# Patient Record
Sex: MALE | Race: WHITE | NOT HISPANIC OR LATINO | Employment: UNEMPLOYED | URBAN - NONMETROPOLITAN AREA
[De-identification: names, ages, dates, MRNs, and addresses within clinical notes are randomized per-mention and may not be internally consistent; named-entity substitution may affect disease eponyms.]

---

## 2018-06-19 DIAGNOSIS — M25.561 PAIN IN BOTH KNEES, UNSPECIFIED CHRONICITY: Primary | ICD-10-CM

## 2018-06-19 DIAGNOSIS — M25.562 PAIN IN BOTH KNEES, UNSPECIFIED CHRONICITY: Primary | ICD-10-CM

## 2018-06-20 ENCOUNTER — OFFICE VISIT (OUTPATIENT)
Dept: ORTHOPEDIC SURGERY | Facility: CLINIC | Age: 53
End: 2018-06-20

## 2018-06-20 ENCOUNTER — HOSPITAL ENCOUNTER (OUTPATIENT)
Dept: GENERAL RADIOLOGY | Facility: HOSPITAL | Age: 53
Discharge: HOME | End: 2018-06-20
Attending: ORTHOPAEDIC SURGERY | Admitting: ORTHOPAEDIC SURGERY

## 2018-06-20 VITALS
HEIGHT: 69 IN | SYSTOLIC BLOOD PRESSURE: 140 MMHG | WEIGHT: 211 LBS | BODY MASS INDEX: 31.25 KG/M2 | HEART RATE: 88 BPM | DIASTOLIC BLOOD PRESSURE: 78 MMHG

## 2018-06-20 DIAGNOSIS — M25.561 PAIN IN BOTH KNEES, UNSPECIFIED CHRONICITY: ICD-10-CM

## 2018-06-20 DIAGNOSIS — M17.0 PRIMARY OSTEOARTHRITIS OF BOTH KNEES: Primary | ICD-10-CM

## 2018-06-20 DIAGNOSIS — M25.562 PAIN IN BOTH KNEES, UNSPECIFIED CHRONICITY: ICD-10-CM

## 2018-06-20 PROBLEM — I10 HYPERTENSION: Status: ACTIVE | Noted: 2018-06-20

## 2018-06-20 PROBLEM — E78.00 HIGH CHOLESTEROL: Status: ACTIVE | Noted: 2018-06-20

## 2018-06-20 PROCEDURE — 73564 X-RAY EXAM KNEE 4 OR MORE: CPT

## 2018-06-20 PROCEDURE — 99203 OFFICE O/P NEW LOW 30 MIN: CPT | Performed by: ORTHOPAEDIC SURGERY

## 2018-06-20 PROCEDURE — 73564 X-RAY EXAM KNEE 4 OR MORE: CPT | Performed by: RADIOLOGY

## 2018-06-20 RX ORDER — ASPIRIN 81 MG/1
81 TABLET, CHEWABLE ORAL DAILY
COMMUNITY
Start: 2018-05-23

## 2018-06-20 RX ORDER — ATENOLOL 50 MG/1
50 TABLET ORAL DAILY
COMMUNITY
Start: 2018-06-01

## 2018-06-20 RX ORDER — CELECOXIB 200 MG/1
200 CAPSULE ORAL DAILY
Qty: 90 CAPSULE | Refills: 3 | Status: SHIPPED | OUTPATIENT
Start: 2018-06-20 | End: 2020-08-26

## 2018-06-20 RX ORDER — LOSARTAN POTASSIUM 50 MG/1
50 TABLET ORAL DAILY
COMMUNITY
Start: 2018-06-01

## 2018-06-20 RX ORDER — CETIRIZINE HYDROCHLORIDE 10 MG/1
10 TABLET ORAL DAILY
COMMUNITY
Start: 2018-06-01 | End: 2022-01-01 | Stop reason: SDUPTHER

## 2018-06-20 RX ORDER — LOVASTATIN 40 MG/1
40 TABLET ORAL EVERY EVENING
COMMUNITY
Start: 2018-05-23

## 2018-06-20 NOTE — PROGRESS NOTES
New Patient Visit      Patient: Kennedy Reardon  YOB: 1965  Date of Encounter: 06/20/2018      HPI:   Kennedy Reardon, 53 y.o. male, referred by Feliberto Moore MD for bilateral knee pain, right much worse that the left.  He has had no trauma.  He reports symptoms for about 5 years.  He experiences intermittent pain and swelling but reports progression of his symptoms over the past few months.  He has had no recent or remote injury.  He does do manual labor.  Symptoms are much worse with ascending and descending stairs.  He has not experienced giving way or locking.  Thus far he has not been treated with NSAIDs nor has he been provided intra-articular injections.     Active Problem List:  Patient Active Problem List   Diagnosis   • High cholesterol   • Hypertension       Past Medical History:  Past Medical History:   Diagnosis Date   • Medical history reviewed with no changes        Past Surgical History:  History reviewed. No pertinent surgical history.    Family History:  Family History   Problem Relation Age of Onset   • Osteoarthritis Mother    • Rheum arthritis Mother    • Hypertension Mother    • Cancer Father    • Osteoporosis Sister    • Osteoarthritis Maternal Grandfather    • Rheum arthritis Maternal Grandfather    • Heart disease Paternal Grandmother    • Cancer Paternal Grandfather    • Heart disease Paternal Grandfather    • Diabetes Paternal Grandfather        Social History:  Social History     Social History   • Marital status:      Spouse name: N/A   • Number of children: N/A   • Years of education: N/A     Occupational History   • Not on file.     Social History Main Topics   • Smoking status: Current Every Day Smoker   • Smokeless tobacco: Never Used   • Alcohol use No   • Drug use: Unknown   • Sexual activity: Not on file     Other Topics Concern   • Not on file     Social History Narrative   • No narrative on file     Patient's Body mass index is 31.16 kg/m². BMI is above  "normal parameters. Recommendations include: educational material.      Medications:  Current Outpatient Prescriptions   Medication Sig Dispense Refill   • aspirin 81 MG chewable tablet      • atenolol (TENORMIN) 50 MG tablet      • celecoxib (CeleBREX) 200 MG capsule Take 1 capsule by mouth Daily. 90 capsule 3   • cetirizine (zyrTEC) 10 MG tablet      • losartan (COZAAR) 50 MG tablet      • lovastatin (MEVACOR) 20 MG tablet        No current facility-administered medications for this visit.        Allergies:  No Known Allergies    Review of Systems:   Review of Systems   Constitutional: Negative.    HENT: Negative.    Eyes: Positive for discharge, redness and itching.   Respiratory: Negative.    Cardiovascular: Positive for leg swelling.   Gastrointestinal: Negative.    Endocrine: Negative.    Genitourinary: Negative.    Musculoskeletal: Positive for arthralgias, joint swelling and myalgias.   Skin: Negative.    Allergic/Immunologic: Negative.    Neurological: Negative.    Hematological: Negative.    Psychiatric/Behavioral: Negative.        Physical Exam:   Physical Exam  GENERAL: 53 y.o. male, alert and oriented X 3 in no acute distress.   Visit Vitals  /78   Pulse 88   Ht 175.3 cm (69\")   Wt 95.7 kg (211 lb)   BMI 31.16 kg/m²     Musculoskeletal: Bilateral knee examinationReveals moderate tenderness medial joint line right knee with minimal effusion no gross instability no gross deformity full extension flexion to 130° Lockman and drawer are negative neurovascular examination grossly intact.  Left knee examination shows mild medial joint line tenderness no significant effusion no instability with varus valgus stressing Lachman or drawer neurovascular grossly intact    Radiology/Labs:     Bilateral knee x-rays today show mild osteoarthritis bilateral knees with the proximally 30% loss of joint space medial compartment minimal on the left.    Assessment & Plan:   53 y.o. male with early degenerative arthritis " bilateral knees right significantly worse than the left as there is evidence of joint space narrowing on the right medial compartment.  Today we discussed his options.  I do think injections are a reasonable option in the future but initially he is placed on Celebrex 200 mg daily.  He is encouraged to return in about 6 weeks' time we will reevaluate and depending on his symptoms.  He may eventually be a candidate for viscous supplementation but would prefer initially to treat with NSAIDs.       ICD-10-CM ICD-9-CM   1. Primary osteoarthritis of both knees M17.0 715.16   2. Pain in both knees, unspecified chronicity M25.561 719.46    M25.562          Cc:   Feliberto Moore MD          Scribed for Pete Celeste MD by Laura Celeste RN.2:49 PM 06/20/2018

## 2020-08-26 ENCOUNTER — APPOINTMENT (OUTPATIENT)
Dept: CT IMAGING | Facility: HOSPITAL | Age: 55
End: 2020-08-26

## 2020-08-26 ENCOUNTER — PREP FOR SURGERY (OUTPATIENT)
Dept: OTHER | Facility: HOSPITAL | Age: 55
End: 2020-08-26

## 2020-08-26 ENCOUNTER — APPOINTMENT (OUTPATIENT)
Dept: GENERAL RADIOLOGY | Facility: HOSPITAL | Age: 55
End: 2020-08-26

## 2020-08-26 ENCOUNTER — APPOINTMENT (OUTPATIENT)
Dept: MRI IMAGING | Facility: HOSPITAL | Age: 55
End: 2020-08-26

## 2020-08-26 ENCOUNTER — HOSPITAL ENCOUNTER (INPATIENT)
Facility: HOSPITAL | Age: 55
LOS: 5 days | Discharge: HOME | End: 2020-08-31
Attending: EMERGENCY MEDICINE | Admitting: NEUROLOGICAL SURGERY

## 2020-08-26 DIAGNOSIS — R56.9 NEW ONSET SEIZURE (HCC): ICD-10-CM

## 2020-08-26 DIAGNOSIS — D72.829 LEUKOCYTOSIS, UNSPECIFIED TYPE: ICD-10-CM

## 2020-08-26 DIAGNOSIS — G93.89 BRAIN MASS: Primary | ICD-10-CM

## 2020-08-26 DIAGNOSIS — C71.9 GLIOMA (HCC): ICD-10-CM

## 2020-08-26 DIAGNOSIS — R13.10 DYSPHAGIA, UNSPECIFIED TYPE: ICD-10-CM

## 2020-08-26 LAB
ALBUMIN SERPL-MCNC: 4.4 G/DL (ref 3.5–5.2)
ALBUMIN/GLOB SERPL: 1.5 G/DL
ALP SERPL-CCNC: 82 U/L (ref 39–117)
ALT SERPL W P-5'-P-CCNC: 26 U/L (ref 1–41)
ANION GAP SERPL CALCULATED.3IONS-SCNC: 8 MMOL/L (ref 5–15)
AST SERPL-CCNC: 29 U/L (ref 1–40)
BASOPHILS # BLD AUTO: 0.04 10*3/MM3 (ref 0–0.2)
BASOPHILS NFR BLD AUTO: 0.3 % (ref 0–1.5)
BILIRUB SERPL-MCNC: 0.4 MG/DL (ref 0–1.2)
BUN SERPL-MCNC: 7 MG/DL (ref 6–20)
BUN/CREAT SERPL: 8 (ref 7–25)
CALCIUM SPEC-SCNC: 9.1 MG/DL (ref 8.6–10.5)
CHLORIDE SERPL-SCNC: 101 MMOL/L (ref 98–107)
CO2 SERPL-SCNC: 27 MMOL/L (ref 22–29)
CREAT SERPL-MCNC: 0.88 MG/DL (ref 0.76–1.27)
DEPRECATED RDW RBC AUTO: 42.2 FL (ref 37–54)
EOSINOPHIL # BLD AUTO: 0.03 10*3/MM3 (ref 0–0.4)
EOSINOPHIL NFR BLD AUTO: 0.2 % (ref 0.3–6.2)
ERYTHROCYTE [DISTWIDTH] IN BLOOD BY AUTOMATED COUNT: 12.1 % (ref 12.3–15.4)
GFR SERPL CREATININE-BSD FRML MDRD: 90 ML/MIN/1.73
GLOBULIN UR ELPH-MCNC: 2.9 GM/DL
GLUCOSE SERPL-MCNC: 117 MG/DL (ref 65–99)
HCT VFR BLD AUTO: 41.7 % (ref 37.5–51)
HGB BLD-MCNC: 14.3 G/DL (ref 13–17.7)
HOLD SPECIMEN: NORMAL
HOLD SPECIMEN: NORMAL
IMM GRANULOCYTES # BLD AUTO: 0.06 10*3/MM3 (ref 0–0.05)
IMM GRANULOCYTES NFR BLD AUTO: 0.5 % (ref 0–0.5)
LIPASE SERPL-CCNC: 25 U/L (ref 13–60)
LYMPHOCYTES # BLD AUTO: 1.27 10*3/MM3 (ref 0.7–3.1)
LYMPHOCYTES NFR BLD AUTO: 9.6 % (ref 19.6–45.3)
MCH RBC QN AUTO: 32 PG (ref 26.6–33)
MCHC RBC AUTO-ENTMCNC: 34.3 G/DL (ref 31.5–35.7)
MCV RBC AUTO: 93.3 FL (ref 79–97)
MONOCYTES # BLD AUTO: 0.8 10*3/MM3 (ref 0.1–0.9)
MONOCYTES NFR BLD AUTO: 6 % (ref 5–12)
NEUTROPHILS NFR BLD AUTO: 11.09 10*3/MM3 (ref 1.7–7)
NEUTROPHILS NFR BLD AUTO: 83.4 % (ref 42.7–76)
NRBC BLD AUTO-RTO: 0 /100 WBC (ref 0–0.2)
NT-PROBNP SERPL-MCNC: 358.6 PG/ML (ref 0–900)
PLATELET # BLD AUTO: 227 10*3/MM3 (ref 140–450)
PMV BLD AUTO: 10 FL (ref 6–12)
POTASSIUM SERPL-SCNC: 4.2 MMOL/L (ref 3.5–5.2)
PROT SERPL-MCNC: 7.3 G/DL (ref 6–8.5)
RBC # BLD AUTO: 4.47 10*6/MM3 (ref 4.14–5.8)
SODIUM SERPL-SCNC: 136 MMOL/L (ref 136–145)
TROPONIN T SERPL-MCNC: <0.01 NG/ML (ref 0–0.03)
WBC # BLD AUTO: 13.29 10*3/MM3 (ref 3.4–10.8)
WHOLE BLOOD HOLD SPECIMEN: NORMAL
WHOLE BLOOD HOLD SPECIMEN: NORMAL

## 2020-08-26 PROCEDURE — 84484 ASSAY OF TROPONIN QUANT: CPT | Performed by: EMERGENCY MEDICINE

## 2020-08-26 PROCEDURE — A9577 INJ MULTIHANCE: HCPCS | Performed by: EMERGENCY MEDICINE

## 2020-08-26 PROCEDURE — 71045 X-RAY EXAM CHEST 1 VIEW: CPT

## 2020-08-26 PROCEDURE — 85025 COMPLETE CBC W/AUTO DIFF WBC: CPT | Performed by: EMERGENCY MEDICINE

## 2020-08-26 PROCEDURE — 25010000002 DEXAMETHASONE PER 1 MG: Performed by: EMERGENCY MEDICINE

## 2020-08-26 PROCEDURE — 25010000002 DEXAMETHASONE PER 1 MG: Performed by: NEUROLOGICAL SURGERY

## 2020-08-26 PROCEDURE — 71250 CT THORAX DX C-: CPT

## 2020-08-26 PROCEDURE — 93005 ELECTROCARDIOGRAM TRACING: CPT | Performed by: EMERGENCY MEDICINE

## 2020-08-26 PROCEDURE — 25010000003 LEVETIRACETAM IN NACL 0.54% 1500 MG/100ML SOLUTION: Performed by: EMERGENCY MEDICINE

## 2020-08-26 PROCEDURE — 70450 CT HEAD/BRAIN W/O DYE: CPT

## 2020-08-26 PROCEDURE — 80053 COMPREHEN METABOLIC PANEL: CPT | Performed by: EMERGENCY MEDICINE

## 2020-08-26 PROCEDURE — 70553 MRI BRAIN STEM W/O & W/DYE: CPT

## 2020-08-26 PROCEDURE — 83690 ASSAY OF LIPASE: CPT | Performed by: EMERGENCY MEDICINE

## 2020-08-26 PROCEDURE — 99285 EMERGENCY DEPT VISIT HI MDM: CPT

## 2020-08-26 PROCEDURE — 83880 ASSAY OF NATRIURETIC PEPTIDE: CPT | Performed by: EMERGENCY MEDICINE

## 2020-08-26 PROCEDURE — 0 GADOBENATE DIMEGLUMINE 529 MG/ML SOLUTION: Performed by: EMERGENCY MEDICINE

## 2020-08-26 PROCEDURE — 99223 1ST HOSP IP/OBS HIGH 75: CPT | Performed by: HOSPITALIST

## 2020-08-26 RX ORDER — LEVETIRACETAM 15 MG/ML
1500 INJECTION INTRAVASCULAR ONCE
Status: COMPLETED | OUTPATIENT
Start: 2020-08-26 | End: 2020-08-26

## 2020-08-26 RX ORDER — FOLIC ACID 1 MG/1
1 TABLET ORAL DAILY
Status: DISCONTINUED | OUTPATIENT
Start: 2020-08-27 | End: 2020-08-28

## 2020-08-26 RX ORDER — ASPIRIN 81 MG/1
324 TABLET, CHEWABLE ORAL ONCE
Status: COMPLETED | OUTPATIENT
Start: 2020-08-26 | End: 2020-08-26

## 2020-08-26 RX ORDER — SODIUM CHLORIDE 0.9 % (FLUSH) 0.9 %
10 SYRINGE (ML) INJECTION AS NEEDED
Status: DISCONTINUED | OUTPATIENT
Start: 2020-08-26 | End: 2020-08-28

## 2020-08-26 RX ORDER — LORAZEPAM 1 MG/1
1 TABLET ORAL
Status: DISCONTINUED | OUTPATIENT
Start: 2020-08-26 | End: 2020-08-28

## 2020-08-26 RX ORDER — SODIUM CHLORIDE, SODIUM LACTATE, POTASSIUM CHLORIDE, CALCIUM CHLORIDE 600; 310; 30; 20 MG/100ML; MG/100ML; MG/100ML; MG/100ML
100 INJECTION, SOLUTION INTRAVENOUS CONTINUOUS
Status: CANCELLED | OUTPATIENT
Start: 2020-08-26

## 2020-08-26 RX ORDER — DEXAMETHASONE SODIUM PHOSPHATE 4 MG/ML
4 INJECTION, SOLUTION INTRA-ARTICULAR; INTRALESIONAL; INTRAMUSCULAR; INTRAVENOUS; SOFT TISSUE EVERY 8 HOURS
Status: DISCONTINUED | OUTPATIENT
Start: 2020-08-26 | End: 2020-08-26

## 2020-08-26 RX ORDER — LORAZEPAM 2 MG/ML
2 INJECTION INTRAMUSCULAR
Status: DISCONTINUED | OUTPATIENT
Start: 2020-08-26 | End: 2020-08-28

## 2020-08-26 RX ORDER — THIAMINE MONONITRATE (VIT B1) 100 MG
100 TABLET ORAL DAILY
Status: DISCONTINUED | OUTPATIENT
Start: 2020-08-27 | End: 2020-08-28

## 2020-08-26 RX ORDER — LEVETIRACETAM 500 MG/1
500 TABLET ORAL EVERY 12 HOURS SCHEDULED
Status: DISCONTINUED | OUTPATIENT
Start: 2020-08-26 | End: 2020-08-31 | Stop reason: HOSPADM

## 2020-08-26 RX ORDER — CEFAZOLIN SODIUM 2 G/100ML
2 INJECTION, SOLUTION INTRAVENOUS ONCE
Status: CANCELLED | OUTPATIENT
Start: 2020-08-26 | End: 2020-08-26

## 2020-08-26 RX ORDER — SODIUM CHLORIDE 0.9 % (FLUSH) 0.9 %
3 SYRINGE (ML) INJECTION EVERY 12 HOURS SCHEDULED
Status: CANCELLED | OUTPATIENT
Start: 2020-08-26

## 2020-08-26 RX ORDER — DIPHENOXYLATE HYDROCHLORIDE AND ATROPINE SULFATE 2.5; .025 MG/1; MG/1
1 TABLET ORAL DAILY
Status: DISCONTINUED | OUTPATIENT
Start: 2020-08-27 | End: 2020-08-28

## 2020-08-26 RX ORDER — LORAZEPAM 1 MG/1
2 TABLET ORAL
Status: DISCONTINUED | OUTPATIENT
Start: 2020-08-26 | End: 2020-08-28

## 2020-08-26 RX ORDER — DEXAMETHASONE IN 0.9 % SOD CHL 10 MG/50ML
10 INTRAVENOUS SOLUTION, PIGGYBACK (ML) INTRAVENOUS
Status: CANCELLED | OUTPATIENT
Start: 2020-08-26

## 2020-08-26 RX ORDER — LORAZEPAM 2 MG/ML
1 INJECTION INTRAMUSCULAR
Status: DISCONTINUED | OUTPATIENT
Start: 2020-08-26 | End: 2020-08-28

## 2020-08-26 RX ORDER — DEXAMETHASONE SODIUM PHOSPHATE 4 MG/ML
4 INJECTION, SOLUTION INTRA-ARTICULAR; INTRALESIONAL; INTRAMUSCULAR; INTRAVENOUS; SOFT TISSUE 4 TIMES DAILY
Status: DISCONTINUED | OUTPATIENT
Start: 2020-08-26 | End: 2020-08-27

## 2020-08-26 RX ORDER — DEXAMETHASONE SODIUM PHOSPHATE 4 MG/ML
8 INJECTION, SOLUTION INTRA-ARTICULAR; INTRALESIONAL; INTRAMUSCULAR; INTRAVENOUS; SOFT TISSUE ONCE
Status: COMPLETED | OUTPATIENT
Start: 2020-08-26 | End: 2020-08-26

## 2020-08-26 RX ORDER — SODIUM CHLORIDE 0.9 % (FLUSH) 0.9 %
10 SYRINGE (ML) INJECTION AS NEEDED
Status: CANCELLED | OUTPATIENT
Start: 2020-08-26

## 2020-08-26 RX ADMIN — LEVETIRACETAM 500 MG: 500 TABLET ORAL at 21:40

## 2020-08-26 RX ADMIN — ASPIRIN 324 MG: 81 TABLET, CHEWABLE ORAL at 13:16

## 2020-08-26 RX ADMIN — DEXAMETHASONE SODIUM PHOSPHATE 8 MG: 4 INJECTION, SOLUTION INTRA-ARTICULAR; INTRALESIONAL; INTRAMUSCULAR; INTRAVENOUS; SOFT TISSUE at 13:13

## 2020-08-26 RX ADMIN — SODIUM CHLORIDE, PRESERVATIVE FREE 10 ML: 5 INJECTION INTRAVENOUS at 21:40

## 2020-08-26 RX ADMIN — DEXAMETHASONE SODIUM PHOSPHATE 4 MG: 4 INJECTION, SOLUTION INTRA-ARTICULAR; INTRALESIONAL; INTRAMUSCULAR; INTRAVENOUS; SOFT TISSUE at 21:40

## 2020-08-26 RX ADMIN — GADOBENATE DIMEGLUMINE 20 ML: 529 INJECTION, SOLUTION INTRAVENOUS at 15:33

## 2020-08-26 RX ADMIN — LEVETIRACETAM INJECTION 1500 MG: 15 INJECTION INTRAVENOUS at 14:12

## 2020-08-27 ENCOUNTER — APPOINTMENT (OUTPATIENT)
Dept: MRI IMAGING | Facility: HOSPITAL | Age: 55
End: 2020-08-27

## 2020-08-27 ENCOUNTER — ANESTHESIA EVENT (OUTPATIENT)
Dept: PERIOP | Facility: HOSPITAL | Age: 55
End: 2020-08-27

## 2020-08-27 LAB
ANION GAP SERPL CALCULATED.3IONS-SCNC: 8 MMOL/L (ref 5–15)
B PARAPERT DNA SPEC QL NAA+PROBE: NOT DETECTED
B PERT DNA SPEC QL NAA+PROBE: NOT DETECTED
BASOPHILS # BLD AUTO: 0.01 10*3/MM3 (ref 0–0.2)
BASOPHILS NFR BLD AUTO: 0.1 % (ref 0–1.5)
BUN SERPL-MCNC: 8 MG/DL (ref 6–20)
BUN/CREAT SERPL: 10.3 (ref 7–25)
C PNEUM DNA NPH QL NAA+NON-PROBE: NOT DETECTED
CALCIUM SPEC-SCNC: 9 MG/DL (ref 8.6–10.5)
CHLORIDE SERPL-SCNC: 103 MMOL/L (ref 98–107)
CO2 SERPL-SCNC: 27 MMOL/L (ref 22–29)
CREAT SERPL-MCNC: 0.78 MG/DL (ref 0.76–1.27)
DEPRECATED RDW RBC AUTO: 43.6 FL (ref 37–54)
EOSINOPHIL # BLD AUTO: 0 10*3/MM3 (ref 0–0.4)
EOSINOPHIL NFR BLD AUTO: 0 % (ref 0.3–6.2)
ERYTHROCYTE [DISTWIDTH] IN BLOOD BY AUTOMATED COUNT: 12.4 % (ref 12.3–15.4)
FLUAV H1 2009 PAND RNA NPH QL NAA+PROBE: NOT DETECTED
FLUAV H1 HA GENE NPH QL NAA+PROBE: NOT DETECTED
FLUAV H3 RNA NPH QL NAA+PROBE: NOT DETECTED
FLUAV SUBTYP SPEC NAA+PROBE: NOT DETECTED
FLUBV RNA ISLT QL NAA+PROBE: NOT DETECTED
GFR SERPL CREATININE-BSD FRML MDRD: 104 ML/MIN/1.73
GLUCOSE SERPL-MCNC: 132 MG/DL (ref 65–99)
HADV DNA SPEC NAA+PROBE: NOT DETECTED
HCOV 229E RNA SPEC QL NAA+PROBE: NOT DETECTED
HCOV HKU1 RNA SPEC QL NAA+PROBE: NOT DETECTED
HCOV NL63 RNA SPEC QL NAA+PROBE: NOT DETECTED
HCOV OC43 RNA SPEC QL NAA+PROBE: NOT DETECTED
HCT VFR BLD AUTO: 40.9 % (ref 37.5–51)
HGB BLD-MCNC: 13.6 G/DL (ref 13–17.7)
HMPV RNA NPH QL NAA+NON-PROBE: NOT DETECTED
HPIV1 RNA SPEC QL NAA+PROBE: NOT DETECTED
HPIV2 RNA SPEC QL NAA+PROBE: NOT DETECTED
HPIV3 RNA NPH QL NAA+PROBE: NOT DETECTED
HPIV4 P GENE NPH QL NAA+PROBE: NOT DETECTED
IMM GRANULOCYTES # BLD AUTO: 0.07 10*3/MM3 (ref 0–0.05)
IMM GRANULOCYTES NFR BLD AUTO: 0.6 % (ref 0–0.5)
LYMPHOCYTES # BLD AUTO: 0.96 10*3/MM3 (ref 0.7–3.1)
LYMPHOCYTES NFR BLD AUTO: 7.6 % (ref 19.6–45.3)
M PNEUMO IGG SER IA-ACNC: NOT DETECTED
MCH RBC QN AUTO: 31.6 PG (ref 26.6–33)
MCHC RBC AUTO-ENTMCNC: 33.3 G/DL (ref 31.5–35.7)
MCV RBC AUTO: 94.9 FL (ref 79–97)
MONOCYTES # BLD AUTO: 0.48 10*3/MM3 (ref 0.1–0.9)
MONOCYTES NFR BLD AUTO: 3.8 % (ref 5–12)
NEUTROPHILS NFR BLD AUTO: 11.05 10*3/MM3 (ref 1.7–7)
NEUTROPHILS NFR BLD AUTO: 87.9 % (ref 42.7–76)
NRBC BLD AUTO-RTO: 0 /100 WBC (ref 0–0.2)
PLATELET # BLD AUTO: 199 10*3/MM3 (ref 140–450)
PMV BLD AUTO: 10.4 FL (ref 6–12)
POTASSIUM SERPL-SCNC: 4.6 MMOL/L (ref 3.5–5.2)
RBC # BLD AUTO: 4.31 10*6/MM3 (ref 4.14–5.8)
RHINOVIRUS RNA SPEC NAA+PROBE: NOT DETECTED
RSV RNA NPH QL NAA+NON-PROBE: NOT DETECTED
SARS-COV-2 RNA NPH QL NAA+NON-PROBE: NOT DETECTED
SODIUM SERPL-SCNC: 138 MMOL/L (ref 136–145)
WBC # BLD AUTO: 12.57 10*3/MM3 (ref 3.4–10.8)

## 2020-08-27 PROCEDURE — 80048 BASIC METABOLIC PNL TOTAL CA: CPT | Performed by: HOSPITALIST

## 2020-08-27 PROCEDURE — 92610 EVALUATE SWALLOWING FUNCTION: CPT

## 2020-08-27 PROCEDURE — 85025 COMPLETE CBC W/AUTO DIFF WBC: CPT | Performed by: HOSPITALIST

## 2020-08-27 PROCEDURE — 0202U NFCT DS 22 TRGT SARS-COV-2: CPT | Performed by: INTERNAL MEDICINE

## 2020-08-27 PROCEDURE — 99232 SBSQ HOSP IP/OBS MODERATE 35: CPT | Performed by: INTERNAL MEDICINE

## 2020-08-27 PROCEDURE — 25010000002 DEXAMETHASONE PER 1 MG: Performed by: NEUROLOGICAL SURGERY

## 2020-08-27 PROCEDURE — 70552 MRI BRAIN STEM W/DYE: CPT

## 2020-08-27 PROCEDURE — A9577 INJ MULTIHANCE: HCPCS | Performed by: INTERNAL MEDICINE

## 2020-08-27 PROCEDURE — 0 GADOBENATE DIMEGLUMINE 529 MG/ML SOLUTION: Performed by: INTERNAL MEDICINE

## 2020-08-27 RX ORDER — CETIRIZINE HYDROCHLORIDE 10 MG/1
10 TABLET ORAL DAILY
Status: DISCONTINUED | OUTPATIENT
Start: 2020-08-27 | End: 2020-08-31 | Stop reason: HOSPADM

## 2020-08-27 RX ORDER — SODIUM CHLORIDE 0.9 % (FLUSH) 0.9 %
10 SYRINGE (ML) INJECTION AS NEEDED
Status: DISCONTINUED | OUTPATIENT
Start: 2020-08-27 | End: 2020-08-28

## 2020-08-27 RX ORDER — SODIUM CHLORIDE 0.9 % (FLUSH) 0.9 %
10 SYRINGE (ML) INJECTION EVERY 12 HOURS SCHEDULED
Status: DISCONTINUED | OUTPATIENT
Start: 2020-08-27 | End: 2020-08-28

## 2020-08-27 RX ORDER — ATORVASTATIN CALCIUM 10 MG/1
10 TABLET, FILM COATED ORAL NIGHTLY
Status: DISCONTINUED | OUTPATIENT
Start: 2020-08-27 | End: 2020-08-31 | Stop reason: HOSPADM

## 2020-08-27 RX ORDER — SODIUM CHLORIDE, SODIUM LACTATE, POTASSIUM CHLORIDE, CALCIUM CHLORIDE 600; 310; 30; 20 MG/100ML; MG/100ML; MG/100ML; MG/100ML
100 INJECTION, SOLUTION INTRAVENOUS CONTINUOUS
Status: DISCONTINUED | OUTPATIENT
Start: 2020-08-27 | End: 2020-08-28

## 2020-08-27 RX ORDER — ATENOLOL 50 MG/1
50 TABLET ORAL
Status: DISCONTINUED | OUTPATIENT
Start: 2020-08-27 | End: 2020-08-31 | Stop reason: HOSPADM

## 2020-08-27 RX ORDER — ONDANSETRON 2 MG/ML
4 INJECTION INTRAMUSCULAR; INTRAVENOUS EVERY 6 HOURS PRN
Status: DISCONTINUED | OUTPATIENT
Start: 2020-08-27 | End: 2020-08-28

## 2020-08-27 RX ORDER — DEXAMETHASONE SODIUM PHOSPHATE 4 MG/ML
4 INJECTION, SOLUTION INTRA-ARTICULAR; INTRALESIONAL; INTRAMUSCULAR; INTRAVENOUS; SOFT TISSUE EVERY 6 HOURS SCHEDULED
Status: DISCONTINUED | OUTPATIENT
Start: 2020-08-27 | End: 2020-08-28 | Stop reason: SDUPTHER

## 2020-08-27 RX ORDER — ONDANSETRON 4 MG/1
4 TABLET, FILM COATED ORAL EVERY 6 HOURS PRN
Status: DISCONTINUED | OUTPATIENT
Start: 2020-08-27 | End: 2020-08-28

## 2020-08-27 RX ORDER — DEXAMETHASONE SODIUM PHOSPHATE 4 MG/ML
4 INJECTION, SOLUTION INTRA-ARTICULAR; INTRALESIONAL; INTRAMUSCULAR; INTRAVENOUS; SOFT TISSUE EVERY 8 HOURS
Status: DISCONTINUED | OUTPATIENT
Start: 2020-08-27 | End: 2020-08-27 | Stop reason: SDUPTHER

## 2020-08-27 RX ORDER — SODIUM CHLORIDE, SODIUM LACTATE, POTASSIUM CHLORIDE, CALCIUM CHLORIDE 600; 310; 30; 20 MG/100ML; MG/100ML; MG/100ML; MG/100ML
9 INJECTION, SOLUTION INTRAVENOUS CONTINUOUS
Status: CANCELLED | OUTPATIENT
Start: 2020-08-27

## 2020-08-27 RX ORDER — FAMOTIDINE 20 MG/1
20 TABLET, FILM COATED ORAL ONCE
Status: CANCELLED | OUTPATIENT
Start: 2020-08-27 | End: 2020-08-27

## 2020-08-27 RX ADMIN — MULTIVITAMIN TABLET 1 TABLET: TABLET at 09:20

## 2020-08-27 RX ADMIN — THIAMINE HCL TAB 100 MG 100 MG: 100 TAB at 09:20

## 2020-08-27 RX ADMIN — SODIUM CHLORIDE, POTASSIUM CHLORIDE, SODIUM LACTATE AND CALCIUM CHLORIDE 100 ML/HR: 600; 310; 30; 20 INJECTION, SOLUTION INTRAVENOUS at 00:40

## 2020-08-27 RX ADMIN — SODIUM CHLORIDE, POTASSIUM CHLORIDE, SODIUM LACTATE AND CALCIUM CHLORIDE 100 ML/HR: 600; 310; 30; 20 INJECTION, SOLUTION INTRAVENOUS at 09:29

## 2020-08-27 RX ADMIN — SODIUM CHLORIDE, POTASSIUM CHLORIDE, SODIUM LACTATE AND CALCIUM CHLORIDE 100 ML/HR: 600; 310; 30; 20 INJECTION, SOLUTION INTRAVENOUS at 21:25

## 2020-08-27 RX ADMIN — SODIUM CHLORIDE, PRESERVATIVE FREE 10 ML: 5 INJECTION INTRAVENOUS at 09:19

## 2020-08-27 RX ADMIN — DEXAMETHASONE SODIUM PHOSPHATE 4 MG: 4 INJECTION, SOLUTION INTRA-ARTICULAR; INTRALESIONAL; INTRAMUSCULAR; INTRAVENOUS; SOFT TISSUE at 17:46

## 2020-08-27 RX ADMIN — ATENOLOL 50 MG: 50 TABLET ORAL at 09:22

## 2020-08-27 RX ADMIN — SODIUM CHLORIDE, PRESERVATIVE FREE 10 ML: 5 INJECTION INTRAVENOUS at 00:43

## 2020-08-27 RX ADMIN — GADOBENATE DIMEGLUMINE 20 ML: 529 INJECTION, SOLUTION INTRAVENOUS at 15:45

## 2020-08-27 RX ADMIN — ATORVASTATIN CALCIUM 10 MG: 10 TABLET, FILM COATED ORAL at 00:39

## 2020-08-27 RX ADMIN — SODIUM CHLORIDE, POTASSIUM CHLORIDE, SODIUM LACTATE AND CALCIUM CHLORIDE 500 ML: 600; 310; 30; 20 INJECTION, SOLUTION INTRAVENOUS at 00:39

## 2020-08-27 RX ADMIN — LEVETIRACETAM 500 MG: 500 TABLET ORAL at 09:22

## 2020-08-27 RX ADMIN — ATORVASTATIN CALCIUM 10 MG: 10 TABLET, FILM COATED ORAL at 21:26

## 2020-08-27 RX ADMIN — DEXAMETHASONE SODIUM PHOSPHATE 4 MG: 4 INJECTION, SOLUTION INTRA-ARTICULAR; INTRALESIONAL; INTRAMUSCULAR; INTRAVENOUS; SOFT TISSUE at 11:18

## 2020-08-27 RX ADMIN — FOLIC ACID 1 MG: 1 TABLET ORAL at 09:20

## 2020-08-27 RX ADMIN — CETIRIZINE HYDROCHLORIDE 10 MG: 10 TABLET, FILM COATED ORAL at 11:19

## 2020-08-27 RX ADMIN — LEVETIRACETAM 500 MG: 500 TABLET ORAL at 21:26

## 2020-08-27 RX ADMIN — SODIUM CHLORIDE, PRESERVATIVE FREE 10 ML: 5 INJECTION INTRAVENOUS at 21:26

## 2020-08-27 RX ADMIN — DEXAMETHASONE SODIUM PHOSPHATE 4 MG: 4 INJECTION, SOLUTION INTRA-ARTICULAR; INTRALESIONAL; INTRAMUSCULAR; INTRAVENOUS; SOFT TISSUE at 04:22

## 2020-08-27 NOTE — ANESTHESIA PREPROCEDURE EVALUATION
Anesthesia Evaluation     Patient summary reviewed and Nursing notes reviewed   no history of anesthetic complications:  NPO Solid Status: > 8 hours  NPO Liquid Status: > 8 hours           Airway   Mallampati: II  TM distance: >3 FB  Neck ROM: full  No difficulty expected  Dental - normal exam     Pulmonary    (+) a smoker Current, wheezes,   Cardiovascular - normal exam    ECG reviewed    (+) hypertension, hyperlipidemia,   (-) dysrhythmias, angina, LOW      Neuro/Psych  (+) seizures,       ROS Comment: MRI:   large cystic mass identified within the left temporoparietal lobe  creating surrounding mass effect with minimal surrounding edema. There  is some mass effect on the posterior horn of the left lateral ventricle.  Minimal midline shift.  GI/Hepatic/Renal/Endo - negative ROS     Musculoskeletal (-) negative ROS    Abdominal    Substance History      OB/GYN          Other - negative ROS                     Anesthesia Plan    ASA 3     general   ([place]  preop Duoneb)  intravenous induction     Anesthetic plan, all risks, benefits, and alternatives have been provided, discussed and informed consent has been obtained with: patient.  Use of blood products discussed with patient  Consented to blood products.   Plan discussed with CRNA.

## 2020-08-28 ENCOUNTER — ANESTHESIA (OUTPATIENT)
Dept: PERIOP | Facility: HOSPITAL | Age: 55
End: 2020-08-28

## 2020-08-28 PROBLEM — E66.9 CLASS 1 OBESITY IN ADULT: Chronic | Status: ACTIVE | Noted: 2020-08-28

## 2020-08-28 PROBLEM — E78.5 DYSLIPIDEMIA: Chronic | Status: ACTIVE | Noted: 2018-06-20

## 2020-08-28 PROBLEM — Z78.9 ALCOHOL USE: Chronic | Status: ACTIVE | Noted: 2020-08-28

## 2020-08-28 PROBLEM — F17.200 CURRENT SMOKER: Chronic | Status: ACTIVE | Noted: 2020-08-28

## 2020-08-28 PROBLEM — E66.9 CLASS 1 OBESITY IN ADULT: Status: ACTIVE | Noted: 2020-08-28

## 2020-08-28 PROBLEM — E78.5 DYSLIPIDEMIA: Status: ACTIVE | Noted: 2018-06-20

## 2020-08-28 PROBLEM — I10 HYPERTENSION: Chronic | Status: ACTIVE | Noted: 2018-06-20

## 2020-08-28 PROBLEM — F17.200 CURRENT SMOKER: Status: ACTIVE | Noted: 2020-08-28

## 2020-08-28 PROBLEM — Z78.9 ALCOHOL USE: Status: ACTIVE | Noted: 2020-08-28

## 2020-08-28 LAB
ABO GROUP BLD: NORMAL
ABO GROUP BLD: NORMAL
ANION GAP SERPL CALCULATED.3IONS-SCNC: 9 MMOL/L (ref 5–15)
BASOPHILS # BLD MANUAL: 0 10*3/MM3 (ref 0–0.2)
BASOPHILS NFR BLD AUTO: 0 % (ref 0–1.5)
BLD GP AB SCN SERPL QL: NEGATIVE
BUN SERPL-MCNC: 12 MG/DL (ref 6–20)
BUN/CREAT SERPL: 15.2 (ref 7–25)
CALCIUM SPEC-SCNC: 9.1 MG/DL (ref 8.6–10.5)
CHLORIDE SERPL-SCNC: 104 MMOL/L (ref 98–107)
CO2 SERPL-SCNC: 27 MMOL/L (ref 22–29)
CREAT SERPL-MCNC: 0.79 MG/DL (ref 0.76–1.27)
DEPRECATED RDW RBC AUTO: 43.8 FL (ref 37–54)
EOSINOPHIL # BLD MANUAL: 0 10*3/MM3 (ref 0–0.4)
EOSINOPHIL NFR BLD MANUAL: 0 % (ref 0.3–6.2)
ERYTHROCYTE [DISTWIDTH] IN BLOOD BY AUTOMATED COUNT: 12.5 % (ref 12.3–15.4)
GFR SERPL CREATININE-BSD FRML MDRD: 102 ML/MIN/1.73
GLUCOSE SERPL-MCNC: 129 MG/DL (ref 65–99)
HCT VFR BLD AUTO: 40.7 % (ref 37.5–51)
HGB BLD-MCNC: 13.6 G/DL (ref 13–17.7)
INR PPP: 1.02 (ref 0.85–1.16)
LYMPHOCYTES # BLD MANUAL: 0.78 10*3/MM3 (ref 0.7–3.1)
LYMPHOCYTES NFR BLD MANUAL: 2 % (ref 5–12)
LYMPHOCYTES NFR BLD MANUAL: 5.1 % (ref 19.6–45.3)
MCH RBC QN AUTO: 32.1 PG (ref 26.6–33)
MCHC RBC AUTO-ENTMCNC: 33.4 G/DL (ref 31.5–35.7)
MCV RBC AUTO: 96 FL (ref 79–97)
MONOCYTES # BLD AUTO: 0.31 10*3/MM3 (ref 0.1–0.9)
NEUTROPHILS # BLD AUTO: 14.3 10*3/MM3 (ref 1.7–7)
NEUTROPHILS NFR BLD MANUAL: 91.9 % (ref 42.7–76)
NEUTS BAND NFR BLD MANUAL: 1 % (ref 0–5)
PLAT MORPH BLD: NORMAL
PLATELET # BLD AUTO: 195 10*3/MM3 (ref 140–450)
PMV BLD AUTO: 10.3 FL (ref 6–12)
POTASSIUM SERPL-SCNC: 4.6 MMOL/L (ref 3.5–5.2)
PROTHROMBIN TIME: 13.1 SECONDS (ref 11.5–14)
RBC # BLD AUTO: 4.24 10*6/MM3 (ref 4.14–5.8)
RBC MORPH BLD: NORMAL
RH BLD: POSITIVE
RH BLD: POSITIVE
SCAN SLIDE: NORMAL
SODIUM SERPL-SCNC: 140 MMOL/L (ref 136–145)
T&S EXPIRATION DATE: NORMAL
WBC # BLD AUTO: 15.39 10*3/MM3 (ref 3.4–10.8)
WBC MORPH BLD: NORMAL

## 2020-08-28 PROCEDURE — 86850 RBC ANTIBODY SCREEN: CPT | Performed by: NEUROLOGICAL SURGERY

## 2020-08-28 PROCEDURE — 88377 M/PHMTRC ALYS ISHQUANT/SEMIQ: CPT

## 2020-08-28 PROCEDURE — 25010000002 DEXAMETHASONE PER 1 MG: Performed by: NEUROLOGICAL SURGERY

## 2020-08-28 PROCEDURE — 00B70ZZ EXCISION OF CEREBRAL HEMISPHERE, OPEN APPROACH: ICD-10-PCS | Performed by: NEUROLOGICAL SURGERY

## 2020-08-28 PROCEDURE — 61781 SCAN PROC CRANIAL INTRA: CPT | Performed by: NEUROLOGICAL SURGERY

## 2020-08-28 PROCEDURE — 61510 CRNEC TREPH EXC BRN TUM STTL: CPT | Performed by: NEUROLOGICAL SURGERY

## 2020-08-28 PROCEDURE — 25010000002 FUROSEMIDE PER 20 MG: Performed by: NURSE ANESTHETIST, CERTIFIED REGISTERED

## 2020-08-28 PROCEDURE — 81287 MGMT GENE PRMTR MTHYLTN ALYS: CPT

## 2020-08-28 PROCEDURE — 94640 AIRWAY INHALATION TREATMENT: CPT

## 2020-08-28 PROCEDURE — 88331 PATH CONSLTJ SURG 1 BLK 1SPC: CPT | Performed by: PATHOLOGY

## 2020-08-28 PROCEDURE — C1713 ANCHOR/SCREW BN/BN,TIS/BN: HCPCS | Performed by: NEUROLOGICAL SURGERY

## 2020-08-28 PROCEDURE — 86901 BLOOD TYPING SEROLOGIC RH(D): CPT

## 2020-08-28 PROCEDURE — 85025 COMPLETE CBC W/AUTO DIFF WBC: CPT | Performed by: INTERNAL MEDICINE

## 2020-08-28 PROCEDURE — 25010000003 POTASSIUM CHLORIDE PER 2 MEQ: Performed by: NEUROLOGICAL SURGERY

## 2020-08-28 PROCEDURE — 99233 SBSQ HOSP IP/OBS HIGH 50: CPT | Performed by: INTERNAL MEDICINE

## 2020-08-28 PROCEDURE — 25010000002 FENTANYL CITRATE (PF) 100 MCG/2ML SOLUTION: Performed by: NURSE ANESTHETIST, CERTIFIED REGISTERED

## 2020-08-28 PROCEDURE — 86900 BLOOD TYPING SEROLOGIC ABO: CPT

## 2020-08-28 PROCEDURE — 80048 BASIC METABOLIC PNL TOTAL CA: CPT | Performed by: INTERNAL MEDICINE

## 2020-08-28 PROCEDURE — 86901 BLOOD TYPING SEROLOGIC RH(D): CPT | Performed by: NEUROLOGICAL SURGERY

## 2020-08-28 PROCEDURE — 25010000002 DEXAMETHASONE SOD PHOS-NACL 10-0.9 MG/50ML-% SOLUTION: Performed by: NEUROLOGICAL SURGERY

## 2020-08-28 PROCEDURE — 86900 BLOOD TYPING SEROLOGIC ABO: CPT | Performed by: NEUROLOGICAL SURGERY

## 2020-08-28 PROCEDURE — 81479 UNLISTED MOLECULAR PATHOLOGY: CPT

## 2020-08-28 PROCEDURE — 85610 PROTHROMBIN TIME: CPT | Performed by: INTERNAL MEDICINE

## 2020-08-28 PROCEDURE — 25010000003 CEFAZOLIN IN DEXTROSE 2-4 GM/100ML-% SOLUTION: Performed by: NEUROLOGICAL SURGERY

## 2020-08-28 PROCEDURE — 25010000002 HYDROMORPHONE PER 4 MG: Performed by: NURSE ANESTHETIST, CERTIFIED REGISTERED

## 2020-08-28 PROCEDURE — 88307 TISSUE EXAM BY PATHOLOGIST: CPT | Performed by: NEUROLOGICAL SURGERY

## 2020-08-28 PROCEDURE — 25010000002 PROPOFOL 10 MG/ML EMULSION: Performed by: NURSE ANESTHETIST, CERTIFIED REGISTERED

## 2020-08-28 PROCEDURE — 85007 BL SMEAR W/DIFF WBC COUNT: CPT | Performed by: INTERNAL MEDICINE

## 2020-08-28 PROCEDURE — 81345 TERT GENE TARGETED SEQ ALYS: CPT

## 2020-08-28 DEVICE — SCRW MATRIXNEURO SD TI 4MM: Type: IMPLANTABLE DEVICE | Site: CRANIAL | Status: FUNCTIONAL

## 2020-08-28 DEVICE — FLOSEAL HEMOSTATIC MATRIX, 10 ML
Type: IMPLANTABLE DEVICE | Site: BRAIN | Status: FUNCTIONAL
Brand: FLOSEAL

## 2020-08-28 DEVICE — DURAGEN® PLUS DURAL REGENERATION MATRIX, 3 IN X 3 IN (7.5 CM X 7.5 CM)
Type: IMPLANTABLE DEVICE | Site: BRAIN | Status: FUNCTIONAL
Brand: DURAGEN® PLUS

## 2020-08-28 DEVICE — WAX BONE HEMO AESCULAP 2.5GM: Type: IMPLANTABLE DEVICE | Site: CRANIAL | Status: FUNCTIONAL

## 2020-08-28 DEVICE — PLT CVR BURHL MATRIXNEURO TI 17MM: Type: IMPLANTABLE DEVICE | Site: CRANIAL | Status: FUNCTIONAL

## 2020-08-28 RX ORDER — MEPERIDINE HYDROCHLORIDE 25 MG/ML
12.5 INJECTION INTRAMUSCULAR; INTRAVENOUS; SUBCUTANEOUS
Status: DISCONTINUED | OUTPATIENT
Start: 2020-08-28 | End: 2020-08-28 | Stop reason: HOSPADM

## 2020-08-28 RX ORDER — ROCURONIUM BROMIDE 10 MG/ML
INJECTION, SOLUTION INTRAVENOUS AS NEEDED
Status: DISCONTINUED | OUTPATIENT
Start: 2020-08-28 | End: 2020-08-28 | Stop reason: SURG

## 2020-08-28 RX ORDER — SODIUM CHLORIDE 0.9 % (FLUSH) 0.9 %
3-10 SYRINGE (ML) INJECTION AS NEEDED
Status: DISCONTINUED | OUTPATIENT
Start: 2020-08-28 | End: 2020-08-28 | Stop reason: HOSPADM

## 2020-08-28 RX ORDER — REMIFENTANIL HYDROCHLORIDE 1 MG/ML
INJECTION, POWDER, LYOPHILIZED, FOR SOLUTION INTRAVENOUS CONTINUOUS PRN
Status: DISCONTINUED | OUTPATIENT
Start: 2020-08-28 | End: 2020-08-28 | Stop reason: SURG

## 2020-08-28 RX ORDER — SODIUM CHLORIDE AND POTASSIUM CHLORIDE 150; 450 MG/100ML; MG/100ML
100 INJECTION, SOLUTION INTRAVENOUS CONTINUOUS
Status: DISCONTINUED | OUTPATIENT
Start: 2020-08-28 | End: 2020-08-28

## 2020-08-28 RX ORDER — SODIUM CHLORIDE 0.9 % (FLUSH) 0.9 %
3 SYRINGE (ML) INJECTION EVERY 12 HOURS SCHEDULED
Status: DISCONTINUED | OUTPATIENT
Start: 2020-08-28 | End: 2020-08-28 | Stop reason: HOSPADM

## 2020-08-28 RX ORDER — ONDANSETRON 2 MG/ML
4 INJECTION INTRAMUSCULAR; INTRAVENOUS EVERY 6 HOURS PRN
Status: DISCONTINUED | OUTPATIENT
Start: 2020-08-28 | End: 2020-08-31 | Stop reason: HOSPADM

## 2020-08-28 RX ORDER — FAMOTIDINE 20 MG/1
20 TABLET, FILM COATED ORAL
Status: DISCONTINUED | OUTPATIENT
Start: 2020-08-28 | End: 2020-08-28

## 2020-08-28 RX ORDER — MAGNESIUM HYDROXIDE 1200 MG/15ML
LIQUID ORAL AS NEEDED
Status: DISCONTINUED | OUTPATIENT
Start: 2020-08-28 | End: 2020-08-28 | Stop reason: HOSPADM

## 2020-08-28 RX ORDER — LABETALOL HYDROCHLORIDE 5 MG/ML
5 INJECTION, SOLUTION INTRAVENOUS
Status: DISCONTINUED | OUTPATIENT
Start: 2020-08-28 | End: 2020-08-28 | Stop reason: HOSPADM

## 2020-08-28 RX ORDER — SODIUM CHLORIDE 9 MG/ML
INJECTION, SOLUTION INTRAVENOUS AS NEEDED
Status: DISCONTINUED | OUTPATIENT
Start: 2020-08-28 | End: 2020-08-28 | Stop reason: HOSPADM

## 2020-08-28 RX ORDER — HYDROMORPHONE HYDROCHLORIDE 1 MG/ML
0.5 INJECTION, SOLUTION INTRAMUSCULAR; INTRAVENOUS; SUBCUTANEOUS
Status: DISCONTINUED | OUTPATIENT
Start: 2020-08-28 | End: 2020-08-28 | Stop reason: HOSPADM

## 2020-08-28 RX ORDER — NALOXONE HCL 0.4 MG/ML
0.4 VIAL (ML) INJECTION AS NEEDED
Status: DISCONTINUED | OUTPATIENT
Start: 2020-08-28 | End: 2020-08-28 | Stop reason: HOSPADM

## 2020-08-28 RX ORDER — ONDANSETRON 2 MG/ML
4 INJECTION INTRAMUSCULAR; INTRAVENOUS ONCE AS NEEDED
Status: DISCONTINUED | OUTPATIENT
Start: 2020-08-28 | End: 2020-08-28 | Stop reason: HOSPADM

## 2020-08-28 RX ORDER — DEXAMETHASONE IN 0.9 % SOD CHL 10 MG/50ML
10 INTRAVENOUS SOLUTION, PIGGYBACK (ML) INTRAVENOUS
Status: COMPLETED | OUTPATIENT
Start: 2020-08-28 | End: 2020-08-28

## 2020-08-28 RX ORDER — MANNITOL 20 G/100ML
INJECTION, SOLUTION INTRAVENOUS CONTINUOUS PRN
Status: DISCONTINUED | OUTPATIENT
Start: 2020-08-28 | End: 2020-08-28 | Stop reason: SURG

## 2020-08-28 RX ORDER — SODIUM CHLORIDE 9 MG/ML
75 INJECTION, SOLUTION INTRAVENOUS CONTINUOUS
Status: DISCONTINUED | OUTPATIENT
Start: 2020-08-28 | End: 2020-08-29

## 2020-08-28 RX ORDER — CEFAZOLIN SODIUM 2 G/100ML
2 INJECTION, SOLUTION INTRAVENOUS EVERY 8 HOURS
Status: COMPLETED | OUTPATIENT
Start: 2020-08-28 | End: 2020-08-29

## 2020-08-28 RX ORDER — PROPOFOL 10 MG/ML
VIAL (ML) INTRAVENOUS AS NEEDED
Status: DISCONTINUED | OUTPATIENT
Start: 2020-08-28 | End: 2020-08-28 | Stop reason: SURG

## 2020-08-28 RX ORDER — HYDRALAZINE HYDROCHLORIDE 20 MG/ML
5 INJECTION INTRAMUSCULAR; INTRAVENOUS
Status: DISCONTINUED | OUTPATIENT
Start: 2020-08-28 | End: 2020-08-28 | Stop reason: HOSPADM

## 2020-08-28 RX ORDER — HYDROCODONE BITARTRATE AND ACETAMINOPHEN 5; 325 MG/1; MG/1
1 TABLET ORAL ONCE AS NEEDED
Status: DISCONTINUED | OUTPATIENT
Start: 2020-08-28 | End: 2020-08-28 | Stop reason: HOSPADM

## 2020-08-28 RX ORDER — CEFAZOLIN SODIUM 2 G/100ML
2 INJECTION, SOLUTION INTRAVENOUS ONCE
Status: COMPLETED | OUTPATIENT
Start: 2020-08-28 | End: 2020-08-28

## 2020-08-28 RX ORDER — SODIUM CHLORIDE, SODIUM LACTATE, POTASSIUM CHLORIDE, CALCIUM CHLORIDE 600; 310; 30; 20 MG/100ML; MG/100ML; MG/100ML; MG/100ML
100 INJECTION, SOLUTION INTRAVENOUS CONTINUOUS
Status: DISCONTINUED | OUTPATIENT
Start: 2020-08-28 | End: 2020-08-28

## 2020-08-28 RX ORDER — FAMOTIDINE 20 MG/1
20 TABLET, FILM COATED ORAL
Status: DISCONTINUED | OUTPATIENT
Start: 2020-08-28 | End: 2020-08-31 | Stop reason: HOSPADM

## 2020-08-28 RX ORDER — SODIUM CHLORIDE 0.9 % (FLUSH) 0.9 %
10 SYRINGE (ML) INJECTION AS NEEDED
Status: DISCONTINUED | OUTPATIENT
Start: 2020-08-28 | End: 2020-08-28 | Stop reason: HOSPADM

## 2020-08-28 RX ORDER — HYDROMORPHONE HYDROCHLORIDE 1 MG/ML
0.5 INJECTION, SOLUTION INTRAMUSCULAR; INTRAVENOUS; SUBCUTANEOUS
Status: DISCONTINUED | OUTPATIENT
Start: 2020-08-28 | End: 2020-08-31 | Stop reason: HOSPADM

## 2020-08-28 RX ORDER — ACETAMINOPHEN 325 MG/1
650 TABLET ORAL EVERY 6 HOURS PRN
Status: DISCONTINUED | OUTPATIENT
Start: 2020-08-28 | End: 2020-08-31 | Stop reason: HOSPADM

## 2020-08-28 RX ORDER — LIDOCAINE HYDROCHLORIDE 10 MG/ML
0.5 INJECTION, SOLUTION EPIDURAL; INFILTRATION; INTRACAUDAL; PERINEURAL ONCE AS NEEDED
Status: DISCONTINUED | OUTPATIENT
Start: 2020-08-28 | End: 2020-08-28 | Stop reason: HOSPADM

## 2020-08-28 RX ORDER — SODIUM CHLORIDE 0.9 % (FLUSH) 0.9 %
10 SYRINGE (ML) INJECTION EVERY 12 HOURS SCHEDULED
Status: DISCONTINUED | OUTPATIENT
Start: 2020-08-28 | End: 2020-08-28 | Stop reason: HOSPADM

## 2020-08-28 RX ORDER — LORAZEPAM 2 MG/ML
1 INJECTION INTRAMUSCULAR EVERY 4 HOURS PRN
Status: DISCONTINUED | OUTPATIENT
Start: 2020-08-28 | End: 2020-08-31 | Stop reason: HOSPADM

## 2020-08-28 RX ORDER — NALOXONE HCL 0.4 MG/ML
0.4 VIAL (ML) INJECTION
Status: DISCONTINUED | OUTPATIENT
Start: 2020-08-28 | End: 2020-08-31 | Stop reason: HOSPADM

## 2020-08-28 RX ORDER — LIDOCAINE HYDROCHLORIDE 10 MG/ML
INJECTION, SOLUTION EPIDURAL; INFILTRATION; INTRACAUDAL; PERINEURAL AS NEEDED
Status: DISCONTINUED | OUTPATIENT
Start: 2020-08-28 | End: 2020-08-28 | Stop reason: SURG

## 2020-08-28 RX ORDER — FAMOTIDINE 10 MG/ML
20 INJECTION, SOLUTION INTRAVENOUS ONCE
Status: COMPLETED | OUTPATIENT
Start: 2020-08-28 | End: 2020-08-28

## 2020-08-28 RX ORDER — FENTANYL CITRATE 50 UG/ML
50 INJECTION, SOLUTION INTRAMUSCULAR; INTRAVENOUS
Status: DISCONTINUED | OUTPATIENT
Start: 2020-08-28 | End: 2020-08-28 | Stop reason: HOSPADM

## 2020-08-28 RX ORDER — IPRATROPIUM BROMIDE AND ALBUTEROL SULFATE 2.5; .5 MG/3ML; MG/3ML
3 SOLUTION RESPIRATORY (INHALATION) ONCE
Status: DISCONTINUED | OUTPATIENT
Start: 2020-08-28 | End: 2020-08-28 | Stop reason: HOSPADM

## 2020-08-28 RX ORDER — IPRATROPIUM BROMIDE AND ALBUTEROL SULFATE 2.5; .5 MG/3ML; MG/3ML
3 SOLUTION RESPIRATORY (INHALATION) ONCE AS NEEDED
Status: DISCONTINUED | OUTPATIENT
Start: 2020-08-28 | End: 2020-08-28 | Stop reason: HOSPADM

## 2020-08-28 RX ORDER — OXYCODONE AND ACETAMINOPHEN 7.5; 325 MG/1; MG/1
2 TABLET ORAL EVERY 4 HOURS PRN
Status: DISCONTINUED | OUTPATIENT
Start: 2020-08-28 | End: 2020-08-31 | Stop reason: HOSPADM

## 2020-08-28 RX ORDER — FUROSEMIDE 10 MG/ML
INJECTION INTRAMUSCULAR; INTRAVENOUS AS NEEDED
Status: DISCONTINUED | OUTPATIENT
Start: 2020-08-28 | End: 2020-08-28 | Stop reason: SURG

## 2020-08-28 RX ORDER — DEXAMETHASONE SODIUM PHOSPHATE 4 MG/ML
4 INJECTION, SOLUTION INTRA-ARTICULAR; INTRALESIONAL; INTRAMUSCULAR; INTRAVENOUS; SOFT TISSUE EVERY 6 HOURS
Status: DISCONTINUED | OUTPATIENT
Start: 2020-08-28 | End: 2020-08-30

## 2020-08-28 RX ORDER — ONDANSETRON 4 MG/1
4 TABLET, FILM COATED ORAL EVERY 6 HOURS PRN
Status: DISCONTINUED | OUTPATIENT
Start: 2020-08-28 | End: 2020-08-31 | Stop reason: HOSPADM

## 2020-08-28 RX ADMIN — SODIUM CHLORIDE, POTASSIUM CHLORIDE, SODIUM LACTATE AND CALCIUM CHLORIDE 100 ML/HR: 600; 310; 30; 20 INJECTION, SOLUTION INTRAVENOUS at 06:12

## 2020-08-28 RX ADMIN — NICARDIPINE HYDROCHLORIDE 2.5 MG/HR: 0.1 INJECTION, SOLUTION INTRAVENOUS at 10:25

## 2020-08-28 RX ADMIN — DEXAMETHASONE SODIUM PHOSPHATE 4 MG: 4 INJECTION, SOLUTION INTRA-ARTICULAR; INTRALESIONAL; INTRAMUSCULAR; INTRAVENOUS; SOFT TISSUE at 00:05

## 2020-08-28 RX ADMIN — CEFAZOLIN SODIUM 2 G: 2 INJECTION, SOLUTION INTRAVENOUS at 06:58

## 2020-08-28 RX ADMIN — MANNITOL: 20 INJECTION, SOLUTION INTRAVENOUS at 07:09

## 2020-08-28 RX ADMIN — ACETAMINOPHEN 650 MG: 325 TABLET, FILM COATED ORAL at 22:11

## 2020-08-28 RX ADMIN — CEFAZOLIN SODIUM 2 G: 2 INJECTION, SOLUTION INTRAVENOUS at 15:09

## 2020-08-28 RX ADMIN — ATORVASTATIN CALCIUM 10 MG: 10 TABLET, FILM COATED ORAL at 20:40

## 2020-08-28 RX ADMIN — IPRATROPIUM BROMIDE AND ALBUTEROL SULFATE 3 ML: .5; 3 SOLUTION RESPIRATORY (INHALATION) at 06:38

## 2020-08-28 RX ADMIN — HYDROMORPHONE HYDROCHLORIDE 0.5 MG: 1 INJECTION, SOLUTION INTRAMUSCULAR; INTRAVENOUS; SUBCUTANEOUS at 10:07

## 2020-08-28 RX ADMIN — FAMOTIDINE 20 MG: 10 INJECTION INTRAVENOUS at 06:12

## 2020-08-28 RX ADMIN — Medication 10 MG: at 07:05

## 2020-08-28 RX ADMIN — LEVETIRACETAM 500 MG: 500 TABLET ORAL at 20:40

## 2020-08-28 RX ADMIN — ACETAMINOPHEN 650 MG: 325 TABLET, FILM COATED ORAL at 15:08

## 2020-08-28 RX ADMIN — MUPIROCIN: 20 OINTMENT TOPICAL at 06:43

## 2020-08-28 RX ADMIN — SODIUM CHLORIDE 75 ML/HR: 9 INJECTION, SOLUTION INTRAVENOUS at 14:52

## 2020-08-28 RX ADMIN — DEXAMETHASONE SODIUM PHOSPHATE 4 MG: 4 INJECTION, SOLUTION INTRA-ARTICULAR; INTRALESIONAL; INTRAMUSCULAR; INTRAVENOUS; SOFT TISSUE at 05:55

## 2020-08-28 RX ADMIN — DEXAMETHASONE SODIUM PHOSPHATE 4 MG: 4 INJECTION, SOLUTION INTRA-ARTICULAR; INTRALESIONAL; INTRAMUSCULAR; INTRAVENOUS; SOFT TISSUE at 11:32

## 2020-08-28 RX ADMIN — DEXAMETHASONE SODIUM PHOSPHATE 4 MG: 4 INJECTION, SOLUTION INTRA-ARTICULAR; INTRALESIONAL; INTRAMUSCULAR; INTRAVENOUS; SOFT TISSUE at 23:00

## 2020-08-28 RX ADMIN — PROPOFOL 200 MG: 10 INJECTION, EMULSION INTRAVENOUS at 07:03

## 2020-08-28 RX ADMIN — FUROSEMIDE 10 MG: 10 INJECTION, SOLUTION INTRAMUSCULAR; INTRAVENOUS at 07:39

## 2020-08-28 RX ADMIN — FAMOTIDINE 20 MG: 20 TABLET, FILM COATED ORAL at 17:35

## 2020-08-28 RX ADMIN — REMIFENTANIL HYDROCHLORIDE 0.25 MCG/KG/MIN: 1 INJECTION, POWDER, LYOPHILIZED, FOR SOLUTION INTRAVENOUS at 07:03

## 2020-08-28 RX ADMIN — LIDOCAINE HYDROCHLORIDE 50 MG: 10 INJECTION, SOLUTION EPIDURAL; INFILTRATION; INTRACAUDAL; PERINEURAL at 07:03

## 2020-08-28 RX ADMIN — POTASSIUM CHLORIDE AND SODIUM CHLORIDE 100 ML/HR: 450; 150 INJECTION, SOLUTION INTRAVENOUS at 11:11

## 2020-08-28 RX ADMIN — ATENOLOL 12.5 MG: 50 TABLET ORAL at 06:32

## 2020-08-28 RX ADMIN — SODIUM CHLORIDE, POTASSIUM CHLORIDE, SODIUM LACTATE AND CALCIUM CHLORIDE 100 ML/HR: 600; 310; 30; 20 INJECTION, SOLUTION INTRAVENOUS at 06:27

## 2020-08-28 RX ADMIN — LEVETIRACETAM 500 MG: 500 TABLET ORAL at 11:32

## 2020-08-28 RX ADMIN — DEXAMETHASONE SODIUM PHOSPHATE 4 MG: 4 INJECTION, SOLUTION INTRA-ARTICULAR; INTRALESIONAL; INTRAMUSCULAR; INTRAVENOUS; SOFT TISSUE at 17:35

## 2020-08-28 RX ADMIN — ROCURONIUM BROMIDE 50 MG: 10 INJECTION INTRAVENOUS at 07:03

## 2020-08-28 RX ADMIN — CEFAZOLIN SODIUM 2 G: 2 INJECTION, SOLUTION INTRAVENOUS at 23:00

## 2020-08-28 RX ADMIN — FENTANYL CITRATE 50 MCG: 0.05 INJECTION, SOLUTION INTRAMUSCULAR; INTRAVENOUS at 09:33

## 2020-08-28 NOTE — ANESTHESIA PROCEDURE NOTES
Airway  Urgency: elective    Date/Time: 8/28/2020 7:05 AM  Airway not difficult    General Information and Staff    Patient location during procedure: OR  CRNA: Shaquille Springer CRNA    Indications and Patient Condition  Indications for airway management: airway protection    Preoxygenated: yes  MILS not maintained throughout  Mask difficulty assessment: 1 - vent by mask    Final Airway Details  Final airway type: endotracheal airway      Successful airway: ETT  Cuffed: yes   Facilitating devices/methods: intubating stylet  Endotracheal tube insertion site: oral  Blade: Lang  Blade size: 3  ETT size (mm): 7.5  Cormack-Lehane Classification: grade I - full view of glottis  Number of attempts at approach: 1  Assessment: lips, teeth, and gum same as pre-op and atraumatic intubation    Additional Comments  Poor, discoloored dentition with significant gum disease and recession, equal breath sounds and symmetric chest rise and fall

## 2020-08-28 NOTE — ANESTHESIA PROCEDURE NOTES
Arterial Line      Patient reassessed immediately prior to procedure    Patient location during procedure: pre-op  Start time: 8/28/2020 6:30 AM   Line placed for hemodynamic monitoring.  Performed By   Anesthesiologist: Debbie Lantigua MD  Preanesthetic Checklist  Completed: patient identified, site marked, surgical consent, pre-op evaluation, timeout performed, IV checked, risks and benefits discussed and monitors and equipment checked  Arterial Line Prep   Sterile Tech: cap, gloves and sterile barriers  Prep: ChloraPrep  Patient monitoring: blood pressure monitoring, continuous pulse oximetry and EKG  Arterial Line Procedure   Laterality:right  Location:  radial artery  Catheter size: 20 G   Guidance: palpation technique  Number of attempts: 1  Successful placement: yes  Post Assessment   Dressing Type: line sutured, occlusive dressing applied, secured with tape and wrist guard applied.   Complications no  Circ/Move/Sens Assessment: normal and unchanged.   Patient Tolerance: patient tolerated the procedure well with no apparent complications

## 2020-08-28 NOTE — ANESTHESIA POSTPROCEDURE EVALUATION
Patient: Kennedy Reardon    Procedure Summary     Date:  08/28/20 Room / Location:   [facility]  [facility]    Anesthesia Start:  0658 Anesthesia Stop:  0915    Procedure:  CRANIOTOMY FOR TUMOR LEFT (Left Head) Diagnosis:       Brain mass      (Brain mass [G93.89])    Surgeon:  Gilbert Harrell MD Provider:  Debbie Lantigua MD    Anesthesia Type:  general ASA Status:  3          Anesthesia Type: general    Vitals  Vitals Value Taken Time   /78 8/28/2020 10:30 AM   Temp 97.9 °F (36.6 °C) 8/28/2020 10:00 AM   Pulse 69 8/28/2020 10:32 AM   Resp 14 8/28/2020 10:15 AM   SpO2 95 % 8/28/2020 10:32 AM   Vitals shown include unvalidated device data.        Post Anesthesia Care and Evaluation    Patient location during evaluation: PACU  Patient participation: complete - patient participated  Level of consciousness: awake and alert  Pain score: 0  Pain management: adequate  Airway patency: patent  Anesthetic complications: No anesthetic complications  PONV Status: none  Cardiovascular status: hemodynamically stable and acceptable  Respiratory status: nonlabored ventilation, acceptable and nasal cannula  Hydration status: acceptable

## 2020-08-29 ENCOUNTER — APPOINTMENT (OUTPATIENT)
Dept: CT IMAGING | Facility: HOSPITAL | Age: 55
End: 2020-08-29

## 2020-08-29 LAB
ANION GAP SERPL CALCULATED.3IONS-SCNC: 8 MMOL/L (ref 5–15)
BUN SERPL-MCNC: 12 MG/DL (ref 6–20)
BUN/CREAT SERPL: 14.3 (ref 7–25)
CALCIUM SPEC-SCNC: 8.8 MG/DL (ref 8.6–10.5)
CHLORIDE SERPL-SCNC: 99 MMOL/L (ref 98–107)
CO2 SERPL-SCNC: 28 MMOL/L (ref 22–29)
CREAT SERPL-MCNC: 0.84 MG/DL (ref 0.76–1.27)
DEPRECATED RDW RBC AUTO: 43.8 FL (ref 37–54)
ERYTHROCYTE [DISTWIDTH] IN BLOOD BY AUTOMATED COUNT: 12.3 % (ref 12.3–15.4)
GFR SERPL CREATININE-BSD FRML MDRD: 95 ML/MIN/1.73
GLUCOSE SERPL-MCNC: 122 MG/DL (ref 65–99)
HCT VFR BLD AUTO: 38.6 % (ref 37.5–51)
HGB BLD-MCNC: 12.8 G/DL (ref 13–17.7)
MCH RBC QN AUTO: 32 PG (ref 26.6–33)
MCHC RBC AUTO-ENTMCNC: 33.2 G/DL (ref 31.5–35.7)
MCV RBC AUTO: 96.5 FL (ref 79–97)
PLATELET # BLD AUTO: 195 10*3/MM3 (ref 140–450)
PMV BLD AUTO: 10.5 FL (ref 6–12)
POTASSIUM SERPL-SCNC: 4.1 MMOL/L (ref 3.5–5.2)
RBC # BLD AUTO: 4 10*6/MM3 (ref 4.14–5.8)
SODIUM SERPL-SCNC: 135 MMOL/L (ref 136–145)
WBC # BLD AUTO: 16.26 10*3/MM3 (ref 3.4–10.8)

## 2020-08-29 PROCEDURE — 94799 UNLISTED PULMONARY SVC/PX: CPT

## 2020-08-29 PROCEDURE — 85027 COMPLETE CBC AUTOMATED: CPT | Performed by: NEUROLOGICAL SURGERY

## 2020-08-29 PROCEDURE — 25010000002 DEXAMETHASONE PER 1 MG: Performed by: NEUROLOGICAL SURGERY

## 2020-08-29 PROCEDURE — 25010000003 CEFAZOLIN IN DEXTROSE 2-4 GM/100ML-% SOLUTION: Performed by: NEUROLOGICAL SURGERY

## 2020-08-29 PROCEDURE — 80048 BASIC METABOLIC PNL TOTAL CA: CPT | Performed by: NEUROLOGICAL SURGERY

## 2020-08-29 PROCEDURE — 70450 CT HEAD/BRAIN W/O DYE: CPT

## 2020-08-29 PROCEDURE — 99024 POSTOP FOLLOW-UP VISIT: CPT | Performed by: NEUROLOGICAL SURGERY

## 2020-08-29 PROCEDURE — 99232 SBSQ HOSP IP/OBS MODERATE 35: CPT | Performed by: INTERNAL MEDICINE

## 2020-08-29 RX ORDER — IPRATROPIUM BROMIDE AND ALBUTEROL SULFATE 2.5; .5 MG/3ML; MG/3ML
3 SOLUTION RESPIRATORY (INHALATION) EVERY 4 HOURS PRN
Status: DISCONTINUED | OUTPATIENT
Start: 2020-08-29 | End: 2020-08-31 | Stop reason: HOSPADM

## 2020-08-29 RX ORDER — IPRATROPIUM BROMIDE AND ALBUTEROL SULFATE 2.5; .5 MG/3ML; MG/3ML
3 SOLUTION RESPIRATORY (INHALATION)
Status: DISCONTINUED | OUTPATIENT
Start: 2020-08-29 | End: 2020-08-31 | Stop reason: HOSPADM

## 2020-08-29 RX ADMIN — SODIUM CHLORIDE 75 ML/HR: 9 INJECTION, SOLUTION INTRAVENOUS at 05:00

## 2020-08-29 RX ADMIN — FAMOTIDINE 20 MG: 20 TABLET, FILM COATED ORAL at 16:30

## 2020-08-29 RX ADMIN — IPRATROPIUM BROMIDE AND ALBUTEROL SULFATE 3 ML: 2.5; .5 SOLUTION RESPIRATORY (INHALATION) at 16:39

## 2020-08-29 RX ADMIN — LEVETIRACETAM 500 MG: 500 TABLET ORAL at 20:15

## 2020-08-29 RX ADMIN — CEFAZOLIN SODIUM 2 G: 2 INJECTION, SOLUTION INTRAVENOUS at 16:30

## 2020-08-29 RX ADMIN — CEFAZOLIN SODIUM 2 G: 2 INJECTION, SOLUTION INTRAVENOUS at 06:02

## 2020-08-29 RX ADMIN — LEVETIRACETAM 500 MG: 500 TABLET ORAL at 08:28

## 2020-08-29 RX ADMIN — ATORVASTATIN CALCIUM 10 MG: 10 TABLET, FILM COATED ORAL at 20:15

## 2020-08-29 RX ADMIN — ATENOLOL 50 MG: 50 TABLET ORAL at 08:28

## 2020-08-29 RX ADMIN — DEXAMETHASONE SODIUM PHOSPHATE 4 MG: 4 INJECTION, SOLUTION INTRA-ARTICULAR; INTRALESIONAL; INTRAMUSCULAR; INTRAVENOUS; SOFT TISSUE at 17:49

## 2020-08-29 RX ADMIN — ACETAMINOPHEN 650 MG: 325 TABLET, FILM COATED ORAL at 10:37

## 2020-08-29 RX ADMIN — FAMOTIDINE 20 MG: 20 TABLET, FILM COATED ORAL at 08:28

## 2020-08-29 RX ADMIN — DEXAMETHASONE SODIUM PHOSPHATE 4 MG: 4 INJECTION, SOLUTION INTRA-ARTICULAR; INTRALESIONAL; INTRAMUSCULAR; INTRAVENOUS; SOFT TISSUE at 05:45

## 2020-08-29 RX ADMIN — DEXAMETHASONE SODIUM PHOSPHATE 4 MG: 4 INJECTION, SOLUTION INTRA-ARTICULAR; INTRALESIONAL; INTRAMUSCULAR; INTRAVENOUS; SOFT TISSUE at 12:25

## 2020-08-29 RX ADMIN — CETIRIZINE HYDROCHLORIDE 10 MG: 10 TABLET, FILM COATED ORAL at 08:28

## 2020-08-30 PROBLEM — J98.01 BRONCHOSPASM: Status: ACTIVE | Noted: 2020-08-30

## 2020-08-30 LAB
ALBUMIN SERPL-MCNC: 3.4 G/DL (ref 3.5–5.2)
ANION GAP SERPL CALCULATED.3IONS-SCNC: 8 MMOL/L (ref 5–15)
BASOPHILS # BLD AUTO: 0.01 10*3/MM3 (ref 0–0.2)
BASOPHILS NFR BLD AUTO: 0.1 % (ref 0–1.5)
BUN SERPL-MCNC: 14 MG/DL (ref 6–20)
BUN/CREAT SERPL: 20.3 (ref 7–25)
CALCIUM SPEC-SCNC: 8.8 MG/DL (ref 8.6–10.5)
CHLORIDE SERPL-SCNC: 102 MMOL/L (ref 98–107)
CO2 SERPL-SCNC: 28 MMOL/L (ref 22–29)
CREAT SERPL-MCNC: 0.69 MG/DL (ref 0.76–1.27)
DEPRECATED RDW RBC AUTO: 42.7 FL (ref 37–54)
EOSINOPHIL # BLD AUTO: 0 10*3/MM3 (ref 0–0.4)
EOSINOPHIL NFR BLD AUTO: 0 % (ref 0.3–6.2)
ERYTHROCYTE [DISTWIDTH] IN BLOOD BY AUTOMATED COUNT: 12.1 % (ref 12.3–15.4)
GFR SERPL CREATININE-BSD FRML MDRD: 119 ML/MIN/1.73
GLUCOSE SERPL-MCNC: 115 MG/DL (ref 65–99)
HCT VFR BLD AUTO: 40.2 % (ref 37.5–51)
HGB BLD-MCNC: 13.2 G/DL (ref 13–17.7)
IMM GRANULOCYTES # BLD AUTO: 0.05 10*3/MM3 (ref 0–0.05)
IMM GRANULOCYTES NFR BLD AUTO: 0.5 % (ref 0–0.5)
LYMPHOCYTES # BLD AUTO: 0.99 10*3/MM3 (ref 0.7–3.1)
LYMPHOCYTES NFR BLD AUTO: 9.6 % (ref 19.6–45.3)
MCH RBC QN AUTO: 31.3 PG (ref 26.6–33)
MCHC RBC AUTO-ENTMCNC: 32.8 G/DL (ref 31.5–35.7)
MCV RBC AUTO: 95.3 FL (ref 79–97)
MONOCYTES # BLD AUTO: 0.94 10*3/MM3 (ref 0.1–0.9)
MONOCYTES NFR BLD AUTO: 9.1 % (ref 5–12)
NEUTROPHILS NFR BLD AUTO: 8.33 10*3/MM3 (ref 1.7–7)
NEUTROPHILS NFR BLD AUTO: 80.7 % (ref 42.7–76)
NRBC BLD AUTO-RTO: 0 /100 WBC (ref 0–0.2)
PHOSPHATE SERPL-MCNC: 4.1 MG/DL (ref 2.5–4.5)
PLAT MORPH BLD: NORMAL
PLATELET # BLD AUTO: 192 10*3/MM3 (ref 140–450)
PMV BLD AUTO: 11.1 FL (ref 6–12)
POTASSIUM SERPL-SCNC: 4.4 MMOL/L (ref 3.5–5.2)
RBC # BLD AUTO: 4.22 10*6/MM3 (ref 4.14–5.8)
RBC MORPH BLD: NORMAL
SODIUM SERPL-SCNC: 138 MMOL/L (ref 136–145)
WBC # BLD AUTO: 10.32 10*3/MM3 (ref 3.4–10.8)
WBC MORPH BLD: NORMAL

## 2020-08-30 PROCEDURE — 25010000002 DEXAMETHASONE PER 1 MG: Performed by: INTERNAL MEDICINE

## 2020-08-30 PROCEDURE — 85025 COMPLETE CBC W/AUTO DIFF WBC: CPT | Performed by: INTERNAL MEDICINE

## 2020-08-30 PROCEDURE — 80069 RENAL FUNCTION PANEL: CPT | Performed by: INTERNAL MEDICINE

## 2020-08-30 PROCEDURE — 85007 BL SMEAR W/DIFF WBC COUNT: CPT | Performed by: INTERNAL MEDICINE

## 2020-08-30 PROCEDURE — 25010000002 DEXAMETHASONE PER 1 MG: Performed by: NEUROLOGICAL SURGERY

## 2020-08-30 PROCEDURE — 99232 SBSQ HOSP IP/OBS MODERATE 35: CPT | Performed by: INTERNAL MEDICINE

## 2020-08-30 PROCEDURE — 94799 UNLISTED PULMONARY SVC/PX: CPT

## 2020-08-30 RX ORDER — DOCUSATE SODIUM 100 MG/1
100 CAPSULE, LIQUID FILLED ORAL 2 TIMES DAILY
Status: DISCONTINUED | OUTPATIENT
Start: 2020-08-30 | End: 2020-08-31 | Stop reason: HOSPADM

## 2020-08-30 RX ORDER — DEXAMETHASONE 4 MG/1
4 TABLET ORAL EVERY 6 HOURS SCHEDULED
Status: DISCONTINUED | OUTPATIENT
Start: 2020-08-31 | End: 2020-08-31 | Stop reason: HOSPADM

## 2020-08-30 RX ADMIN — DEXAMETHASONE SODIUM PHOSPHATE 4 MG: 4 INJECTION, SOLUTION INTRA-ARTICULAR; INTRALESIONAL; INTRAMUSCULAR; INTRAVENOUS; SOFT TISSUE at 17:02

## 2020-08-30 RX ADMIN — DEXAMETHASONE SODIUM PHOSPHATE 4 MG: 4 INJECTION, SOLUTION INTRA-ARTICULAR; INTRALESIONAL; INTRAMUSCULAR; INTRAVENOUS; SOFT TISSUE at 00:03

## 2020-08-30 RX ADMIN — ACETAMINOPHEN 650 MG: 325 TABLET, FILM COATED ORAL at 17:02

## 2020-08-30 RX ADMIN — ACETAMINOPHEN 650 MG: 325 TABLET, FILM COATED ORAL at 12:17

## 2020-08-30 RX ADMIN — IPRATROPIUM BROMIDE AND ALBUTEROL SULFATE 3 ML: 2.5; .5 SOLUTION RESPIRATORY (INHALATION) at 08:14

## 2020-08-30 RX ADMIN — LEVETIRACETAM 500 MG: 500 TABLET ORAL at 08:42

## 2020-08-30 RX ADMIN — CETIRIZINE HYDROCHLORIDE 10 MG: 10 TABLET, FILM COATED ORAL at 08:42

## 2020-08-30 RX ADMIN — DEXAMETHASONE SODIUM PHOSPHATE 4 MG: 4 INJECTION, SOLUTION INTRA-ARTICULAR; INTRALESIONAL; INTRAMUSCULAR; INTRAVENOUS; SOFT TISSUE at 12:09

## 2020-08-30 RX ADMIN — DEXAMETHASONE 4 MG: 4 TABLET ORAL at 23:34

## 2020-08-30 RX ADMIN — DOCUSATE SODIUM 100 MG: 100 CAPSULE, LIQUID FILLED ORAL at 21:04

## 2020-08-30 RX ADMIN — ATENOLOL 50 MG: 50 TABLET ORAL at 08:42

## 2020-08-30 RX ADMIN — FAMOTIDINE 20 MG: 20 TABLET, FILM COATED ORAL at 08:42

## 2020-08-30 RX ADMIN — LEVETIRACETAM 500 MG: 500 TABLET ORAL at 21:04

## 2020-08-30 RX ADMIN — DEXAMETHASONE SODIUM PHOSPHATE 4 MG: 4 INJECTION, SOLUTION INTRA-ARTICULAR; INTRALESIONAL; INTRAMUSCULAR; INTRAVENOUS; SOFT TISSUE at 05:58

## 2020-08-30 RX ADMIN — ATORVASTATIN CALCIUM 10 MG: 10 TABLET, FILM COATED ORAL at 21:04

## 2020-08-30 RX ADMIN — IPRATROPIUM BROMIDE AND ALBUTEROL SULFATE 3 ML: 2.5; .5 SOLUTION RESPIRATORY (INHALATION) at 19:49

## 2020-08-30 RX ADMIN — FAMOTIDINE 20 MG: 20 TABLET, FILM COATED ORAL at 17:02

## 2020-08-31 ENCOUNTER — APPOINTMENT (OUTPATIENT)
Dept: GENERAL RADIOLOGY | Facility: HOSPITAL | Age: 55
End: 2020-08-31

## 2020-08-31 ENCOUNTER — APPOINTMENT (OUTPATIENT)
Dept: MRI IMAGING | Facility: HOSPITAL | Age: 55
End: 2020-08-31

## 2020-08-31 VITALS
RESPIRATION RATE: 16 BRPM | HEART RATE: 72 BPM | HEIGHT: 68 IN | SYSTOLIC BLOOD PRESSURE: 163 MMHG | TEMPERATURE: 97.8 F | OXYGEN SATURATION: 96 % | WEIGHT: 208.78 LBS | BODY MASS INDEX: 31.64 KG/M2 | DIASTOLIC BLOOD PRESSURE: 98 MMHG

## 2020-08-31 LAB
ANION GAP SERPL CALCULATED.3IONS-SCNC: 11 MMOL/L (ref 5–15)
BASOPHILS # BLD AUTO: 0.01 10*3/MM3 (ref 0–0.2)
BASOPHILS NFR BLD AUTO: 0.1 % (ref 0–1.5)
BUN SERPL-MCNC: 16 MG/DL (ref 6–20)
BUN/CREAT SERPL: 18.8 (ref 7–25)
CALCIUM SPEC-SCNC: 9 MG/DL (ref 8.6–10.5)
CHLORIDE SERPL-SCNC: 102 MMOL/L (ref 98–107)
CO2 SERPL-SCNC: 22 MMOL/L (ref 22–29)
CREAT SERPL-MCNC: 0.85 MG/DL (ref 0.76–1.27)
DEPRECATED RDW RBC AUTO: 44.4 FL (ref 37–54)
EOSINOPHIL # BLD AUTO: 0.01 10*3/MM3 (ref 0–0.4)
EOSINOPHIL NFR BLD AUTO: 0.1 % (ref 0.3–6.2)
ERYTHROCYTE [DISTWIDTH] IN BLOOD BY AUTOMATED COUNT: 12.2 % (ref 12.3–15.4)
GFR SERPL CREATININE-BSD FRML MDRD: 94 ML/MIN/1.73
GLUCOSE SERPL-MCNC: 109 MG/DL (ref 65–99)
HCT VFR BLD AUTO: 44.5 % (ref 37.5–51)
HGB BLD-MCNC: 14.5 G/DL (ref 13–17.7)
IMM GRANULOCYTES # BLD AUTO: 0.08 10*3/MM3 (ref 0–0.05)
IMM GRANULOCYTES NFR BLD AUTO: 0.8 % (ref 0–0.5)
LYMPHOCYTES # BLD AUTO: 1.39 10*3/MM3 (ref 0.7–3.1)
LYMPHOCYTES NFR BLD AUTO: 13.5 % (ref 19.6–45.3)
MCH RBC QN AUTO: 32.2 PG (ref 26.6–33)
MCHC RBC AUTO-ENTMCNC: 32.6 G/DL (ref 31.5–35.7)
MCV RBC AUTO: 98.7 FL (ref 79–97)
MONOCYTES # BLD AUTO: 0.95 10*3/MM3 (ref 0.1–0.9)
MONOCYTES NFR BLD AUTO: 9.3 % (ref 5–12)
NEUTROPHILS NFR BLD AUTO: 7.83 10*3/MM3 (ref 1.7–7)
NEUTROPHILS NFR BLD AUTO: 76.2 % (ref 42.7–76)
NRBC BLD AUTO-RTO: 0 /100 WBC (ref 0–0.2)
PLATELET # BLD AUTO: 170 10*3/MM3 (ref 140–450)
PMV BLD AUTO: 11.4 FL (ref 6–12)
POTASSIUM SERPL-SCNC: 4.8 MMOL/L (ref 3.5–5.2)
RBC # BLD AUTO: 4.51 10*6/MM3 (ref 4.14–5.8)
SODIUM SERPL-SCNC: 135 MMOL/L (ref 136–145)
WBC # BLD AUTO: 10.27 10*3/MM3 (ref 3.4–10.8)

## 2020-08-31 PROCEDURE — 94799 UNLISTED PULMONARY SVC/PX: CPT

## 2020-08-31 PROCEDURE — 71045 X-RAY EXAM CHEST 1 VIEW: CPT

## 2020-08-31 PROCEDURE — 70553 MRI BRAIN STEM W/O & W/DYE: CPT

## 2020-08-31 PROCEDURE — 63710000001 DEXAMETHASONE PER 0.25 MG: Performed by: NEUROLOGICAL SURGERY

## 2020-08-31 PROCEDURE — 85025 COMPLETE CBC W/AUTO DIFF WBC: CPT | Performed by: INTERNAL MEDICINE

## 2020-08-31 PROCEDURE — 0 GADOBENATE DIMEGLUMINE 529 MG/ML SOLUTION: Performed by: INTERNAL MEDICINE

## 2020-08-31 PROCEDURE — 80048 BASIC METABOLIC PNL TOTAL CA: CPT | Performed by: INTERNAL MEDICINE

## 2020-08-31 PROCEDURE — A9577 INJ MULTIHANCE: HCPCS | Performed by: INTERNAL MEDICINE

## 2020-08-31 PROCEDURE — 99232 SBSQ HOSP IP/OBS MODERATE 35: CPT | Performed by: INTERNAL MEDICINE

## 2020-08-31 RX ORDER — LEVETIRACETAM 500 MG/1
500 TABLET ORAL EVERY 12 HOURS SCHEDULED
Qty: 180 TABLET | Refills: 4 | Status: SHIPPED | OUTPATIENT
Start: 2020-08-31 | End: 2020-09-01

## 2020-08-31 RX ORDER — OXYCODONE AND ACETAMINOPHEN 7.5; 325 MG/1; MG/1
1 TABLET ORAL EVERY 4 HOURS PRN
Qty: 35 TABLET | Refills: 0 | Status: SHIPPED | OUTPATIENT
Start: 2020-08-31 | End: 2020-09-17

## 2020-08-31 RX ORDER — DEXAMETHASONE 4 MG/1
4 TABLET ORAL 3 TIMES DAILY
Qty: 40 TABLET | Refills: 1 | Status: SHIPPED | OUTPATIENT
Start: 2020-08-31 | End: 2020-09-01

## 2020-08-31 RX ORDER — PSEUDOEPHEDRINE HCL 30 MG
100 TABLET ORAL 2 TIMES DAILY PRN
Qty: 30 EACH | Refills: 2 | Status: SHIPPED | OUTPATIENT
Start: 2020-08-31 | End: 2020-09-01

## 2020-08-31 RX ADMIN — IPRATROPIUM BROMIDE AND ALBUTEROL SULFATE 3 ML: 2.5; .5 SOLUTION RESPIRATORY (INHALATION) at 07:03

## 2020-08-31 RX ADMIN — DEXAMETHASONE 4 MG: 4 TABLET ORAL at 05:08

## 2020-08-31 RX ADMIN — CETIRIZINE HYDROCHLORIDE 10 MG: 10 TABLET, FILM COATED ORAL at 08:24

## 2020-08-31 RX ADMIN — LEVETIRACETAM 500 MG: 500 TABLET ORAL at 08:24

## 2020-08-31 RX ADMIN — FAMOTIDINE 20 MG: 20 TABLET, FILM COATED ORAL at 08:23

## 2020-08-31 RX ADMIN — ATENOLOL 50 MG: 50 TABLET ORAL at 08:24

## 2020-08-31 RX ADMIN — GADOBENATE DIMEGLUMINE 20 ML: 529 INJECTION, SOLUTION INTRAVENOUS at 08:45

## 2020-09-01 ENCOUNTER — READMISSION MANAGEMENT (OUTPATIENT)
Dept: CALL CENTER | Facility: HOSPITAL | Age: 55
End: 2020-09-01

## 2020-09-01 RX ORDER — LEVETIRACETAM 500 MG/1
500 TABLET ORAL EVERY 12 HOURS SCHEDULED
Qty: 180 TABLET | Refills: 4 | Status: SHIPPED | OUTPATIENT
Start: 2020-09-01 | End: 2021-11-22 | Stop reason: SDUPTHER

## 2020-09-01 RX ORDER — DEXAMETHASONE 4 MG/1
4 TABLET ORAL 3 TIMES DAILY
Qty: 40 TABLET | Refills: 0 | Status: SHIPPED | OUTPATIENT
Start: 2020-09-01 | End: 2020-09-17

## 2020-09-01 RX ORDER — PSEUDOEPHEDRINE HCL 30 MG
100 TABLET ORAL 2 TIMES DAILY PRN
Qty: 30 EACH | Refills: 2 | Status: SHIPPED | OUTPATIENT
Start: 2020-09-01 | End: 2021-09-16

## 2020-09-01 NOTE — OUTREACH NOTE
Prep Survey      Responses   [facility] patient discharged from?  [facility]   Is LACE score < 7 ?  No   Eligibility  Readm Mgmt   Discharge diagnosis  Temporal glioma, s/p Craniotomy for tumor removal, HTN, Dyslipidemia, alcohol use   COVID-19 Test Status  Negative   Does the patient have one of the following disease processes/diagnoses(primary or secondary)?  General Surgery   Does the patient have Home health ordered?  No   Is there a DME ordered?  No   Comments regarding appointments  Wife to schedule F/U appointments   Medication alerts for this patient  see AVS   Prep survey completed?  Yes          Lin Dietrich RN

## 2020-09-01 NOTE — PAYOR COMM NOTE
"Makenzie Garg RN  Utilization Review  P: [phone]  F: [phone]    Ref # [id number]  DC date = 2020      Irais Coreas (55 y.o. Male)     Date of Birth Social Security Number Address Home Phone MRN    1965  [id number]  [facility] [address] [phone] [id number]    Denominational Marital Status          None        Admission Date Admission Type Admitting Provider Attending Provider Department, Room/Bed    20 Emergency Sobeida Sun DO  [facility] 3H, S370/1    Discharge Date Discharge Disposition Discharge Destination        2020 Home or Self Care              Attending Provider:  (none)   Allergies:  No Known Allergies    Isolation:  None   Infection:  None   Code Status:  Prior    Ht:  172.7 cm (68\")   Wt:  94.7 kg (208 lb 12.4 oz)    Admission Cmt:  None   Principal Problem:  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                 Active Insurance as of 2020     Primary Coverage     Payor Plan Insurance Group Employer/Plan Group    [employer] [place] [employer]ICA[place]      Payor Plan Address Payor Plan Phone Number Payor Plan Fax Number Effective Dates    [address] [address] [phone]  2018 - None Entered    [facility] [address]       Subscriber Name Subscriber Birth Date Member ID       IRAIS COREAS 1965 [id number]                 Emergency Contacts      (Rel.) Home Phone Work Phone Mobile Phone    Jesús Coreas (Spouse) [phone] -- --               Physician Progress Notes (last 72 hours) (Notes from 20 1105 through 20 1105)      Sobeida Sun DO at 20 1034              [facility] Medicine Services  PROGRESS NOTE    Patient Name: Irais Coreas  : 1965  MRN: [id number]    Date of Admission: 2020  Primary Care Physician: Feliberto Moore MD    Subjective   Subjective     CC:  Seizure    HPI:  No acute events.  States that he overall " feels okay today.  Has not had a bowel movement but states when he gets home he will have no issues.    Review of Systems  Gen- No fevers, chills  CV- No chest pain, palpitations  Resp- No cough, dyspnea  GI- No N/V/D, abd pain     Objective   Objective     Vital Signs:   Temp:  [97.6 °F (36.4 °C)-98.1 °F (36.7 °C)] 97.8 °F (36.6 °C)  Heart Rate:  [60-72] 72  Resp:  [14-18] 16  BP: (118-156)/(76-94) 155/93        Physical Exam:  Constitutional: No acute distress, awake, alert  HENT: NCAT, mucous membranes moist; left incision clean/dry  Respiratory: Clear to auscultation bilaterally, respiratory effort normal   Cardiovascular: RRR, no murmurs, rubs, or gallops, palpable pedal pulses bilaterally  Gastrointestinal: Positive bowel sounds, soft, nontender, nondistended  Musculoskeletal: No bilateral ankle edema  Psychiatric: flat affect, cooperative  Neurologic: Oriented x 3, strength symmetric in all extremities, Cranial Nerves grossly intact to confrontation, speech clear  Skin: No rashes    Results Reviewed:  Results from last 7 days   Lab Units 08/31/20 0420 08/30/20 0459 08/29/20 0552 08/28/20  0457   WBC 10*3/mm3 10.27 10.32 16.26* 15.39*   HEMOGLOBIN g/dL 14.5 13.2 12.8* 13.6   HEMATOCRIT % 44.5 40.2 38.6 40.7   PLATELETS 10*3/mm3 170 192 195 195   INR   --   --   --  1.02     Results from last 7 days   Lab Units 08/31/20  0420 08/30/20 0459 08/29/20 0552  08/26/20  1557   SODIUM mmol/L 135* 138 135*   < > 136   POTASSIUM mmol/L 4.8 4.4 4.1   < > 4.2   CHLORIDE mmol/L 102 102 99   < > 101   CO2 mmol/L 22.0 28.0 28.0   < > 27.0   BUN mg/dL 16 14 12   < > 7   CREATININE mg/dL 0.85 0.69* 0.84   < > 0.88   GLUCOSE mg/dL 109* 115* 122*   < > 117*   CALCIUM mg/dL 9.0 8.8 8.8   < > 9.1   ALT (SGPT) U/L  --   --   --   --  26   AST (SGOT) U/L  --   --   --   --  29   TROPONIN T ng/mL  --   --   --   --  <0.010   PROBNP pg/mL  --   --   --   --  358.6    < > = values in this interval not displayed.     Estimated  Creatinine Clearance: 110.9 mL/min (by C-G formula based on SCr of 0.85 mg/dL).    Microbiology Results Abnormal     Procedure Component Value - Date/Time    COVID PRE-OP / PRE-PROCEDURE SCREENING ORDER (NO ISOLATION) - Swab, Nasopharynx [[id number]] Collected:  08/27/20 0909    Lab Status:  Final result Specimen:  Swab from Nasopharynx Updated:  08/27/20 1046    Narrative:       The following orders were created for panel order COVID PRE-OP / PRE-PROCEDURE SCREENING ORDER (NO ISOLATION) - Swab, Nasopharynx.  Procedure                               Abnormality         Status                     ---------                               -----------         ------                     Respiratory Panel PCR w/...[[id number]]  Normal              Final result                 Please view results for these tests on the individual orders.    Respiratory Panel PCR w/COVID-19(SARS-CoV-2) [facility] In-House, NP Swab in UTM/VTM, 3-4 HR TAT - Swab, Nasopharynx [[id number]]  (Normal) Collected:  08/27/20 0909    Lab Status:  Final result Specimen:  Swab from Nasopharynx Updated:  08/27/20 1046     ADENOVIRUS, PCR Not Detected     Coronavirus 229E Not Detected     Coronavirus HKU1 Not Detected     Coronavirus NL63 Not Detected     Coronavirus OC43 Not Detected     COVID19 Not Detected     Human Metapneumovirus Not Detected     Human Rhinovirus/Enterovirus Not Detected     Influenza A PCR Not Detected     Influenza A H1 Not Detected     Influenza A H1 2009 PCR Not Detected     Influenza A H3 Not Detected     Influenza B PCR Not Detected     Parainfluenza Virus 1 Not Detected     Parainfluenza Virus 2 Not Detected     Parainfluenza Virus 3 Not Detected     Parainfluenza Virus 4 Not Detected     RSV, PCR Not Detected     Bordetella pertussis pcr Not Detected     Bordetella parapertussis PCR Not Detected     Chlamydophila pneumoniae PCR Not Detected     Mycoplasma pneumo by PCR Not Detected    Narrative:       Fact sheet for  providers: https://docs.[employer]/wp-content/uploads/JZC0816-3115-SF8.1-EUA-Provider-Fact-Sheet-3.pdf    Fact sheet for patients: https://docs.[employer]/wp-content/uploads/QDG3250-0645-UL4.1-EUA-Patient-Fact-Sheet-1.pdf          Imaging Results (Last 24 Hours)     Procedure Component Value Units Date/Time    XR Chest 1 View [[id number]] Collected:  08/31/20 0859     Updated:  08/31/20 0916    Narrative:       EXAMINATION: XR CHEST 1 VW-      INDICATION: Postop craniotomy, diffuse wheezing; G93.89-Other specified  disorders of brain; R56.9-Unspecified convulsions; D72.829-Elevated  white blood cell count, unspecified; R13.10-Dysphagia, unspecified.      COMPARISON: 08/26/2020.     FINDINGS: Portable chest reveals cardiac and mediastinal silhouettes  within normal limits. The lung fields are grossly clear. No focal  parenchymal opacification is present. No pleural effusion or  pneumothorax. The bony structures are unremarkable. Pulmonary  vascularity is within normal limits.           Impression:       No acute cardiopulmonary disease.     D:  08/31/2020  E:  08/31/2020          MRI Brain With & Without Contrast [[id number]] Collected:  08/31/20 0853     Updated:  08/31/20 0913    Narrative:       EXAMINATION: MRI BRAIN W WO CONTRAST-08/31/2020:     INDICATION: S/P brain tumor; G93.89-Other specified disorders of brain;  R56.9-Unspecified convulsions; D72.829-Elevated white blood cell count,  unspecified; R13.10-Dysphagia, unspecified; C71.9-Malignant neoplasm of  brain, unspecified.      TECHNIQUE: Multiplanar MRI of the brain with and without intravenous  contrast.     COMPARISON: MRI brain 08/27/2020, CTA head 08/29/2020.     FINDINGS: No restricted diffusion to suggest acute ischemia.  Postsurgical changes left temporal lobe from postoperative craniotomy  findings including layering minimal blood products and associated  pneumocephalus as well as edema in this region. Peripheral dural  enhancement with mild  irregularity of the left inferior lateral margin  with attention on follow-up, however, no soft tissue nodular or mass-  occupying focus otherwise noted at this site or elsewhere with expected  and appropriate vascular enhancement. No midline shift or hydrocephalus.  Globes and orbits are unremarkable. The paranasal sinuses and mastoid  air cells demonstrate grossly clear and well-pneumatized appearance.  Pituitary and sella within normal limits. Cervicomedullary junction  widely patent.       Impression:       Postsurgical changes left temporal craniotomy and resection  with expected early postoperative changes including dural enhancement  mildly prominent along the left inferolateral margin of the resection  site with attention on follow-up otherwise, no residual soft tissue  nodular enhancement or mass-occupying focus of enhancement. No midline  shift or hydrocephalus.     D:  08/31/2020  E:  08/31/2020                         I have reviewed the medications:  Scheduled Meds:  atenolol 50 mg Oral Q24H   atorvastatin 10 mg Oral Nightly   cetirizine 10 mg Oral Daily   dexamethasone 4 mg Oral Q6H   docusate sodium 100 mg Oral BID   famotidine 20 mg Oral BID AC   ipratropium-albuterol 3 mL Nebulization 4x Daily - RT   levETIRAcetam 500 mg Oral Q12H     Continuous Infusions:   PRN Meds:.•  acetaminophen  •  HYDROmorphone **AND** naloxone  •  ipratropium-albuterol  •  LORazepam  •  ondansetron **OR** ondansetron  •  oxyCODONE-acetaminophen    Assessment/Plan   Assessment & Plan     Active Hospital Problems    Diagnosis  POA   • **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        Brief Hospital Course  to date:  Mr. Reardon with a history of HTN and ETOH use who presented with seizure. Found to have a left temporal brain mass with edema and shift.  Patient was started on Decadron and Keppra.  He was evaluated by neurosurgery and subsequently had a left temporal craniotomy resection on 8/28.  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  Patient was admitted to the ICU for monitoring after surgery and overall did well and was transferred to medicine service on 8/31.      L temporal brain mass, with mild edema, small shift  Seizure  --s/p IV Keppra, s/p decadron  -Continue on discharge per neurosurgery.  -Speech therapy referral given to patient.  -Patient is to call Dr. Harrell at [facility] on Thursday to arrange follow-up.     Hx of HTN, tachycardia per wife  --continue atenolol and resume losartan on discharge     ETOH use  --ativan per [facility] protocol as ordered  --thiamine/folate      HL  --Continued statin     Tobacco abuse  --nicotine patch prn     DVT prophylaxis:  SCDS     Disposition: I expect the patient to be discharged   today with close follow-up with neurosurgery    CODE STATUS:   Code Status and Medical Interventions:   Ordered at: 08/26/20 1511     Level Of Support Discussed With:    Patient     Code Status:    CPR     Medical Interventions (Level of Support Prior to Arrest):    Full         Electronically signed by Sobeida Sun DO, 08/31/20, 10:34.        Electronically signed by Sobeida Sun DO at 08/31/20 1039     Joselo Wooten MD at 08/30/20 1719          Intensivist Note     8/30/2020  Hospital Day: 4  2 Days Post-Op  ICU Stays Timeline            Hospital Admission: 08/26/20 1249 - Current  ICU stays: 1      In Date/Time Event Department ICU Stay Duration     08/26/20 1249 Admission  [facility]      08/26/20 2040 Transfer In  [facility] 3G      08/28/20 0605 Transfer In  [facility] OR      08/28/20 1102 Transfer In  [facility] 2B ICU  "2 days 4 hours 52 minutes     08/30/20 1554 Transfer In  [facility] 3H           Mr. Kennedy Reardon, 55 y.o. 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  Patient was admitted 8/26/2020 after 2 witnessed seizures with postictal state.  After becoming more alert he had difficulty with word finding and would answer incorrectly at times but could follow commands. There was no evidence of asymmetric weakness or sensory loss and gait was normal.  CT of the head revealed a 5 cm left temporal lobe cystic mass and patient was begun on Keppra and Decadron.  Subsequently on 8/28/2020 patient underwent left temporal craniotomy and resection.  Frozen section consistent with astrocytoma but final pathology pending.  There was no excessive bleeding or other complications and patient was moved to the ICU for close observation.     Interval history: No problems overnight.  Mild discomfort at the operative site but not requiring excess narcotics.  No witnessed seizure activity.  No diplopia or unilateral weakness. No  chest pain or dyspnea. No nausea, vomiting, or difficulty urinating.  Continues to have mild wheezing on exam but O2 sats are 98% on room air.  No bowel movement since admission.  No urinary complaints.       ROS: Per subjective, all other systems reviewed and were negative.    The patient's relevant PMH, PSH, FH, and SH were reviewed and updated in Epic as appropriate. Allergies and Medications reviewed.    OBJECTIVE     /94 (BP Location: Left arm, Patient Position: Lying)   Pulse 60   Temp 98.1 °F (36.7 °C) (Oral)   Resp 16   Ht 172.7 cm (68\")   Wt 94.7 kg (208 lb 12.4 oz)   SpO2 95%   BMI 31.74 kg/m²     " "  Presently on room air    Flowsheet Rows      First Filed Value   Admission Height  175.3 cm (69\") Documented at 08/26/2020 1304   Admission Weight  95.3 kg (210 lb) Documented at 08/26/2020 1304        Intake & Output (last day)       08/29 0701 - 08/30 0700 08/30 0701 - 08/31 0700    P.O.      I.V. (mL/kg) 160 (1.7)     IV Piggyback 100     Total Intake(mL/kg) 260 (2.7)     Urine (mL/kg/hr) 1625 (0.7)     Total Output 1625     Net -1365           Urine Unmeasured Occurrence 3 x           Exam:  General Exam:  Well-developed middle-aged bearded white male sitting up in chair in NAD  HEENT: Left temporal dressing clean and dry pupils equal and reactive. Nose and throat clear.  Neck:                          Supple, no JVD, thyromegaly, or adenopathy  Lungs: Scattered rhonchi with an occasional wheeze.  No rales noted.  Percussion normal  Cardiovascular: RRR without murmurs or gallops.  HR 60 bpm  Abdomen: Soft nontender without organomegaly or masses.   and rectal: Deferred.  Extremities: No cyanosis clubbing edema.  Neurologic:                 Symmetric strength. No focal deficits.  Clear speech.  Oriented x3.    Chest X-Ray: No recent film      Results from last 7 days   Lab Units 08/30/20  0459 08/29/20  0552 08/28/20  0457   WBC 10*3/mm3 10.32 16.26* 15.39*   HEMOGLOBIN g/dL 13.2 12.8* 13.6   HEMATOCRIT % 40.2 38.6 40.7   PLATELETS 10*3/mm3 192 195 195     Results from last 7 days   Lab Units 08/30/20  0459 08/29/20  0552   SODIUM mmol/L 138 135*   POTASSIUM mmol/L 4.4 4.1   CHLORIDE mmol/L 102 99   CO2 mmol/L 28.0 28.0   BUN mg/dL 14 12   CREATININE mg/dL 0.69* 0.84   GLUCOSE mg/dL 115* 122*   CALCIUM mg/dL 8.8 8.8     Results from last 7 days   Lab Units 08/30/20  0459   PHOSPHORUS mg/dL 4.1     Results from last 7 days   Lab Units 08/26/20  1557   ALK PHOS U/L 82   BILIRUBIN mg/dL 0.4   ALT (SGPT) U/L 26   AST (SGOT) U/L 29       No results found for: SEDRATE  No results found for: BNP  Lab Results "   Component Value Date    TROPONINT <0.010 08/26/2020     No results found for: TSH  No results found for: LACTATE  No results found for: CORTISOL      I reviewed the patient's results, images and medication.    Assessment/Plan   ASSESSMENT        5 X 3.8cm L temporal lobe glioma s/p crani and excision 8/28/2020    Witnessed sz     Dyslipidemia    Hypertension    Current smoker    Bronchospasm    Alcohol use    Class 1 obesity in adult      DISCUSSION: Excellent postop course.  No focal neurologic findings postop.  Must have some degree of COPD as has some mild wheezing which is slightly better with bronchodilators.  No problems with gas exchange and tolerating room air.  Was ordered to the floor 8/29/2020 but no beds.  Is however moving to telemetry today.    PLAN     1.  Transfer to telemetry and we will asked the hospitalists to assume attending role  2.  Continue on Keppra  3.  Follow-up final pathology Monday  4.  Plan for surgery regarding any additional therapy other than resection  5.  Home when surgery indicates    Plan of care and goals reviewed with mulitdisciplinary team at daily rounds.    I discussed the patient's findings and my recommendations with patient and nursing staff      Time spent Critical care 20 min (It does not include procedure time).    Electronically signed by Joselo Wooten MD, 08/30/20, 5:19 PM.   Pulmonary / Critical care medicine       Electronically signed by Joselo Wooten MD at 08/30/20 1731     Joselo Wooten MD at 08/29/20 1734          Intensivist Note     8/30/2020  Hospital Day: 4  1 Day Post-Op      Mr. Kennedy Reardon, 55 y.o. 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  Patient was admitted 8/26/2020 after 2 witnessed seizures with postictal state.  After becoming more alert he had difficulty with word finding and would answer incorrectly at times but could follow commands.  There was no evidence of asymmetric weakness or sensory loss and gait was normal.  CT of the head revealed a 5 cm left temporal lobe cystic mass and patient was begun on Keppra and Decadron.  Subsequently on 8/28/2020 patient underwent left temporal craniotomy and resection of what appeared to be a glioma.  There was no excessive bleeding or other complications and patient was moved to the ICU for close observation.    Interval history: No problems overnight.  Has the usual amount of headache at the operative site but he does not appear in any distress.  No seizures witnessed.  No nausea, vomiting, diplopia, unilateral weakness, chest pain, dyspnea, or difficulty urinating.  No complaints of wheezing or cough but he is obviously wheezing on exam (although denies COPD).  O2 sats are 93% on room air.      ROS: Per subjective, all other systems reviewed and were negative.    The patient's relevant PMH, PSH, FH, and SH were reviewed and updated in Epic as appropriate. Allergies and Medications reviewed.    OBJECTIVE     /78   Pulse 58   Temp 97.7 °F (36.5 °C)   Resp 18   Ht 172.7 cm (68\")   Wt 98.2 kg (216 lb 9.6 oz)   SpO2 91%   BMI 32.93 kg/m²       Flow (L/min): 2    Flowsheet Rows      First Filed Value   Admission Height  175.3 cm (69\") Documented at 08/26/2020 1304   Admission Weight  95.3 kg (210 lb) Documented at 08/26/2020 1304        Intake & Output (last day)       08/28 0701 - 08/29 0700 08/29 0701 - 08/30 0700    P.O. 480     I.V. (mL/kg) 1594.8 (16.2) 160 (1.6)    IV Piggyback 400 100    Total Intake(mL/kg) 2474.8 (25.2) 260 (2.6)    Urine (mL/kg/hr) 4150 (1.8) 1175 (1.1)    Total Output 4150 1175    Net -1675.2 -915                Exam:  General Exam:  Middle-aged white male " who appears older than stated age.  NAD  HEENT: Pupils equal and reactive. Nose and throat clear.  Neck:                          Supple, no JVD, thyromegaly, or adenopathy  Lungs: Bilateral wheezing  Cardiovascular: RRR without murmurs or gallops.  HR 60 bpm  Abdomen: Soft nontender without organomegaly or masses.   and rectal: Deferred.  Extremities: No cyanosis or clubbing and only trace lower extremity edema.  Neurologic:                 Symmetric strength. No focal deficits.  Speech intact    Chest X-Ray: No film today.  Last film from the 26 was normal      Results from last 7 days   Lab Units 08/29/20  0552 08/28/20  0457 08/27/20  0652   WBC 10*3/mm3 16.26* 15.39* 12.57*   HEMOGLOBIN g/dL 12.8* 13.6 13.6   HEMATOCRIT % 38.6 40.7 40.9   PLATELETS 10*3/mm3 195 195 199     Results from last 7 days   Lab Units 08/29/20  0552 08/28/20  0457   SODIUM mmol/L 135* 140   POTASSIUM mmol/L 4.1 4.6   CHLORIDE mmol/L 99 104   CO2 mmol/L 28.0 27.0   BUN mg/dL 12 12   CREATININE mg/dL 0.84 0.79   GLUCOSE mg/dL 122* 129*   CALCIUM mg/dL 8.8 9.1         Results from last 7 days   Lab Units 08/26/20  1557   ALK PHOS U/L 82   BILIRUBIN mg/dL 0.4   ALT (SGPT) U/L 26   AST (SGOT) U/L 29       No results found for: SEDRATE  No results found for: BNP  Lab Results   Component Value Date    TROPONINT <0.010 08/26/2020     No results found for: TSH  No results found for: LACTATE  No results found for: CORTISOL      I reviewed the patient's results, images and medication.    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  No medical problems other than some mild wheezing for which she will receive some neb treatments.  Neurosurgery indicates he can be transferred to the floor.      PLAN     1.  Duo nebs 4 times  daily and up to every 4 hours as needed  2.  Smoking cessation  3.  Transfer to telemetry as per neurosurgery.  Will asked the hospitalists to assume attending role  4.  Follow-up labs and chest x-ray in a.m.    Plan of care and goals reviewed with mulitdisciplinary team at daily rounds.    I discussed the patient's findings and my recommendations with patient and nursing staff    Time spent Critical care 20 min (It does not include procedure time).    Electronically signed by Joselo Wooten MD, 08/29/20, 5:34 PM.   Pulmonary / Critical care medicine       Electronically signed by Joselo Wooten MD at 08/30/20 0005       Consult Notes (last 72 hours) (Notes from 08/29/20 1106 through 09/01/20 1106)    No notes of this type exist for this encounter.            Discharge Summary      Gilbert Harrell MD at 08/31/20 0702            Date of Discharge:  8/31/2020    Feliberto Moore MD    Discharge Diagnosis: Left temporal lobe glioma  Procedures Performed  Procedure(s):  CRANIOTOMY FOR TUMOR LEFT       Summary of Hospitalization: This is a 55-year-old male who presented to the ED having 2 seizures.  He had no symptoms prior to that time.  MRI showed the presence of a large cystic tumor located in the left temporal lobe manifesting as slight dysphasia.  He had no hemiparesis.    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    He is discharged on a progressively weaning dose of dexamethasone.  He will be follow-up in the [place] neurosurgery clinic.  It is anticipated that he will require radiation and chemotherapy which can be arranged at [facility].        Condition on Discharge: Satisfactory  Discharge Disposition  Home or Self Care    Discharge Medications     Discharge Medications      New Medications      Instructions Start Date   dexamethasone 4 MG tablet  Commonly known as:  DECADRON    4 mg, Oral, 3 Times Daily      docusate sodium 100 MG capsule   100 mg, Oral, 2 Times Daily PRN      levETIRAcetam 500 MG tablet  Commonly known as:  KEPPRA   500 mg, Oral, Every 12 Hours Scheduled      oxyCODONE-acetaminophen 7.5-325 MG per tablet  Commonly known as:  PERCOCET   1 tablet, Oral, Every 4 Hours PRN         Continue These Medications      Instructions Start Date   aspirin 81 MG chewable tablet   81 mg, Oral, Daily      atenolol 50 MG tablet  Commonly known as:  TENORMIN   50 mg, Oral, Daily      cetirizine 10 MG tablet  Commonly known as:  zyrTEC   10 mg, Oral, Daily      losartan 50 MG tablet  Commonly known as:  COZAAR   50 mg, Oral, Daily      lovastatin 20 MG tablet  Commonly known as:  MEVACOR   20 mg, Oral, 2 times daily               Follow-up Appointments  No future appointments.  Additional Instructions for the Follow-ups that You Need to Schedule     Discharge Follow-up with Specialty: neurosurgery   As directed      Specialty:  neurosurgery    Follow Up Details:  call Dr. Harrell Thursday at [facility] [phone], clinic         Referral to Speech Therapy   As directed      [facility] speech therapy at [facility]. thanks    Order Comments:  [facility] speech therapy at [facility]. thanks                   Gilbert Harrell MD  08/31/20  07:02              Electronically signed by Gilbert Harrell MD at 08/31/20 0704

## 2020-09-02 ENCOUNTER — READMISSION MANAGEMENT (OUTPATIENT)
Dept: CALL CENTER | Facility: HOSPITAL | Age: 55
End: 2020-09-02

## 2020-09-02 NOTE — OUTREACH NOTE
General Surgery Week 1 Survey      Responses   [facility] patient discharged from?  [place]   Does the patient have one of the following disease processes/diagnoses(primary or secondary)?  General Surgery   Is there a successful TCM telephone encounter documented?  No   Week 1 attempt successful?  Yes   Call start time  1718   Call end time  1721   Discharge diagnosis  Temporal glioma, s/p Craniotomy for tumor removal, HTN, Dyslipidemia, alcohol use   Person spoke with today (if not patient) and relationship  spouse   Meds reviewed with patient/caregiver?  Yes   Is the patient having any side effects they believe may be caused by any medication additions or changes?  No   Does the patient have all medications related to this admission filled (includes all antibiotics, pain medications, etc.)  Yes   Is the patient taking all medications as directed (includes completed medication regime)?  Yes   Does the patient have a follow up appointment scheduled with their surgeon?  Yes   Has the patient kept scheduled appointments due by today?  N/A   Comments  Spouse is wating to hear from them.  Patient will call Dr. Harrell tomorrow.   Psychosocial issues?  No   Did the patient receive a copy of their discharge instructions?  Yes   Nursing interventions  Reviewed instructions with patient   What is the patient's perception of their health status since discharge?  Improving   Is the patient/caregiver able to teach back signs and symptoms of incisional infection?  Increased redness, swelling or pain at the incisonal site, Increased drainage or bleeding, Incisional warmth, Pus or odor from incision, Fever   Is the patient/caregiver able to teach back the hierarchy of who to call/visit for symptoms/problems? PCP, Specialist, Home health nurse, Urgent Care, ED, 911  Yes   Week 1 call completed?  Yes   Wrap up additional comments  Spouse states patient is doing well.  Incision looks good.  She denies any needs during this  Prescription of traMADol (ULTRAM) 50 MG tablet was faxed to pharmacy.  Prescription of scopolamine (TRANSDERM) 72 hr patch was faxed to pharmacy.   nevaeh.           Kami García RN

## 2020-09-08 ENCOUNTER — TELEPHONE (OUTPATIENT)
Dept: NEUROSURGERY | Facility: CLINIC | Age: 55
End: 2020-09-08

## 2020-09-08 NOTE — TELEPHONE ENCOUNTER
Patient's wife, Jesús Reardon, called in regards to the pathology report for the brain tumor. She hoping for a return call from Dr. Harrell when able.     Thanks!     [phone] or [phone]

## 2020-09-09 ENCOUNTER — OFFICE VISIT (OUTPATIENT)
Dept: PHYSICAL THERAPY | Facility: CLINIC | Age: 55
End: 2020-09-09

## 2020-09-09 DIAGNOSIS — C71.9 GLIOMA OF BRAIN (HCC): Primary | ICD-10-CM

## 2020-09-09 DIAGNOSIS — R41.841 COGNITIVE COMMUNICATION DEFICIT: ICD-10-CM

## 2020-09-09 DIAGNOSIS — R47.01 EXPRESSIVE APHASIA: ICD-10-CM

## 2020-09-09 PROCEDURE — 92523 SPEECH SOUND LANG COMPREHEN: CPT | Performed by: SPEECH-LANGUAGE PATHOLOGIST

## 2020-09-09 NOTE — PROGRESS NOTES
Continued Stay Note  [facility]     Patient Name: Kennedy Reardon  MRN: [id number]  Today's Date: 9/9/2020    Admit Date: 8/26/2020    Discharge Plan     Row Name 09/09/20 1324       Plan    Plan Comments  9/9- Late entry: Received call from Dr Harrell who stated patient still had no arrangements for outpatient speech therapy. I spoke with outpatient speech at [facility], apparently they had attempted to reach wife to schedule appt  but were unable to reach her. I contacted her at [phone] ( [facility] had incorrect phone number). Arrangements made for outpt speech therapy to start today at 11am at the outpt speech dept ( [address] [facility], phone [phone]). Wife in agreement with plan. She will contact his local PCP to try to arrange suture removal  Addendum: discussed with Dr Harrell, he will plan to remove sutures in his clinic at [facility] on thurs 9/17, discussed with wife by phone,she verbalizes understanding        Discharge Codes    No documentation.       Expected Discharge Date and Time     Expected Discharge Date Expected Discharge Time    Aug 31, 2020             Sonja C Kellerman, RN

## 2020-09-17 ENCOUNTER — OFFICE VISIT (OUTPATIENT)
Dept: NEUROSURGERY | Facility: CLINIC | Age: 55
End: 2020-09-17

## 2020-09-17 VITALS
BODY MASS INDEX: 33.04 KG/M2 | DIASTOLIC BLOOD PRESSURE: 70 MMHG | WEIGHT: 218 LBS | SYSTOLIC BLOOD PRESSURE: 134 MMHG | TEMPERATURE: 97.3 F | HEIGHT: 68 IN

## 2020-09-17 DIAGNOSIS — C71.9 GLIOMA (HCC): Primary | ICD-10-CM

## 2020-09-17 PROCEDURE — 99024 POSTOP FOLLOW-UP VISIT: CPT | Performed by: NEUROLOGICAL SURGERY

## 2020-09-17 NOTE — PROGRESS NOTES
Kennedy Reardon  1965  [id number]                        CHIEF COMPLAINT: Status post resection left temporal glioma         MEDICAL HISTORY SINCE LAST ENCOUNTER: He is approaching 1 month subsequent resection of a tumor in the left temporal lobe manifesting as headache and speech dysfunction.  The histopathology and molecular pathology remain pending at this time.  He is done quite well, however.  He is on Keppra for seizures but no dexamethasone.  He reports today for suture removal.           Past Medical History:   Diagnosis Date   • Medical history reviewed with no changes    • Seizures (CMS/HCC)               Past Surgical History:   Procedure Laterality Date   • CRANIOTOMY FOR TUMOR Left 8/28/2020    Procedure: CRANIOTOMY FOR TUMOR LEFT;  Surgeon: Gilbert Harrell MD;  Location: [facility];  Service: Neurosurgery;  Laterality: Left;              Family History   Problem Relation Age of Onset   • Osteoarthritis Mother    • Rheum arthritis Mother    • Hypertension Mother    • Cancer Father    • Osteoporosis Sister    • Osteoarthritis Maternal Grandfather    • Rheum arthritis Maternal Grandfather    • Heart disease Paternal Grandmother    • Cancer Paternal Grandfather    • Heart disease Paternal Grandfather    • Diabetes Paternal Grandfather               Social History     Socioeconomic History   • Marital status:      Spouse name: Not on file   • Number of children: Not on file   • Years of education: Not on file   • Highest education level: Not on file   Tobacco Use   • Smoking status: Current Every Day Smoker   • Smokeless tobacco: Never Used   Substance and Sexual Activity   • Alcohol use: No            No Known Allergies           Current Outpatient Medications:   •  aspirin 81 MG chewable tablet, Chew 81 mg Daily., Disp: , Rfl:   •  atenolol (TENORMIN) 50 MG tablet, Take 50 mg by mouth Daily., Disp: , Rfl:   •  cetirizine (zyrTEC) 10 MG tablet, Take 10 mg by mouth Daily., Disp: , Rfl:   •   docusate sodium 100 MG capsule, Take 1capsule by mouth 2 (Two) Times a Day As Needed for Constipation., Disp: 30 each, Rfl: 2  •  levETIRAcetam (KEPPRA) 500 MG tablet, Take 1 tablet by mouth Every 12 (Twelve) Hours., Disp: 180 tablet, Rfl: 4  •  losartan (COZAAR) 50 MG tablet, Take 50 mg by mouth Daily., Disp: , Rfl:   •  lovastatin (MEVACOR) 20 MG tablet, Take 20 mg by mouth 2 (two) times a day., Disp: , Rfl:          Review of Systems   Constitutional: Negative for activity change, appetite change, chills, diaphoresis, fatigue, fever and unexpected weight change.   HENT: Negative for congestion, dental problem, drooling, ear discharge, ear pain, facial swelling, hearing loss, mouth sores, nosebleeds, postnasal drip, rhinorrhea, sinus pressure, sneezing, sore throat, tinnitus, trouble swallowing and voice change.    Eyes: Negative for photophobia, pain, discharge, redness, itching and visual disturbance.   Respiratory: Negative for apnea, cough, choking, chest tightness, shortness of breath, wheezing and stridor.    Cardiovascular: Negative for chest pain, palpitations and leg swelling.   Gastrointestinal: Negative for abdominal distention, abdominal pain, anal bleeding, blood in stool, constipation, diarrhea, nausea, rectal pain and vomiting.   Endocrine: Negative for cold intolerance, heat intolerance, polydipsia, polyphagia and polyuria.   Genitourinary: Negative for decreased urine volume, difficulty urinating, dysuria, enuresis, flank pain, frequency, genital sores, hematuria and urgency.   Musculoskeletal: Negative for arthralgias, back pain, gait problem, joint swelling, myalgias, neck pain and neck stiffness.   Skin: Negative for color change, pallor, rash and wound.   Allergic/Immunologic: Negative for environmental allergies, food allergies and immunocompromised state.   Neurological: Negative for dizziness, tremors, seizures, syncope, facial asymmetry, speech difficulty, weakness, light-headedness,  "numbness and headaches.   Hematological: Negative for adenopathy. Does not bruise/bleed easily.   Psychiatric/Behavioral: Negative for agitation, behavioral problems, confusion, decreased concentration, dysphoric mood, hallucinations, self-injury, sleep disturbance and suicidal ideas. The patient is not nervous/anxious and is not hyperactive.                Vitals:    09/17/20 0830   BP: 134/70   BP Location: Left arm   Patient Position: Sitting   Cuff Size: Adult   Temp: 97.3 °F (36.3 °C)   TempSrc: Infrared   Weight: 98.9 kg (218 lb)   Height: 172.7 cm (68\")               EXAMINATION: Wound is well-healed.  Mild dysphasia.  No weakness, sensory loss or reflex asymmetry.            MEDICAL DECISION MAKING: S/p resection of glioma           ASSESSMENT/DISPOSITION: We await the molecular studies before moving forward with chemotherapy is justifiable.  He will require radiation therapy.  I will inquire as to the ability to achieve this locally.  MRIs been ordered and I will return to see him in 2 weeks.              I APPRECIATE THE OPPORTUNITY OF THIS REFERRAL. PLEASE CALL IF ANY       QUESTIONS [phone]          "

## 2020-09-28 DIAGNOSIS — C71.9 GLIOMA (HCC): Primary | ICD-10-CM

## 2020-09-28 LAB
CYTO UR: NORMAL
LAB AP CASE REPORT: NORMAL
LAB AP CLINICAL INFORMATION: NORMAL
LAB AP DIAGNOSIS COMMENT: NORMAL
LAB AP SPECIAL STAINS: NORMAL
Lab: NORMAL
PATH REPORT.FINAL DX SPEC: NORMAL
PATH REPORT.GROSS SPEC: NORMAL

## 2020-09-29 ENCOUNTER — TELEPHONE (OUTPATIENT)
Dept: NEUROSURGERY | Facility: CLINIC | Age: 55
End: 2020-09-29

## 2020-09-29 NOTE — TELEPHONE ENCOUNTER
NELDA @ [facility] (DR GROSSMAN) IS CALLING BECAUSE THEY DO NOT HAVE RADIATION AT THIS TIME AND DO NOT EXPECT TO HAVE IT UNTIL AFTER THE FIRST OF THE YEAR.  DR GROSSMAN SAID THIS PATIENT NEEDS RADIATION ASAP.  PLEASE REFER TO SOMEONE THAT HAS IT.  [facility] [phone]

## 2020-09-29 NOTE — TELEPHONE ENCOUNTER
Per Dr. Harrell, he is already aware of this and states he has spoken with radiation oncology already.  This matter is already being handled.

## 2020-09-30 ENCOUNTER — SPECIALTY PHARMACY (OUTPATIENT)
Dept: PHARMACY | Facility: HOSPITAL | Age: 55
End: 2020-09-30

## 2020-09-30 ENCOUNTER — CONSULT (OUTPATIENT)
Dept: ONCOLOGY | Facility: CLINIC | Age: 55
End: 2020-09-30

## 2020-09-30 ENCOUNTER — DOCUMENTATION (OUTPATIENT)
Dept: ONCOLOGY | Facility: HOSPITAL | Age: 55
End: 2020-09-30

## 2020-09-30 ENCOUNTER — TREATMENT (OUTPATIENT)
Dept: PHYSICAL THERAPY | Facility: CLINIC | Age: 55
End: 2020-09-30

## 2020-09-30 VITALS
HEIGHT: 68 IN | HEART RATE: 76 BPM | RESPIRATION RATE: 18 BRPM | DIASTOLIC BLOOD PRESSURE: 92 MMHG | BODY MASS INDEX: 33.25 KG/M2 | OXYGEN SATURATION: 97 % | WEIGHT: 219.4 LBS | TEMPERATURE: 97.8 F | SYSTOLIC BLOOD PRESSURE: 143 MMHG

## 2020-09-30 DIAGNOSIS — G93.89 BRAIN MASS: Primary | ICD-10-CM

## 2020-09-30 DIAGNOSIS — C71.9 GLIOMA OF BRAIN (HCC): Primary | ICD-10-CM

## 2020-09-30 DIAGNOSIS — R47.01 EXPRESSIVE APHASIA: ICD-10-CM

## 2020-09-30 DIAGNOSIS — R41.841 COGNITIVE COMMUNICATION DEFICIT: ICD-10-CM

## 2020-09-30 LAB
ALBUMIN SERPL-MCNC: 4.41 G/DL (ref 3.5–5.2)
ALBUMIN/GLOB SERPL: 1.4 G/DL
ALP SERPL-CCNC: 127 U/L (ref 39–117)
ALT SERPL W P-5'-P-CCNC: 45 U/L (ref 1–41)
ANION GAP SERPL CALCULATED.3IONS-SCNC: 11.7 MMOL/L (ref 5–15)
AST SERPL-CCNC: 22 U/L (ref 1–40)
BASOPHILS # BLD AUTO: 0.06 10*3/MM3 (ref 0–0.2)
BASOPHILS NFR BLD AUTO: 0.6 % (ref 0–1.5)
BILIRUB SERPL-MCNC: 0.3 MG/DL (ref 0–1.2)
BUN SERPL-MCNC: 7 MG/DL (ref 6–20)
BUN/CREAT SERPL: 8.2 (ref 7–25)
CALCIUM SPEC-SCNC: 10.2 MG/DL (ref 8.6–10.5)
CHLORIDE SERPL-SCNC: 100 MMOL/L (ref 98–107)
CO2 SERPL-SCNC: 26.3 MMOL/L (ref 22–29)
CREAT SERPL-MCNC: 0.85 MG/DL (ref 0.76–1.27)
DEPRECATED RDW RBC AUTO: 42.1 FL (ref 37–54)
EOSINOPHIL # BLD AUTO: 0.37 10*3/MM3 (ref 0–0.4)
EOSINOPHIL NFR BLD AUTO: 3.5 % (ref 0.3–6.2)
ERYTHROCYTE [DISTWIDTH] IN BLOOD BY AUTOMATED COUNT: 12.3 % (ref 12.3–15.4)
GFR SERPL CREATININE-BSD FRML MDRD: 94 ML/MIN/1.73
GLOBULIN UR ELPH-MCNC: 3.2 GM/DL
GLUCOSE SERPL-MCNC: 116 MG/DL (ref 65–99)
HCT VFR BLD AUTO: 46.2 % (ref 37.5–51)
HGB BLD-MCNC: 15.2 G/DL (ref 13–17.7)
IMM GRANULOCYTES # BLD AUTO: 0.03 10*3/MM3 (ref 0–0.05)
IMM GRANULOCYTES NFR BLD AUTO: 0.3 % (ref 0–0.5)
LYMPHOCYTES # BLD AUTO: 3.12 10*3/MM3 (ref 0.7–3.1)
LYMPHOCYTES NFR BLD AUTO: 29.7 % (ref 19.6–45.3)
MCH RBC QN AUTO: 30.8 PG (ref 26.6–33)
MCHC RBC AUTO-ENTMCNC: 32.9 G/DL (ref 31.5–35.7)
MCV RBC AUTO: 93.5 FL (ref 79–97)
MONOCYTES # BLD AUTO: 1.19 10*3/MM3 (ref 0.1–0.9)
MONOCYTES NFR BLD AUTO: 11.3 % (ref 5–12)
NEUTROPHILS NFR BLD AUTO: 5.75 10*3/MM3 (ref 1.7–7)
NEUTROPHILS NFR BLD AUTO: 54.6 % (ref 42.7–76)
NRBC BLD AUTO-RTO: 0 /100 WBC (ref 0–0.2)
PLATELET # BLD AUTO: 253 10*3/MM3 (ref 140–450)
PMV BLD AUTO: 9.6 FL (ref 6–12)
POTASSIUM SERPL-SCNC: 4.9 MMOL/L (ref 3.5–5.2)
PROT SERPL-MCNC: 7.6 G/DL (ref 6–8.5)
RBC # BLD AUTO: 4.94 10*6/MM3 (ref 4.14–5.8)
SODIUM SERPL-SCNC: 138 MMOL/L (ref 136–145)
WBC # BLD AUTO: 10.52 10*3/MM3 (ref 3.4–10.8)

## 2020-09-30 PROCEDURE — 36415 COLL VENOUS BLD VENIPUNCTURE: CPT | Performed by: INTERNAL MEDICINE

## 2020-09-30 PROCEDURE — 92507 TX SP LANG VOICE COMM INDIV: CPT | Performed by: SPEECH-LANGUAGE PATHOLOGIST

## 2020-09-30 PROCEDURE — 80053 COMPREHEN METABOLIC PANEL: CPT | Performed by: INTERNAL MEDICINE

## 2020-09-30 PROCEDURE — 85025 COMPLETE CBC W/AUTO DIFF WBC: CPT | Performed by: INTERNAL MEDICINE

## 2020-09-30 PROCEDURE — 99215 OFFICE O/P EST HI 40 MIN: CPT | Performed by: INTERNAL MEDICINE

## 2020-09-30 RX ORDER — TEMOZOLOMIDE 140 MG/1
140 CAPSULE ORAL DAILY
Qty: 42 CAPSULE | Refills: 0 | Status: SHIPPED | OUTPATIENT
Start: 2020-09-30 | End: 2020-12-29

## 2020-09-30 RX ORDER — TEMOZOLOMIDE 20 MG/1
20 CAPSULE ORAL DAILY
Qty: 42 CAPSULE | Refills: 0 | Status: SHIPPED | OUTPATIENT
Start: 2020-09-30 | End: 2020-12-29

## 2020-09-30 RX ORDER — PROCHLORPERAZINE MALEATE 10 MG
10 TABLET ORAL EVERY 8 HOURS PRN
Qty: 90 TABLET | Refills: 5 | Status: SHIPPED | OUTPATIENT
Start: 2020-09-30 | End: 2022-01-01

## 2020-09-30 RX ORDER — ONDANSETRON 4 MG/1
4 TABLET, FILM COATED ORAL EVERY 8 HOURS PRN
Qty: 90 TABLET | Refills: 5 | Status: SHIPPED | OUTPATIENT
Start: 2020-09-30 | End: 2021-06-07

## 2020-09-30 RX ORDER — SULFAMETHOXAZOLE AND TRIMETHOPRIM 800; 160 MG/1; MG/1
1 TABLET ORAL 3 TIMES WEEKLY
Qty: 12 TABLET | Refills: 7 | Status: SHIPPED | OUTPATIENT
Start: 2020-09-30 | End: 2021-09-16

## 2020-09-30 NOTE — PROGRESS NOTES
The patient and I discussed the results of his PHQ depression screening.    PHQ-9 Total Score:  3    Patient seen in office visit this date.    SS will follow as needed.

## 2020-09-30 NOTE — PROGRESS NOTES
Name:  Kennedy Reardon  :  1965  Date:  2020     REFERRING PHYSICIAN  Gilbert Harrell MD    PRIMARY CARE PHYSICIAN  Feliberto Moore MD    REASON FOR CONSULTATION  1. 5 X 3.8cm L temporal glioma s/p crani and excision 2020      CHIEF COMPLAINT  Photosensitivity.    Dear Dr. Harrell,    HISTORY OF PRESENT ILLNESS:   Thank you for referring Mr. Reardon to our medical oncology clinic. As you are aware, he is a pleasant, 55 y.o., white male with a history of minimal medical problems who was in his usual state of health until late 2020 when he suffered a couple of seizures a few days apart. He presented to our ED following the second episode and was med-flighted to [facility] due to concerns that he was suffering a stroke. Ultimately, an MRI of the brain identified a cystic mass in the left temporoparietal region; and you took him to the OR on 2020. The final, surgical pathology was consistent with glioblastoma multiforme. He has recovered overall well from his craniotomy, and he is now referred to our clinic for further evaluation and management closer to his home in [place], [place].     INTERIM HISTORY:  Mr. Harrell presents to clinic today for initial consultation accompanied by his wife. They confirm the above history. He has been recovering from late August's craniotomy well. Other than significant sensitivity to light (he has to wear sunglasses all the time), which seems to have gotten worse since his surgery, he has no specific complaints today (including headaches or nausea/vomiting). He is scheduled to see radiation oncology in [place] this Friday (10/02/2020).    Past Medical History:   Diagnosis Date   • Medical history reviewed with no changes    • Seizures (CMS/HCC)        Past Surgical History:   Procedure Laterality Date   • CRANIOTOMY FOR TUMOR Left 2020    Procedure: CRANIOTOMY FOR TUMOR LEFT;  Surgeon: Gilbert Harrell MD;  [facility];   Service: Neurosurgery;  Laterality: Left;       Social History     Socioeconomic History   • Marital status:      Spouse name: Not on file   • Number of children: Not on file   • Years of education: Not on file   • Highest education level: Not on file   Tobacco Use   • Smoking status: Current Every Day Smoker   • Smokeless tobacco: Never Used   Substance and Sexual Activity   • Alcohol use: No       Family History   Problem Relation Age of Onset   • Osteoarthritis Mother    • Rheum arthritis Mother    • Hypertension Mother    • Cancer Father    • Osteoporosis Sister    • Osteoarthritis Maternal Grandfather    • Rheum arthritis Maternal Grandfather    • Heart disease Paternal Grandmother    • Cancer Paternal Grandfather    • Heart disease Paternal Grandfather    • Diabetes Paternal Grandfather        No Known Allergies    Current Outpatient Medications   Medication Sig Dispense Refill   • aspirin 81 MG chewable tablet Chew 81 mg Daily.     • atenolol (TENORMIN) 50 MG tablet Take 50 mg by mouth Daily.     • cetirizine (zyrTEC) 10 MG tablet Take 10 mg by mouth Daily.     • docusate sodium 100 MG capsule Take 1capsule by mouth 2 (Two) Times a Day As Needed for Constipation. 30 each 2   • levETIRAcetam (KEPPRA) 500 MG tablet Take 1 tablet by mouth Every 12 (Twelve) Hours. 180 tablet 4   • losartan (COZAAR) 50 MG tablet Take 50 mg by mouth Daily.     • lovastatin (MEVACOR) 20 MG tablet Take 20 mg by mouth 2 (two) times a day.     • ondansetron (ZOFRAN) 4 MG tablet Take 1 tablet by mouth Every 8 (Eight) Hours As Needed for Nausea or Vomiting. 90 tablet 5   • prochlorperazine (COMPAZINE) 10 MG tablet Take 1 tablet by mouth Every 8 (Eight) Hours As Needed for Nausea or Vomiting. 90 tablet 5     No current facility-administered medications for this visit.      REVIEW OF SYSTEMS  CONSTITUTIONAL:  No fever, chills or night sweats.  EYES:  No blurry vision, diplopia or other vision changes. Photosensitivity, especially  "since last month's craniotomy and resection.  ENT:  No hearing loss, nosebleeds or sore throat.  CARDIOVASCULAR:  No palpitations, arrhythmia, syncopal episodes or edema.  PULMONARY:  No hemoptysis, wheezing, chronic cough or shortness of breath.  GASTROINTESTINAL:  No nausea or vomiting.  No constipation or diarrhea. No abdominal pain.  GENITOURINARY:  No hematuria, kidney stones or frequent urination.  MUSCULOSKELETAL:  No joint or back pains.  INTEGUMENTARY: No rashes or pruritus.  ENDOCRINE:  No excessive thirst or hot flashes.  HEMATOLOGIC:  No history of free bleeding, spontaneous bleeding or clotting.  IMMUNOLOGIC:  No allergies or frequent infections.  NEUROLOGIC: As per the HPI above.  PSYCHIATRIC:  No anxiety or depression.    PHYSICAL EXAMINATION  /92   Pulse 76   Temp 97.8 °F (36.6 °C) (Temporal)   Resp 18   Ht 172.7 cm (68\")   Wt 99.5 kg (219 lb 6.4 oz)   SpO2 97%   BMI 33.36 kg/m²     Pain Score:  Pain Score    09/30/20 0937   PainSc: 0-No pain       PHQ-Score Total:  PHQ-9 Total Score: 3    GENERAL:  A well-developed, well-nourished, white male in no acute distress.  HEENT:  Deferred due to patient's photosensitivity. Wearing sunglasses.  CARDIOVASCULAR:  Regular rate and rhythm.  No murmurs, gallops or rubs.  LUNGS:  Clear to auscultation bilaterally.  ABDOMEN:  Soft, nontender, nondistended with positive bowel sounds.  EXTREMITIES:  No clubbing, cyanosis or edema bilaterally.  SKIN:  No rashes or petechiae.  NEURO:  No focal deficits.  PSYCH:  Alert and oriented x3.    LABORATORY  Lab Results   Component Value Date    WBC 10.52 09/30/2020    HGB 15.2 09/30/2020    HCT 46.2 09/30/2020    MCV 93.5 09/30/2020     09/30/2020    NEUTROABS 5.75 09/30/2020       Lab Results   Component Value Date     09/30/2020    K 4.9 09/30/2020     09/30/2020    CO2 26.3 09/30/2020    BUN 7 09/30/2020    CREATININE 0.85 09/30/2020    GLUCOSE 116 (H) 09/30/2020    CALCIUM 10.2 09/30/2020 "    AST 22 09/30/2020    ALT 45 (H) 09/30/2020    ALKPHOS 127 (H) 09/30/2020    BILITOT 0.3 09/30/2020    PROTEINTOT 7.6 09/30/2020    ALBUMIN 4.41 09/30/2020     IMAGING  MRI brain with contrast (08/27/2020):  Impression: Large cystic mass identified in the left temporoparietal region with surgical markers in place for surgical planning. The largest axial dimension of the nodular component is 2.5 x 1.6 cm.    MRI brain with and without contrast (08/31/2020):  Impression: Postsurgical changes left temporal craniotomy and resection with expected early postoperative changes including dural enhancement mildly prominent along the left inferolateral margin of the resection site with attention on followup otherwise, no residual soft tissue nodular enhancement or mass-occupying focus of enhancement. No midline shift or hydrocephalus.    PATHOLOGY  Temporal lobe mass (08/28/2020):  Glioblastoma multiforme. IDH1/IDH2 mutation not detected. 1p/19q co-deletion not detected. MGMT gene promotor methylation: Detected.    IMPRESSION AND PLAN  Mr. Reardon is a 55 y.o., white male with:  Glioblastoma multiforme: Diagnosed in late August 2020 following a couple of seizure events a few days apart and status post craniotomy with resection of a left, temporoparietal region, cystic mass on 08/27/2020. The final surgical pathology results are summarized above. In the ~month since his surgery, he has been recovering well. I had a long discussion with the patient and his wife in clinic today regarding this diagnosis and its prognosis, reiterating much of what they were previously told by [facility] neurosurgery. They are aware that his diagnosis is, unfortunately, ultimately very poor; however, this malignancy is potentially treatable and sustained remissions are possible. Adjuvant, concomitant chemotherapy and radiation is now recommended. He is scheduled to see [facility] radiation oncology this Friday (10/02/2020) in initial  consultation. A Rx for temozolomide (75 mg/m2 daily with XRT; patient dose: 160 mg) was provided today. We will arrange to check CBCs and CMPs on a qweekly basis (at [facility]) throughout his adjuvant treatment and see him back in our clinic in ~two (2) months (early December). At that time, depending on his clinical course, continued, adjuvant temozolomide alone (150 mg/m2 days 1-5 of 28-day cycles) will likely be further discussed and offered. The patient and his wife were in agreement with these plans.    It is a pleasure to participate in Mr. Reardon's care. Please do not hesitate to call with any questions or concerns that you may have.    A total of 60 minutes were spent coordinating this patient’s care in clinic today; more than 50% of this time was face-to-face with the patient and his wife, reviewing his medical history, discussing the diagnosis and its prognosis and counseling on the current treatment and followup plan. All questions were answered to their satisfaction.    FOLLOW UP  With [facility] radiation oncology this Friday (10/02/2020) for initial consultation, as planned. With neurosurgery as previously planned. Begin adjuvant, concomitant, daily temozolomide and radiation as soon as possible. Return to our clinic in ~2 months, once XRT is completed. Qweekly CBCs and CMPs between now and then.            This document was electronically signed by KEON Casey MD September 30, 2020 11:26 EDT      CC: MD Zena Talley MD James W. Killian, MD

## 2020-09-30 NOTE — PROGRESS NOTES
Outpatient Speech Language Pathology   Adult Speech Language Cognitive Treatment Note       Patient Name: Kennedy Reardon  : 1965  MRN: [id number]  Today's Date: 2020         Visit Date: 2020   Patient Active Problem List   Diagnosis   • Dyslipidemia   • Hypertension   • 5 X 3.8cm L temporal glioma s/p crani and excision 2020   • Witnessed sz    • Glioma (CMS/HCC)   • Current smoker   • Alcohol use   • Class 1 obesity in adult   • Bronchospasm          Visit Dx:    ICD-10-CM ICD-9-CM   1. Glioma of brain (CMS/HCC)  C71.9 191.9   2. Expressive aphasia  R47.01 784.3   3. Cognitive communication deficit  R41.841 799.52           Long Term Goals:  1. Pt will improve speech, language, cognitive skills for communicational abilities in all settings and contexts.   2. Pt will develop functional, cognitive-linguistic-based skills and utilize compensatory strategies to communicate wants and needs effectively to different conversational partners, maintain safety and participate socially in functional living environment      Short Term Goals:  1. Pt will improve word finding in conversation in 4/5 opp w/ min cues over 3 sessions.   *pt has no word finding deficits during today's entire session. He produces sentences, structure tasks, and conversations WFL independently.     2. Pt will respond to moderate oe questions in /5 opp w/ min-mod cues over 3 sessions.  *pt responds to moderate oe questions in 9/10 opp independently; he asks for repetition of questions x2    3. Pt will verbally respond to general questions w/ MLU of 5+ word length in 8/10 opp w/ min cues across three sessions  *pt responds w/ avg of 5+ responses during conversational topics and structured tasks w/ min cues and prompts for expansion    4. Pt will recall activities of daily living to improve short term memory in 4/5 opp w/ min cues across three sessions.   *pt recalls ADLS from STM events w/ 100% acc in 3/3 opp this session after  questioned from SLP    5. Pt will answer open-ended questions regarding situational/biographical/environmental information in 4/5 opp w/ min cues across three sessions.  *pt provides medical history and upcoming appts independently this session w/o delay. Improvement from initial evaluation    6. Pt will name/describe objects/pictures in 8/10 opp w/ min cues across three sessions  *pt names describes and compare/contrasts objects/ pictures in 9/10 opp w/ trace-min cues    7. Pt will demonstrate recall of functional information following a long-term delay or related to LT events in 4/5 opp w/ min cues across three sessions  *not directly addressed this session s/t focus on other goals        *goals to be modified/changed as warranted        Pt reports concerns for stuttering, however no dysfluencies noted during today's evaluation. ST to continue to monitor and will address if warranted.      SLP d/w pt recommendation and results w/ verbal understanding and agreement. D/w pt generalization tasks such as conversations, reading, functional skills/sequencing. He reports verbal understanding and agreement.     He reports he has appt w/ eye doctor scheduled for Tuesday Oct 6th at 11am.  He reports he has MRI scheduled for tomorrow and appt Friday in [place] w/ Dr Harrell w/ plans/tentative for chemo/radiation s/t brain tumor. He reports he may have to stay in [place] and reports he or his wife will call SLP w/ results to determine services.      Pt wears sunglasses during session; reports continued visual and light disturbances. He wears mask during session. Reports he had stitches from brain sx removed.     Thank you for allowing me to participate in the care of your patient-  Robbi Walker MA CCC-SLP                Sherry Walker MA,CCC-SLP  9/30/2020

## 2020-10-01 ENCOUNTER — HOSPITAL ENCOUNTER (OUTPATIENT)
Dept: MRI IMAGING | Facility: HOSPITAL | Age: 55
Discharge: HOME | End: 2020-10-01
Admitting: NEUROLOGICAL SURGERY

## 2020-10-01 ENCOUNTER — OFFICE VISIT (OUTPATIENT)
Dept: NEUROSURGERY | Facility: CLINIC | Age: 55
End: 2020-10-01

## 2020-10-01 VITALS
TEMPERATURE: 97.1 F | HEIGHT: 68 IN | DIASTOLIC BLOOD PRESSURE: 76 MMHG | WEIGHT: 217.2 LBS | BODY MASS INDEX: 32.92 KG/M2 | SYSTOLIC BLOOD PRESSURE: 144 MMHG

## 2020-10-01 DIAGNOSIS — C71.9 GLIOMA (HCC): Primary | ICD-10-CM

## 2020-10-01 PROCEDURE — A9575 INJ GADOTERATE MEGLUMI 0.1ML: HCPCS | Performed by: NEUROLOGICAL SURGERY

## 2020-10-01 PROCEDURE — 99024 POSTOP FOLLOW-UP VISIT: CPT | Performed by: NEUROLOGICAL SURGERY

## 2020-10-01 PROCEDURE — 70553 MRI BRAIN STEM W/O & W/DYE: CPT | Performed by: RADIOLOGY

## 2020-10-01 PROCEDURE — 25010000002 GADOTERATE MEGLUMINE 10 MMOL/20ML SOLUTION

## 2020-10-01 PROCEDURE — A9575 INJ GADOTERATE MEGLUMI 0.1ML: HCPCS

## 2020-10-01 PROCEDURE — 25010000002 GADOTERATE MEGLUMINE 10 MMOL/20ML SOLUTION: Performed by: NEUROLOGICAL SURGERY

## 2020-10-01 PROCEDURE — 70553 MRI BRAIN STEM W/O & W/DYE: CPT

## 2020-10-01 RX ORDER — GADOTERATE MEGLUMINE 376.9 MG/ML
20 INJECTION INTRAVENOUS
Status: COMPLETED | OUTPATIENT
Start: 2020-10-01 | End: 2020-10-01

## 2020-10-01 RX ADMIN — GADOTERATE MEGLUMINE 20 ML: 376.9 INJECTION, SOLUTION INTRAVENOUS at 15:00

## 2020-10-01 NOTE — PROGRESS NOTES
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  Reports today for follow-up visit and anticipation of moving forward with TMZ and radiation.  His genetic markers (MGMT/TERT) indicate that he should have a favorable response to TMZ.  He no longer takes steroids.  His speech she has shown steady improvement.]           Past Medical History:   Diagnosis Date   • Medical history reviewed with no changes    • Seizures (CMS/HCC)               Past Surgical History:   Procedure Laterality Date   • CRANIOTOMY FOR TUMOR Left 8/28/2020    Procedure: CRANIOTOMY FOR TUMOR LEFT;  Surgeon: Gilbert Harrell MD;  [facility]  Service: Neurosurgery;  Laterality: Left;              Family History   Problem Relation Age of Onset   • Osteoarthritis Mother    • Rheum arthritis Mother    • Hypertension Mother    • Cancer Father    • Osteoporosis Sister    • Osteoarthritis Maternal Grandfather    • Rheum arthritis Maternal Grandfather    • Heart disease Paternal Grandmother    • Cancer Paternal Grandfather    • Heart disease Paternal Grandfather    • Diabetes Paternal Grandfather               Social History     Socioeconomic History   • Marital status:      Spouse name: Not on file   • Number of children: Not on file   • Years of education: Not on file   • Highest education level: Not on file   Tobacco Use   • Smoking status: Current Every Day Smoker   • Smokeless tobacco: Never Used   Substance and Sexual Activity   • Alcohol use: No            No Known Allergies           Current Outpatient Medications:   •  aspirin 81 MG chewable tablet, Chew 81 mg Daily., Disp: , Rfl:   •  atenolol (TENORMIN) 50 MG tablet, Take 50 mg by mouth Daily., Disp: , Rfl:   •  cetirizine (zyrTEC) 10 MG tablet, Take 10 mg by mouth Daily., Disp: , Rfl:   •   docusate sodium 100 MG capsule, Take 1capsule by mouth 2 (Two) Times a Day As Needed for Constipation., Disp: 30 each, Rfl: 2  •  levETIRAcetam (KEPPRA) 500 MG tablet, Take 1 tablet by mouth Every 12 (Twelve) Hours., Disp: 180 tablet, Rfl: 4  •  losartan (COZAAR) 50 MG tablet, Take 50 mg by mouth Daily., Disp: , Rfl:   •  lovastatin (MEVACOR) 20 MG tablet, Take 20 mg by mouth 2 (two) times a day., Disp: , Rfl:   •  ondansetron (ZOFRAN) 4 MG tablet, Take 1 tablet by mouth Every 8 (Eight) Hours As Needed for Nausea or Vomiting., Disp: 90 tablet, Rfl: 5  •  prochlorperazine (COMPAZINE) 10 MG tablet, Take 1 tablet by mouth Every 8 (Eight) Hours As Needed for Nausea or Vomiting., Disp: 90 tablet, Rfl: 5  •  sulfamethoxazole-trimethoprim (BACTRIM DS,SEPTRA DS) 800-160 MG per tablet, Take 1 tablet by mouth 3 (Three) Times a Week. Take 1 tablet on Mondays, Wednesdays and Fridays., Disp: 12 tablet, Rfl: 7  •  temozolomide (Temodar) 140 MG chemo capsule, Take 1 capsule by mouth Daily. With 20 mg capsule for 42 days with radiation, Disp: 42 capsule, Rfl: 0  •  temozolomide (Temodar) 20 MG chemo capsule, Take 1 capsule by mouth Daily. with 140 mg capsule for 42 days with radiation, Disp: 42 capsule, Rfl: 0  No current facility-administered medications for this visit.          Review of Systems   Constitutional: Negative for activity change, appetite change, chills, diaphoresis, fatigue, fever and unexpected weight change.   HENT: Negative for congestion, dental problem, drooling, ear discharge, ear pain, facial swelling, hearing loss, mouth sores, nosebleeds, postnasal drip, rhinorrhea, sinus pressure, sneezing, sore throat, tinnitus, trouble swallowing and voice change.    Eyes: Negative for photophobia, pain, discharge, redness, itching and visual disturbance.   Respiratory: Negative for apnea, cough, choking, chest tightness, shortness of breath, wheezing and stridor.    Cardiovascular: Negative for chest pain, palpitations  "and leg swelling.   Gastrointestinal: Negative for abdominal distention, abdominal pain, anal bleeding, blood in stool, constipation, diarrhea, nausea, rectal pain and vomiting.   Endocrine: Negative for cold intolerance, heat intolerance, polydipsia, polyphagia and polyuria.   Genitourinary: Negative for decreased urine volume, difficulty urinating, dysuria, enuresis, flank pain, frequency, genital sores, hematuria and urgency.   Musculoskeletal: Negative for arthralgias, back pain, gait problem, joint swelling, myalgias, neck pain and neck stiffness.   Skin: Negative for color change, pallor, rash and wound.   Allergic/Immunologic: Negative for environmental allergies, food allergies and immunocompromised state.   Neurological: Negative for dizziness, tremors, seizures, syncope, facial asymmetry, speech difficulty, weakness, light-headedness, numbness and headaches.   Hematological: Negative for adenopathy. Does not bruise/bleed easily.   Psychiatric/Behavioral: Negative for agitation, behavioral problems, confusion, decreased concentration, dysphoric mood, hallucinations, self-injury, sleep disturbance and suicidal ideas. The patient is not nervous/anxious and is not hyperactive.                Vitals:    10/01/20 1554   BP: 144/76   BP Location: Right arm   Patient Position: Sitting   Cuff Size: Adult   Temp: 97.1 °F (36.2 °C)   TempSrc: Infrared   Weight: 98.5 kg (217 lb 3.2 oz)   Height: 172.7 cm (68\")               EXAMINATION: He is alert and oriented.  Mild speech dysfunction no weakness or sensory loss            MEDICAL DECISION MAKING: The MRI shows significant improvement.  He has postsurgical change as anticipated.           ASSESSMENT/DISPOSITION: He is to see radiation oncology in a.m. and initiate TMZ in concert with radiation.  He will return to see me in 9 weeks for repeat MRI and sequential follow-up.  Keppra should be continued as well.              I APPRECIATE THE OPPORTUNITY OF THIS REFERRAL. " PLEASE CALL IF ANY       QUESTIONS [phone]      Scribed for Gilbert Harrell MD by Belkis Burrows CMA. 10/1/2020 16:45 EDT   I have read and concur with the information provided by the scribe.  Gilbert Harrell MD

## 2020-10-01 NOTE — PATIENT INSTRUCTIONS
Call Dr. Harrell on a Monday or Tuesday (ask for Leann or Mariella) and leave a message.     Dr. Harrell will call you back at the end of the day as soon as he can.     [phone] Leann    [phone] Mariella Michelle

## 2020-10-01 NOTE — PROGRESS NOTES
Specialty Pharmacy Note      Name:  Kennedy Reardon  :  1965  Date: 2020       Past Medical History:   Diagnosis Date   • Medical history reviewed with no changes    • Seizures (CMS/HCC)        Past Surgical History:   Procedure Laterality Date   • CRANIOTOMY FOR TUMOR Left 2020    Procedure: CRANIOTOMY FOR TUMOR LEFT;  Surgeon: Gilbert Harrell MD;  Location: [facility];  Service: Neurosurgery;  Laterality: Left;       Social History     Socioeconomic History   • Marital status:      Spouse name: Not on file   • Number of children: Not on file   • Years of education: Not on file   • Highest education level: Not on file   Tobacco Use   • Smoking status: Current Every Day Smoker   • Smokeless tobacco: Never Used   Substance and Sexual Activity   • Alcohol use: No       Family History   Problem Relation Age of Onset   • Osteoarthritis Mother    • Rheum arthritis Mother    • Hypertension Mother    • Cancer Father    • Osteoporosis Sister    • Osteoarthritis Maternal Grandfather    • Rheum arthritis Maternal Grandfather    • Heart disease Paternal Grandmother    • Cancer Paternal Grandfather    • Heart disease Paternal Grandfather    • Diabetes Paternal Grandfather        No Known Allergies    Current Outpatient Medications   Medication Sig Dispense Refill   • aspirin 81 MG chewable tablet Chew 81 mg Daily.     • atenolol (TENORMIN) 50 MG tablet Take 50 mg by mouth Daily.     • cetirizine (zyrTEC) 10 MG tablet Take 10 mg by mouth Daily.     • docusate sodium 100 MG capsule Take 1capsule by mouth 2 (Two) Times a Day As Needed for Constipation. 30 each 2   • levETIRAcetam (KEPPRA) 500 MG tablet Take 1 tablet by mouth Every 12 (Twelve) Hours. 180 tablet 4   • losartan (COZAAR) 50 MG tablet Take 50 mg by mouth Daily.     • lovastatin (MEVACOR) 20 MG tablet Take 20 mg by mouth 2 (two) times a day.     • ondansetron (ZOFRAN) 4 MG tablet Take 1 tablet by mouth Every 8 (Eight) Hours As Needed for  Nausea or Vomiting. 90 tablet 5   • prochlorperazine (COMPAZINE) 10 MG tablet Take 1 tablet by mouth Every 8 (Eight) Hours As Needed for Nausea or Vomiting. 90 tablet 5   • sulfamethoxazole-trimethoprim (BACTRIM DS,SEPTRA DS) 800-160 MG per tablet Take 1 tablet by mouth 3 (Three) Times a Week. Take 1 tablet on Mondays, Wednesdays and Fridays. 12 tablet 7   • temozolomide (Temodar) 140 MG chemo capsule Take 1 capsule by mouth Daily. With 20 mg capsule for 42 days with radiation 42 capsule 0   • temozolomide (Temodar) 20 MG chemo capsule Take 1 capsule by mouth Daily. with 140 mg capsule for 42 days with radiation 42 capsule 0     No current facility-administered medications for this visit.          LABORATORY:    Lab Results   Component Value Date    WBC 10.52 09/30/2020    HGB 15.2 09/30/2020    HCT 46.2 09/30/2020    MCV 93.5 09/30/2020    RDW 12.3 09/30/2020     09/30/2020    NEUTRORELPCT 54.6 09/30/2020    LYMPHORELPCT 29.7 09/30/2020    MONORELPCT 11.3 09/30/2020    EOSRELPCT 3.5 09/30/2020    BASORELPCT 0.6 09/30/2020    NEUTROABS 5.75 09/30/2020    LYMPHSABS 3.12 (H) 09/30/2020       Lab Results   Component Value Date     09/30/2020    K 4.9 09/30/2020    CO2 26.3 09/30/2020     09/30/2020    BUN 7 09/30/2020    CREATININE 0.85 09/30/2020    GLUCOSE 116 (H) 09/30/2020    CALCIUM 10.2 09/30/2020    ALKPHOS 127 (H) 09/30/2020    AST 22 09/30/2020    ALT 45 (H) 09/30/2020    BILITOT 0.3 09/30/2020    ALBUMIN 4.41 09/30/2020    PROTEINTOT 7.6 09/30/2020    PHOS 4.1 08/30/2020       No results found for: LDH, URICACID     Imaging Results (Last 24 Hours)     ** No results found for the last 24 hours. **          ASSESSMENT/PLAN:    Patient Update Assessment (new medications, allergies, medical history): Assessed    Medication(s): Temodar 75 mg/m2 (160 mg) with XRT    Currently Taking Medication(s): New start when starting XRT    Effectiveness of Medication: N/A    Experiencing Side Effects:  N/A    Prior Authorization Status: Was obtained with insurance    Financial Assistance Status: None needed at this time    Any Issues Identified: None identified    Appropriate to Process Prescription(s): Yes, after MD consult, chart review, and patient discussion, it is appropriate to process at this time. For the 160 mg dose, patient will take 1 capsule of each 140 mg and 20 mg daily with XRT for 42 days. Patient was also prescribed Zofran/Compazine for nausea and Bactrim for PCP prophylaxis.    Counseling Offered: Yes, patient was given medication information sheet.  We (along with his wife) reviewed common/serious side effects, administration information, etc. Supportive medications also reviewed. They understood and had all questions answered. They know to contact pharmacy/clinic with any questions.    Next Specialty Pharmacy Visit: In ~ 4 weeks

## 2020-10-02 ENCOUNTER — HOSPITAL ENCOUNTER (OUTPATIENT)
Dept: RADIATION ONCOLOGY | Facility: HOSPITAL | Age: 55
Setting detail: RADIATION/ONCOLOGY SERIES
Discharge: HOME | End: 2020-10-02

## 2020-10-02 ENCOUNTER — OFFICE VISIT (OUTPATIENT)
Dept: RADIATION ONCOLOGY | Facility: HOSPITAL | Age: 55
End: 2020-10-02

## 2020-10-02 VITALS
BODY MASS INDEX: 32.96 KG/M2 | HEART RATE: 89 BPM | SYSTOLIC BLOOD PRESSURE: 145 MMHG | TEMPERATURE: 97.3 F | WEIGHT: 217.5 LBS | OXYGEN SATURATION: 95 % | HEIGHT: 68 IN | DIASTOLIC BLOOD PRESSURE: 79 MMHG | RESPIRATION RATE: 18 BRPM

## 2020-10-02 DIAGNOSIS — I10 ESSENTIAL HYPERTENSION: Chronic | ICD-10-CM

## 2020-10-02 DIAGNOSIS — E78.5 DYSLIPIDEMIA: Primary | Chronic | ICD-10-CM

## 2020-10-02 DIAGNOSIS — C71.9 GLIOMA (HCC): ICD-10-CM

## 2020-10-02 DIAGNOSIS — G93.89 BRAIN MASS: ICD-10-CM

## 2020-10-02 PROCEDURE — G0463 HOSPITAL OUTPT CLINIC VISIT: HCPCS

## 2020-10-02 NOTE — PROGRESS NOTES
CONSULTATION NOTE    NAME:      Kennedy Reardon  :                                                          1965  DATE OF CONSULTATION:                       10/02/20  REQUESTING PHYSICIAN:                   Gilbert Harrell MD  REASON FOR CONSULTATION:           Evaluation and further management of the patient's GBM status post resection at request of Dr. Casey with medical oncology in [place] specifically for radiation concurrent with Temodar.  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 male  from [facility] who developed seizures was taken to the ER where he had an MRI on 2020 that showed a cystic left temporal lobe mass measuring about 5 cm in size.  Patient did have some mild midline shift at that time.  The patient was then taken for surgical section by Dr. Harrell on 2020.  Patient had a postop MRI of the brain on 2020 that showed a good resection with expected postop changes.  Patient pathology was consistent with a GBM.  The patient was then allowed to heal adequately and saw Dr. Casey with medical oncology in [place] who plan for the patient to have chemotherapy with Temodar and recommended concurrent radiation.  The patient saw Dr. Harrell on 10-1-20 who feels that the patient is healed adequately to begin radiation treatments.  Patient had MRI of the brain at that time that redemonstrated the patient's resection cavity and 2.9 cm area of enhancement in the left temporal lobe.  Patient denies any additional seizures.  He reports he is compliant with Keppra.  Reports he has stopped taking steroids.  Patient denies any fevers, chills, nausea, diarrhea, vomiting.  Patient reports that he has increased photosensitivity and requires sunglasses even inside.  The patient reports that he has already received his Temodar from Dr. Casey's  office.  Therefore they have adequate transportation to make the daily trips for 6 weeks from [place] to here.    No Known Allergies    Social History     Tobacco Use   • Smoking status: Current Every Day Smoker     Packs/day: 0.50   • Smokeless tobacco: Never Used   Substance Use Topics   • Alcohol use: No     Comment: few beers every now and then   • Drug use: Not on file     Current Outpatient Medications:   •  aspirin 81 MG chewable tablet, Chew 81 mg Daily., Disp: , Rfl:   •  atenolol (TENORMIN) 50 MG tablet, Take 50 mg by mouth Daily., Disp: , Rfl:   •  cetirizine (zyrTEC) 10 MG tablet, Take 10 mg by mouth Daily., Disp: , Rfl:   •  docusate sodium 100 MG capsule, Take 1capsule by mouth 2 (Two) Times a Day As Needed for Constipation., Disp: 30 each, Rfl: 2  •  levETIRAcetam (KEPPRA) 500 MG tablet, Take 1 tablet by mouth Every 12 (Twelve) Hours., Disp: 180 tablet, Rfl: 4  •  losartan (COZAAR) 50 MG tablet, Take 50 mg by mouth Daily., Disp: , Rfl:   •  lovastatin (MEVACOR) 20 MG tablet, Take 20 mg by mouth 2 (two) times a day., Disp: , Rfl:   •  ondansetron (ZOFRAN) 4 MG tablet, Take 1 tablet by mouth Every 8 (Eight) Hours As Needed for Nausea or Vomiting., Disp: 90 tablet, Rfl: 5  •  prochlorperazine (COMPAZINE) 10 MG tablet, Take 1 tablet by mouth Every 8 (Eight) Hours As Needed for Nausea or Vomiting., Disp: 90 tablet, Rfl: 5  •  temozolomide (Temodar) 140 MG chemo capsule, Take 1 capsule by mouth Daily. With 20 mg capsule for 42 days with radiation, Disp: 42 capsule, Rfl: 0  •  temozolomide (Temodar) 20 MG chemo capsule, Take 1 capsule by mouth Daily. with 140 mg capsule for 42 days with radiation, Disp: 42 capsule, Rfl: 0  •  sulfamethoxazole-trimethoprim (BACTRIM DS,SEPTRA DS) 800-160 MG per tablet, Take 1 tablet by mouth 3 (Three) Times a Week. Take 1 tablet on Mondays, Wednesdays and Fridays., Disp: 12 tablet, Rfl: 7  No current facility-administered medications for this visit.       Past Medical History:  "  Diagnosis Date   • Brain cancer (CMS/HCC)    • Hyperlipidemia    • Hypertension    • Medical history reviewed with no changes    • Seizures (CMS/HCC)        family history includes Cancer in his father and paternal grandfather; Diabetes in his paternal grandfather; Heart disease in his paternal grandfather and paternal grandmother; Hypertension in his mother; Osteoarthritis in his maternal grandfather and mother; Osteoporosis in his sister; Rheum arthritis in his maternal grandfather and mother.     Past Surgical History:   Procedure Laterality Date   • CRANIOTOMY FOR TUMOR Left 8/28/2020    Procedure: CRANIOTOMY FOR TUMOR LEFT;  Surgeon: Gilbert Harrell MD;  [facility];  Service: Neurosurgery;  Laterality: Left;        Review of Systems   Respiratory: Positive for cough and wheezing.    Cardiovascular: Positive for palpitations.   Neurological: Positive for dizziness and light-headedness.    A full 14 point review of systems was performed and was negative except as noted in the HPI.       Objective   VITAL SIGNS:   Vitals:    10/02/20 1041   BP: 145/79   Pulse: 89   Resp: 18   Temp: 97.3 °F (36.3 °C)   TempSrc: Temporal   SpO2: 95%  Comment: RA   Weight: 98.7 kg (217 lb 8 oz)   Height: 172.7 cm (68\")   PainSc: 0-No pain        KPS       90%    Physical Exam  Constitutional:       General: He is not in acute distress.     Appearance: He is well-developed.   HENT:      Head: Normocephalic and atraumatic.   Eyes:      Conjunctiva/sclera: Conjunctivae normal.      Pupils: Pupils are equal, round, and reactive to light.   Neck:      Musculoskeletal: Normal range of motion.      Trachea: No tracheal deviation.   Cardiovascular:      Comments: Well-perfused  Noncyanotic  Pulmonary:      Effort: Pulmonary effort is normal. No respiratory distress.      Breath sounds: No wheezing.   Abdominal:      General: There is no distension.      Palpations: Abdomen is soft.   Musculoskeletal: Normal range of motion. "   Skin:     General: Skin is warm and dry.      Comments: Surgical incision on left temporal scalp appears well-healed   Neurological:      Mental Status: He is alert.      Cranial Nerves: No cranial nerve deficit.      Coordination: Coordination normal.   Psychiatric:         Behavior: Behavior normal.         Judgment: Judgment normal.          I have personally reviewed the patient's images and radiology reports and pathology reports listed below:  EXAMINATION: MRI BRAIN W WO CONTRAST-      CLINICAL INDICATION:  tumor; C71.9-Malignant neoplasm of brain,  unspecified      TECHNIQUE: The study was undertaken in the sagittal, axial, and coronal  planes utilizing spin echo, FLAIR, and diffusion weight sequences.  Examination was done both before and after 20 milliliters of intravenous  gadolinium contrast administration.      COMPARISON: NONE      FINDINGS: T2 signal abnormality surrounding a left temporal lobe mass  measuring approximately 1.8 cm x 2.9 cm that shows predominantly  peripheral enhancement. Some postsurgical changes noted overlying this  region, but no prior examination is available for comparison. Sellar,  suprasellar cistern, and cavernous sinuses appear unremarkable. The  internal carotid arteries demonstrate the expected flow voids.  Examination of the posterior fossa reveals normal configuration and  signal intensity of the mid brain, shaq, medulla, and cerebellum.  Inflammatory sinus disease noted.     IMPRESSION:  T2 signal abnormality surrounding a left temporal lobe mass  measuring approximately 1.8 cm x 2.9 cm that shows predominantly  peripheral enhancement. Some postsurgical changes noted overlying this  region, but no prior examination is available for comparison.     This report was finalized on 10/1/2020 3:22 PM by Dr. Goran Stiles MD.         EXAMINATION: MRI BRAIN W WO CONTRAST-08/26/2020:     INDICATION: Brain mass; G93.89-Other specified disorders of brain;  R56.9-Unspecified  convulsions; D72.829-Elevated white blood cell count,  unspecified, syncope, disorientation, brain mass.     TECHNIQUE: Multiple axial CT imaging was obtained of the head from the  skull base to the skull vertex with and without the administration of  Gadolinium contrast.     COMPARISON: NONE.     FINDINGS: There is a large cystic mass identified within the left  temporoparietal lobe. There is enhancement identified with mural wall  nodule identified. The largest axial dimension of the cystic mass is  approximately 4.9 x 4.0 cm. There is wall enhancement identified of the  cystic structure. The largest axial dimension of the nodular component  is 2.5 x 1.6 cm. No additional area of abnormal contrast enhancement is  identified. There is minimal midline shift identified to the right of  several millimeters. There is some effacement of the left lateral  ventricle. Minimal surrounding edema within the soft tissues. Findings  suggesting a low-grade cystic neoplasm. No additional area of metastasis  or abnormality is identified. No evidence of mesial temporal sclerosis,  however, there is some mass effect identified on the hippocampus on the  left. No abnormal extra-axial fluid collection is identified. Globes and  orbits are intact. The visualized paranasal sinuses are clear. The  mastoid air cells are patent. No cerebellopontine angle mass identified.  Pituitary and sella are unremarkable. Craniovertebral junction is  preserved. No cerebellopontine angle mass identified.     IMPRESSION:  Findings to suggest low-grade cystic neoplasm within the  left temporoparietal lobe. There is minimal midline shift and effacement  of the left lateral ventricle. Minimal surrounding edema. There is  extensive mass effect. No other lesion or area of abnormal contrast  enhancement is identified.      D:  08/26/2020  E:  08/26/2020     This report was finalized on 8/27/2020 4:53 PM by Dr. Rema Fletcher MD.      Imaging    MRI Brain  "With & Without Contrast (Order: [id number]) - 8/26/2020      Tissue Pathology Exam: [id number]  Order: [id number]  Status:  Final result   Visible to patient:  No (not released)   Next appt:  10/07/2020 at 11:00 AM in Physical Therapy (Sherry Walker MA,CCC-SLP)   Dx:  Brain mass  Specimen Information: A: Brain; Tissue    C: Brain; Aspirate        Component    Case Report   Surgical Pathology Report                         Case: [id number]                                   Authorizing Provider:  Gilbert Harrell MD      Collected:           08/28/2020 08:00 AM           Ordering Location:     [facility]   Received:            08/28/2020 08:16 AM                                  OR                                                                            Pathologist:           He Babin MD                                                            Intraop:               He Babin MD                                                            Specimens:   1) - Brain, Brain tumor left for frozen                                                              2) - Brain, Brain tumor PERMENANT FOR PATHOLOGY                                            Clinical Information    The working history is brain mass.      Final Diagnosis   TEMPORAL LOBE MASS (1 - 2):  Glioblastoma multiforme.  DGD/mbc    Electronically signed by Mitch Lee MD for He Babin MD on 9/28/2020 at 1016   Preliminary result electronically signed by He Babin MD on 9/2/2020 at 1533   Comment     contacted and the case discussed (DGD).   Intraoperative Consultation    Frozen section: Verbal report given to Dr. Harrell/Ancelmo via speakerphone on 8/28/2020 at 08:22.  FROZEN SECTION DIAGNOSIS: FS1/SQ1 Spec\"A\" - cystic temporal lobe mass - astrocytoma, pleomorphic nuclei, no necrosis or endothelial prolif in planes of section examined; ddx PXA , high grade astrocytoma, ganglioglioma, defer otherwise to " permanent section examination (DGD)  Stains used for Immediate Evaluation are acceptable.               Gross Description    Specimen 1 received fresh for frozen section labeled as brain tumor left is a 1 x 0.4 x 0.1 cm fragment of tan-red soft tissue, submitted entirely for frozen section and resubmitted in block 1A.      Specimen 2 received in formalin in a clear plastic trap, labeled as brain tumor, consists of a 2 x 1 x 0.2 cm aggregate of fragmented pink foamy tissue, wrapped and submitted entirely in block 2A.     LDP:ecv      Special Stains    [email or web address]  Molecular Genetics  TERT Gene Promoter Mutation Analysis   Detected  Mutation(s): -124C>T     Molecular Genetics  IDH1/IDH2 Mutation Analysis   IDH1 Mutation: Not Detected  IDH2 Mutation: Not Detected     FISH Analysis  1p/19q Tricolor Deletion   1p/19q Co-deletion: NOT DETECTED     Molecular Genetics  MGMT Gene Promoter Methylation Analysis   MGMT Gene Promoter Methylation: Detected  Percent of MGMT Methylation: 30.56     Testing performed at outside laboratory. See scanned report.    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 The tumor shows the astrocytic elements to have a high degree of nuclear pleomorphism and a variable degree of cytoplasmic volume and extension of cytoplasmic processes. Mitotic activity is noted. The changes in specimens 1 and 2 are compatible with a glioblastoma multiforme. Molecular testing is pending.                  The following portions of the patient's history were reviewed and updated as appropriate: allergies, current medications, past family history, past medical history, past social history, past surgical history and problem list.    Assessment      IMPRESSION:   Mr. Reardon is a very pleasant 55-year-old gentleman with a  left temporal lobe GBM IDH wild-type, tert mutant, 1P 19 Q9 co-del and MGM T methylated.  The patient is status post maximal resection and is now healed adequately to begin his concurrent chemotherapy and radiation treatments.  The patient is seen Dr. Casey in [facility] and has his supply of Temodar.  The patient was initially furred to [facility] in [place] but is unable to receive treatments there at this time hence part of the delay in his care.  We have attempted to simulate him today however there was maintenance on the CT simulator which prevented that from happening.  We will schedule him to return early next week and begin his radiation treatment planning as quickly as possible.  I would recommend concurrent Temodar and radiation to dose of 60 Gy in 30 fractions.  Be an initial 46 Cole volume taken to the T2 flair enhancing region on his postoperative MRI scans plus a margin.  The final 14 Cole will be to the T1 enhancing region plus a smaller margin.  VMAT/IMRT will be required to minimize dose to the brainstem and cochlea on the left side.  Daily image guidance will be utilized to adjust for rotations on daily basis. This is in concordance with the NCCN recommendations and guidelines.  The patient has already been scheduled for an MRI in December for follow-up.  I discussed the case with Dr. Harrell today.    The decision to treat the patient with definitive concurrent chemotherapy and radiation is a complex one and carries the risk of long-term side effects and complications.    I have personally reviewed the most updated version of the available NCCN guidelines in preparing to recommend care for this patient, and have discussed this with the patient personally.  Greater than 45 minutes was spent preparing for and coordinating this visit. >50% of the time was spent in direct face to face conversation with the patient teaching, answering question, and providing explanations regarding the patient's  case.      RECOMMENDATIONS:       left temporal lobe GBM IDH wild-type, tert mutant, 1P 19 Q9 co-del and MGM T methylated  -Status post maximal resection with Dr. Harrell on 8/29/2020.  -Has healed adequately  - Has Temodar for concurrent chemotherapy radiation prescribed by Dr. Casey in [facility]  - Will undergo radiation planning as soon as possible.  -Recommend 60 Gy in 30 fractions with concurrent Temodar   -Has nausea medicines and antibiotics per standard of care  -Off of steroids at this time  -minimal neurologic deficits.    Seizures  -Likely related to his malignancy  -On Keppra and set to continue    Hyperlipidemia  -Continue present regimen  -Per primary    Hypertension  -Continue present regimen  -Per primary    Obesity  Complicates all care       Liam Herzog MD      Errors in dictation may reflect use of voice recognition software and not all errors in transcription may have been detected prior to signing.

## 2020-10-05 ENCOUNTER — HOSPITAL ENCOUNTER (OUTPATIENT)
Dept: RADIATION ONCOLOGY | Facility: HOSPITAL | Age: 55
Discharge: HOME | End: 2020-10-05

## 2020-10-05 PROCEDURE — 77334 RADIATION TREATMENT AID(S): CPT | Performed by: RADIOLOGY

## 2020-10-07 ENCOUNTER — TREATMENT (OUTPATIENT)
Dept: PHYSICAL THERAPY | Facility: CLINIC | Age: 55
End: 2020-10-07

## 2020-10-07 DIAGNOSIS — R41.841 COGNITIVE COMMUNICATION DEFICIT: ICD-10-CM

## 2020-10-07 DIAGNOSIS — C71.9 GLIOMA OF BRAIN (HCC): Primary | ICD-10-CM

## 2020-10-07 DIAGNOSIS — R47.01 EXPRESSIVE APHASIA: ICD-10-CM

## 2020-10-07 PROCEDURE — 92507 TX SP LANG VOICE COMM INDIV: CPT | Performed by: SPEECH-LANGUAGE PATHOLOGIST

## 2020-10-07 NOTE — PROGRESS NOTES
Outpatient Speech Language Pathology   Adult Speech Language Cognitive Treatment Note/Discharge       Patient Name: Kennedy Reardon  : 1965  MRN: [id number]  Today's Date: 10/7/2020         Visit Date: 10/07/2020   Patient Active Problem List   Diagnosis   • Dyslipidemia   • Hypertension   • 5 X 3.8cm L temporal glioma s/p crani and excision 2020   • Witnessed sz    • Glioma (CMS/HCC)   • Current smoker   • Alcohol use   • Class 1 obesity in adult   • Bronchospasm          Visit Dx:    ICD-10-CM ICD-9-CM   1. Glioma of brain (CMS/HCC)  C71.9 191.9   2. Expressive aphasia  R47.01 784.3   3. Cognitive communication deficit  R41.841 799.52           Long Term Goals:  1. Pt will improve speech, language, cognitive skills for communicational abilities in all settings and contexts.   2. Pt will develop functional, cognitive-linguistic-based skills and utilize compensatory strategies to communicate wants and needs effectively to different conversational partners, maintain safety and participate socially in functional living environment        Short Term Goals:  1. Pt will improve word finding in conversation in 4/5 opp w/ min cues over 3 sessions.   *pt has no word finding deficits during today's entire session. He produces sentences, structure tasks, and conversations WFL independently. GOAL MET     2. Pt will respond to moderate oe questions in 4/5 opp w/ min-mod cues over 3 sessions.  *pt responds to moderate oe questions in 10/10 opp independently. GOAL MET     3. Pt will verbally respond to general questions w/ MLU of 5+ word length in 8/10 opp w/ min cues across three sessions  *pt responds w/ avg of 5+ responses during conversational topics and structured tasks w/ min cues and prompts for expansion WFL GOAL MET     4. Pt will recall activities of daily living to improve short term memory in 4/5 opp w/ min cues across three sessions.   *pt recalls ADLS from STM events w/ 100% acc in 5/5 opp this session  after questioned from SLP GOAL MET     5. Pt will answer open-ended questions regarding situational/biographical/environmental information in 4/5 opp w/ min cues across three sessions.  *pt provides medical history and upcoming appts independently this session w/o delay. Improvement from initial evaluation GOAL MET     6. Pt will name/describe objects/pictures in 8/10 opp w/ min cues across three sessions  *pt names describes and compare/contrasts objects/ pictures in 10/10 opp  GOAL MET      7. Pt will demonstrate recall of functional information following a long-term delay or related to LT events in 4/5 opp w/ min cues across three sessions  *pt recalls events from LTM and ADLS WFL GOAL MET        *goals to be modified/changed as warranted        No dysfluencies noted during today's evaluation. Pt reports no difficulties w/ speech, language, cognition in generalization; except in evenings when fatigued. He reports he  Sometimes has trouble w/ writing person information, such as when at doctors office. Pt given task of writing biographical data for today's session; he completes entire questionnaire and fill in blank biographical data WFL w/o delay or deficit.       SLP d/w pt recommendation and results w/ verbal understanding and agreement. D/w pt generalization tasks such as conversations, reading, functional skills/sequencing. He reports verbal understanding and agreement.       Pt wears sunglasses during session; reports continued visual and light disturbances. He wears mask during session. Reports he had stitches from brain sx removed.     Pt reports he had MRI 10/1 WFL; she reports he had consult w/ MD Friday 10/2 to determine radiation/chemo treatment plan. He reports he will receive 42 days of consecutive treatment. He reports they recommended he cancel appt w/ eye MD s/t possibility of changes from chemo/radiation.     He has met all goals for speech therapy at this time. He is able to communicate and  function independently in generalization w/ ADLS. Discharged at this time. Made aware if changes occur s/p chemo/radiation/other medical changes, he can seek new referral for warranted services pending MD. He states verbal agreement and understanding for discharge.            Thank you for allowing me to participate in the care of your patient-  Robbi Walker MA CCC-SLP                  Sherry Walker MA,CCC-SLP  10/7/2020

## 2020-10-14 PROCEDURE — 77338 DESIGN MLC DEVICE FOR IMRT: CPT | Performed by: RADIOLOGY

## 2020-10-14 PROCEDURE — 77300 RADIATION THERAPY DOSE PLAN: CPT | Performed by: RADIOLOGY

## 2020-10-14 PROCEDURE — 77301 RADIOTHERAPY DOSE PLAN IMRT: CPT | Performed by: RADIOLOGY

## 2020-10-19 ENCOUNTER — HOSPITAL ENCOUNTER (OUTPATIENT)
Dept: RADIATION ONCOLOGY | Facility: HOSPITAL | Age: 55
Discharge: HOME | End: 2020-10-19

## 2020-10-19 PROCEDURE — 77386: CPT | Performed by: RADIOLOGY

## 2020-10-20 ENCOUNTER — HOSPITAL ENCOUNTER (OUTPATIENT)
Dept: RADIATION ONCOLOGY | Facility: HOSPITAL | Age: 55
Discharge: HOME | End: 2020-10-20

## 2020-10-20 VITALS — WEIGHT: 215.7 LBS | BODY MASS INDEX: 32.8 KG/M2

## 2020-10-20 PROCEDURE — 77386: CPT | Performed by: RADIOLOGY

## 2020-10-21 ENCOUNTER — HOSPITAL ENCOUNTER (OUTPATIENT)
Dept: RADIATION ONCOLOGY | Facility: HOSPITAL | Age: 55
Discharge: HOME | End: 2020-10-21

## 2020-10-21 ENCOUNTER — DOCUMENTATION (OUTPATIENT)
Dept: NUTRITION | Facility: HOSPITAL | Age: 55
End: 2020-10-21

## 2020-10-21 PROCEDURE — 77386: CPT | Performed by: RADIOLOGY

## 2020-10-21 NOTE — PROGRESS NOTES
Oncology Nutrition Screening    Patient Name:  Kennedy eRardon  YOB: 1965  MRN: [id number]  Date:  10/21/20  Physician:  Dr. Liam Herzog / Dr. 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     • atenolol (TENORMIN) 50 MG tablet Take 50 mg by mouth Daily.     • cetirizine (zyrTEC) 10 MG tablet Take 10 mg by mouth Daily.     • docusate sodium 100 MG capsule Take 1capsule by mouth 2 (Two) Times a Day As Needed for Constipation. 30 each 2   • levETIRAcetam (KEPPRA) 500 MG tablet Take 1 tablet by mouth Every 12 (Twelve) Hours. 180 tablet 4   • losartan (COZAAR) 50 MG tablet Take 50 mg by mouth Daily.     • lovastatin (MEVACOR) 20 MG tablet Take 20 mg by mouth 2 (two) times a day.     • ondansetron (ZOFRAN) 4 MG tablet Take 1 tablet by mouth Every 8 (Eight) Hours As Needed for Nausea or Vomiting. 90 tablet 5   • prochlorperazine (COMPAZINE) 10 MG tablet Take 1 tablet by mouth Every 8 (Eight) Hours As Needed for Nausea or Vomiting. 90 tablet 5   • sulfamethoxazole-trimethoprim (BACTRIM DS,SEPTRA DS) 800-160 MG per tablet Take 1 tablet by mouth 3 (Three) Times a Week. Take 1 tablet on Mondays, Wednesdays and Fridays. 12 tablet 7   • temozolomide (Temodar) 140 MG chemo capsule Take 1 capsule by mouth Daily. With 20 mg capsule for 42 days with radiation 42 capsule 0   • temozolomide (Temodar) 20 MG chemo  capsule Take 1 capsule by mouth Daily. with 140 mg capsule for 42 days with radiation 42 capsule 0     No current facility-administered medications for this visit.        Glycemic Risk:   Patient no longer taking steroids    Weight:   Height: 68 inches  Weight: 215.7 lbs.  Usual Body Weight: same    BMI: 32.8  Obese  Weight has been stable    Oral Food Intake:  Regular Diet - No Restrictions    Hydration Status:   How many 8 ounce glass of water of fluid do you drink per day?  Goal for 100 ounces (+)    Enteral Feeding:   NA    Nutrition Symptoms:   No Problems with Eating    Activity:   Not my normal self, but able to be up and about with fairly normal activities     reports that he has been smoking. He has been smoking about 0.50 packs per day. He has never used smokeless tobacco. He reports that he does not drink alcohol.    Evaluation of Nutritional Risk:   Patient is not identified at nutritional risk for malnutrition.  Patient retains a good appetite and eating well per recall of intake.  Discussed the importance of nutrition with diagnosis and treatment; discussed possible side effects of taste changes and dry mouth that may be associated with brain radiation.  Encouraged patient to maintain good oral care, daily use of the baking soda, salt and water mouth rinses numerous times throughout the day.  Written patient education provided.  Will continue to follow patient through course of treatment.        Electronically signed by:  Macarena Dia, CHICHI  14:34 EDT

## 2020-10-22 ENCOUNTER — LAB (OUTPATIENT)
Dept: LAB | Facility: HOSPITAL | Age: 55
End: 2020-10-22

## 2020-10-22 ENCOUNTER — HOSPITAL ENCOUNTER (OUTPATIENT)
Dept: RADIATION ONCOLOGY | Facility: HOSPITAL | Age: 55
Discharge: HOME | End: 2020-10-22

## 2020-10-22 DIAGNOSIS — C71.9 GLIOBLASTOMA (HCC): Primary | ICD-10-CM

## 2020-10-22 LAB
ALBUMIN SERPL-MCNC: 4.5 G/DL (ref 3.5–5.2)
ALBUMIN/GLOB SERPL: 1.7 G/DL
ALP SERPL-CCNC: 96 U/L (ref 39–117)
ALT SERPL W P-5'-P-CCNC: 36 U/L (ref 1–41)
ANION GAP SERPL CALCULATED.3IONS-SCNC: 10 MMOL/L (ref 5–15)
AST SERPL-CCNC: 26 U/L (ref 1–40)
BILIRUB SERPL-MCNC: 0.4 MG/DL (ref 0–1.2)
BUN SERPL-MCNC: 8 MG/DL (ref 6–20)
BUN/CREAT SERPL: 9.4 (ref 7–25)
CALCIUM SPEC-SCNC: 9.6 MG/DL (ref 8.6–10.5)
CHLORIDE SERPL-SCNC: 102 MMOL/L (ref 98–107)
CO2 SERPL-SCNC: 25 MMOL/L (ref 22–29)
CREAT SERPL-MCNC: 0.85 MG/DL (ref 0.76–1.27)
ERYTHROCYTE [DISTWIDTH] IN BLOOD BY AUTOMATED COUNT: 13.2 % (ref 12.3–15.4)
GFR SERPL CREATININE-BSD FRML MDRD: 94 ML/MIN/1.73
GLOBULIN UR ELPH-MCNC: 2.7 GM/DL
GLUCOSE SERPL-MCNC: 108 MG/DL (ref 65–99)
HCT VFR BLD AUTO: 43.5 % (ref 37.5–51)
HGB BLD-MCNC: 14.8 G/DL (ref 13–17.7)
LYMPHOCYTES # BLD AUTO: 3.3 10*3/MM3 (ref 0.7–3.1)
LYMPHOCYTES NFR BLD AUTO: 32.4 % (ref 19.6–45.3)
MCH RBC QN AUTO: 32.1 PG (ref 26.6–33)
MCHC RBC AUTO-ENTMCNC: 34 G/DL (ref 31.5–35.7)
MCV RBC AUTO: 94.6 FL (ref 79–97)
MONOCYTES # BLD AUTO: 0.6 10*3/MM3 (ref 0.1–0.9)
MONOCYTES NFR BLD AUTO: 6.3 % (ref 5–12)
NEUTROPHILS NFR BLD AUTO: 6.2 10*3/MM3 (ref 1.7–7)
NEUTROPHILS NFR BLD AUTO: 61.3 % (ref 42.7–76)
PLATELET # BLD AUTO: 251 10*3/MM3 (ref 140–450)
PMV BLD AUTO: 7.6 FL (ref 6–12)
POTASSIUM SERPL-SCNC: 4.4 MMOL/L (ref 3.5–5.2)
PROT SERPL-MCNC: 7.2 G/DL (ref 6–8.5)
RBC # BLD AUTO: 4.6 10*6/MM3 (ref 4.14–5.8)
SODIUM SERPL-SCNC: 137 MMOL/L (ref 136–145)
WBC # BLD AUTO: 10.1 10*3/MM3 (ref 3.4–10.8)

## 2020-10-22 PROCEDURE — 80053 COMPREHEN METABOLIC PANEL: CPT

## 2020-10-22 PROCEDURE — 77386: CPT | Performed by: RADIOLOGY

## 2020-10-22 PROCEDURE — 85025 COMPLETE CBC W/AUTO DIFF WBC: CPT

## 2020-10-22 PROCEDURE — 36415 COLL VENOUS BLD VENIPUNCTURE: CPT

## 2020-10-23 ENCOUNTER — HOSPITAL ENCOUNTER (OUTPATIENT)
Dept: RADIATION ONCOLOGY | Facility: HOSPITAL | Age: 55
Discharge: HOME | End: 2020-10-23

## 2020-10-23 PROCEDURE — 77336 RADIATION PHYSICS CONSULT: CPT | Performed by: RADIOLOGY

## 2020-10-23 PROCEDURE — 77386: CPT | Performed by: RADIOLOGY

## 2020-10-26 ENCOUNTER — HOSPITAL ENCOUNTER (OUTPATIENT)
Dept: RADIATION ONCOLOGY | Facility: HOSPITAL | Age: 55
Discharge: HOME | End: 2020-10-26

## 2020-10-26 PROCEDURE — 77386: CPT | Performed by: RADIOLOGY

## 2020-10-27 ENCOUNTER — HOSPITAL ENCOUNTER (OUTPATIENT)
Dept: RADIATION ONCOLOGY | Facility: HOSPITAL | Age: 55
Discharge: HOME | End: 2020-10-27

## 2020-10-27 ENCOUNTER — DOCUMENTATION (OUTPATIENT)
Dept: NUTRITION | Facility: HOSPITAL | Age: 55
End: 2020-10-27

## 2020-10-27 VITALS — WEIGHT: 217.9 LBS | BODY MASS INDEX: 33.13 KG/M2

## 2020-10-27 PROCEDURE — 77386: CPT | Performed by: RADIOLOGY

## 2020-10-28 ENCOUNTER — HOSPITAL ENCOUNTER (OUTPATIENT)
Dept: RADIATION ONCOLOGY | Facility: HOSPITAL | Age: 55
Discharge: HOME | End: 2020-10-28

## 2020-10-28 PROCEDURE — 77386: CPT | Performed by: RADIOLOGY

## 2020-10-29 ENCOUNTER — HOSPITAL ENCOUNTER (OUTPATIENT)
Dept: RADIATION ONCOLOGY | Facility: HOSPITAL | Age: 55
Discharge: HOME | End: 2020-10-29

## 2020-10-29 ENCOUNTER — LAB (OUTPATIENT)
Dept: LAB | Facility: HOSPITAL | Age: 55
End: 2020-10-29

## 2020-10-29 ENCOUNTER — SPECIALTY PHARMACY (OUTPATIENT)
Dept: PHARMACY | Facility: HOSPITAL | Age: 55
End: 2020-10-29

## 2020-10-29 DIAGNOSIS — G93.89 BRAIN MASS: ICD-10-CM

## 2020-10-29 LAB
ALBUMIN SERPL-MCNC: 4.5 G/DL (ref 3.5–5.2)
ALBUMIN/GLOB SERPL: 1.7 G/DL
ALP SERPL-CCNC: 96 U/L (ref 39–117)
ALT SERPL W P-5'-P-CCNC: 68 U/L (ref 1–41)
ANION GAP SERPL CALCULATED.3IONS-SCNC: 11 MMOL/L (ref 5–15)
AST SERPL-CCNC: 41 U/L (ref 1–40)
BASOPHILS # BLD AUTO: 0.05 10*3/MM3 (ref 0–0.2)
BASOPHILS NFR BLD AUTO: 0.6 % (ref 0–1.5)
BILIRUB SERPL-MCNC: 0.4 MG/DL (ref 0–1.2)
BUN SERPL-MCNC: 8 MG/DL (ref 6–20)
BUN/CREAT SERPL: 8.1 (ref 7–25)
CALCIUM SPEC-SCNC: 9.5 MG/DL (ref 8.6–10.5)
CHLORIDE SERPL-SCNC: 102 MMOL/L (ref 98–107)
CO2 SERPL-SCNC: 26 MMOL/L (ref 22–29)
CREAT SERPL-MCNC: 0.99 MG/DL (ref 0.76–1.27)
DEPRECATED RDW RBC AUTO: 43.5 FL (ref 37–54)
EOSINOPHIL # BLD AUTO: 0.21 10*3/MM3 (ref 0–0.4)
EOSINOPHIL NFR BLD AUTO: 2.6 % (ref 0.3–6.2)
ERYTHROCYTE [DISTWIDTH] IN BLOOD BY AUTOMATED COUNT: 12.6 % (ref 12.3–15.4)
GFR SERPL CREATININE-BSD FRML MDRD: 79 ML/MIN/1.73
GLOBULIN UR ELPH-MCNC: 2.6 GM/DL
GLUCOSE SERPL-MCNC: 99 MG/DL (ref 65–99)
HCT VFR BLD AUTO: 44.3 % (ref 37.5–51)
HGB BLD-MCNC: 14.6 G/DL (ref 13–17.7)
IMM GRANULOCYTES # BLD AUTO: 0.02 10*3/MM3 (ref 0–0.05)
IMM GRANULOCYTES NFR BLD AUTO: 0.3 % (ref 0–0.5)
LYMPHOCYTES # BLD AUTO: 2.38 10*3/MM3 (ref 0.7–3.1)
LYMPHOCYTES NFR BLD AUTO: 30 % (ref 19.6–45.3)
MCH RBC QN AUTO: 31 PG (ref 26.6–33)
MCHC RBC AUTO-ENTMCNC: 33 G/DL (ref 31.5–35.7)
MCV RBC AUTO: 94.1 FL (ref 79–97)
MONOCYTES # BLD AUTO: 0.7 10*3/MM3 (ref 0.1–0.9)
MONOCYTES NFR BLD AUTO: 8.8 % (ref 5–12)
NEUTROPHILS NFR BLD AUTO: 4.58 10*3/MM3 (ref 1.7–7)
NEUTROPHILS NFR BLD AUTO: 57.7 % (ref 42.7–76)
NRBC BLD AUTO-RTO: 0 /100 WBC (ref 0–0.2)
PLATELET # BLD AUTO: 244 10*3/MM3 (ref 140–450)
PMV BLD AUTO: 10.8 FL (ref 6–12)
POTASSIUM SERPL-SCNC: 4.3 MMOL/L (ref 3.5–5.2)
PROT SERPL-MCNC: 7.1 G/DL (ref 6–8.5)
RBC # BLD AUTO: 4.71 10*6/MM3 (ref 4.14–5.8)
SODIUM SERPL-SCNC: 139 MMOL/L (ref 136–145)
WBC # BLD AUTO: 7.94 10*3/MM3 (ref 3.4–10.8)

## 2020-10-29 PROCEDURE — 85025 COMPLETE CBC W/AUTO DIFF WBC: CPT

## 2020-10-29 PROCEDURE — 77386: CPT | Performed by: RADIOLOGY

## 2020-10-29 PROCEDURE — 77336 RADIATION PHYSICS CONSULT: CPT | Performed by: RADIOLOGY

## 2020-10-29 PROCEDURE — 80053 COMPREHEN METABOLIC PANEL: CPT

## 2020-10-30 ENCOUNTER — HOSPITAL ENCOUNTER (OUTPATIENT)
Dept: RADIATION ONCOLOGY | Facility: HOSPITAL | Age: 55
Discharge: HOME | End: 2020-10-30

## 2020-10-30 PROCEDURE — 77386: CPT | Performed by: RADIOLOGY

## 2020-11-02 ENCOUNTER — HOSPITAL ENCOUNTER (OUTPATIENT)
Dept: RADIATION ONCOLOGY | Facility: HOSPITAL | Age: 55
Setting detail: RADIATION/ONCOLOGY SERIES
Discharge: HOME | End: 2020-11-02

## 2020-11-02 ENCOUNTER — HOSPITAL ENCOUNTER (OUTPATIENT)
Dept: RADIATION ONCOLOGY | Facility: HOSPITAL | Age: 55
Discharge: HOME | End: 2020-11-02

## 2020-11-02 PROCEDURE — 77386: CPT | Performed by: RADIOLOGY

## 2020-11-03 ENCOUNTER — HOSPITAL ENCOUNTER (OUTPATIENT)
Dept: RADIATION ONCOLOGY | Facility: HOSPITAL | Age: 55
Discharge: HOME | End: 2020-11-03

## 2020-11-03 VITALS — WEIGHT: 217.8 LBS | BODY MASS INDEX: 33.12 KG/M2

## 2020-11-03 PROCEDURE — 77386: CPT | Performed by: RADIOLOGY

## 2020-11-04 ENCOUNTER — HOSPITAL ENCOUNTER (OUTPATIENT)
Dept: RADIATION ONCOLOGY | Facility: HOSPITAL | Age: 55
Discharge: HOME | End: 2020-11-04

## 2020-11-04 PROCEDURE — 77386: CPT | Performed by: RADIOLOGY

## 2020-11-05 ENCOUNTER — HOSPITAL ENCOUNTER (OUTPATIENT)
Dept: RADIATION ONCOLOGY | Facility: HOSPITAL | Age: 55
Discharge: HOME | End: 2020-11-05

## 2020-11-05 ENCOUNTER — LAB (OUTPATIENT)
Dept: LAB | Facility: HOSPITAL | Age: 55
End: 2020-11-05

## 2020-11-05 DIAGNOSIS — C71.9 GLIOBLASTOMA (HCC): Primary | ICD-10-CM

## 2020-11-05 LAB
ALBUMIN SERPL-MCNC: 4.6 G/DL (ref 3.5–5.2)
ALBUMIN/GLOB SERPL: 2.1 G/DL
ALP SERPL-CCNC: 102 U/L (ref 39–117)
ALT SERPL W P-5'-P-CCNC: 76 U/L (ref 1–41)
ANION GAP SERPL CALCULATED.3IONS-SCNC: 10 MMOL/L (ref 5–15)
AST SERPL-CCNC: 36 U/L (ref 1–40)
BILIRUB SERPL-MCNC: 0.4 MG/DL (ref 0–1.2)
BUN SERPL-MCNC: 8 MG/DL (ref 6–20)
BUN/CREAT SERPL: 9.8 (ref 7–25)
CALCIUM SPEC-SCNC: 9.7 MG/DL (ref 8.6–10.5)
CHLORIDE SERPL-SCNC: 102 MMOL/L (ref 98–107)
CO2 SERPL-SCNC: 26 MMOL/L (ref 22–29)
CREAT SERPL-MCNC: 0.82 MG/DL (ref 0.76–1.27)
ERYTHROCYTE [DISTWIDTH] IN BLOOD BY AUTOMATED COUNT: 13.6 % (ref 12.3–15.4)
GFR SERPL CREATININE-BSD FRML MDRD: 98 ML/MIN/1.73
GLOBULIN UR ELPH-MCNC: 2.2 GM/DL
GLUCOSE SERPL-MCNC: 97 MG/DL (ref 65–99)
HCT VFR BLD AUTO: 47.2 % (ref 37.5–51)
HGB BLD-MCNC: 15.2 G/DL (ref 13–17.7)
LYMPHOCYTES # BLD AUTO: 3.1 10*3/MM3 (ref 0.7–3.1)
LYMPHOCYTES NFR BLD AUTO: 32.2 % (ref 19.6–45.3)
MCH RBC QN AUTO: 30.6 PG (ref 26.6–33)
MCHC RBC AUTO-ENTMCNC: 32.2 G/DL (ref 31.5–35.7)
MCV RBC AUTO: 95.1 FL (ref 79–97)
MONOCYTES # BLD AUTO: 0.7 10*3/MM3 (ref 0.1–0.9)
MONOCYTES NFR BLD AUTO: 6.9 % (ref 5–12)
NEUTROPHILS NFR BLD AUTO: 5.8 10*3/MM3 (ref 1.7–7)
NEUTROPHILS NFR BLD AUTO: 60.9 % (ref 42.7–76)
PLATELET # BLD AUTO: 280 10*3/MM3 (ref 140–450)
PMV BLD AUTO: 8.1 FL (ref 6–12)
POTASSIUM SERPL-SCNC: 4.6 MMOL/L (ref 3.5–5.2)
PROT SERPL-MCNC: 6.8 G/DL (ref 6–8.5)
RBC # BLD AUTO: 4.97 10*6/MM3 (ref 4.14–5.8)
SODIUM SERPL-SCNC: 138 MMOL/L (ref 136–145)
WBC # BLD AUTO: 9.5 10*3/MM3 (ref 3.4–10.8)

## 2020-11-05 PROCEDURE — 80053 COMPREHEN METABOLIC PANEL: CPT

## 2020-11-05 PROCEDURE — 36415 COLL VENOUS BLD VENIPUNCTURE: CPT

## 2020-11-05 PROCEDURE — 77386: CPT | Performed by: RADIOLOGY

## 2020-11-05 PROCEDURE — 85025 COMPLETE CBC W/AUTO DIFF WBC: CPT

## 2020-11-06 ENCOUNTER — HOSPITAL ENCOUNTER (OUTPATIENT)
Dept: RADIATION ONCOLOGY | Facility: HOSPITAL | Age: 55
Discharge: HOME | End: 2020-11-06

## 2020-11-06 PROCEDURE — 77336 RADIATION PHYSICS CONSULT: CPT | Performed by: RADIOLOGY

## 2020-11-06 PROCEDURE — 77386: CPT | Performed by: RADIOLOGY

## 2020-11-09 ENCOUNTER — HOSPITAL ENCOUNTER (OUTPATIENT)
Dept: RADIATION ONCOLOGY | Facility: HOSPITAL | Age: 55
Discharge: HOME | End: 2020-11-09

## 2020-11-09 PROCEDURE — 77386: CPT | Performed by: RADIOLOGY

## 2020-11-10 ENCOUNTER — HOSPITAL ENCOUNTER (OUTPATIENT)
Dept: RADIATION ONCOLOGY | Facility: HOSPITAL | Age: 55
Discharge: HOME | End: 2020-11-10

## 2020-11-10 ENCOUNTER — DOCUMENTATION (OUTPATIENT)
Dept: NUTRITION | Facility: HOSPITAL | Age: 55
End: 2020-11-10

## 2020-11-10 VITALS — BODY MASS INDEX: 32.96 KG/M2 | WEIGHT: 216.8 LBS

## 2020-11-10 PROCEDURE — 77386: CPT | Performed by: RADIOLOGY

## 2020-11-10 NOTE — PROGRESS NOTES
ONC Nutrition     Diagnosis: GBM of the left temporal lobe of the brain status post resection: IDH wild-type, tert mutant, 1P 19 Q non-co-deleted, MGM T methylated  Surgery: Surgical Resection 8/29/20  Concurrent Chemoradiation: Temodar /  60 Gy in 30 fractions / patient has completed 17/30 fractions     Weight 216.8 lbs     Patient continues with good appetite and oral food intake. He is now using the baking soda salt and water mouth rinse numerous times throughout the day, stating that it had definitely been effective with helping to manage the taste changes that he was experiencing.  No other issues noted.

## 2020-11-12 ENCOUNTER — LAB (OUTPATIENT)
Dept: LAB | Facility: HOSPITAL | Age: 55
End: 2020-11-12

## 2020-11-12 ENCOUNTER — HOSPITAL ENCOUNTER (OUTPATIENT)
Dept: RADIATION ONCOLOGY | Facility: HOSPITAL | Age: 55
Discharge: HOME | End: 2020-11-12

## 2020-11-12 DIAGNOSIS — C71.9 GLIOBLASTOMA (HCC): Primary | ICD-10-CM

## 2020-11-12 LAB
ALBUMIN SERPL-MCNC: 4.6 G/DL (ref 3.5–5.2)
ALBUMIN/GLOB SERPL: 2 G/DL
ALP SERPL-CCNC: 102 U/L (ref 39–117)
ALT SERPL W P-5'-P-CCNC: 77 U/L (ref 1–41)
ANION GAP SERPL CALCULATED.3IONS-SCNC: 11 MMOL/L (ref 5–15)
AST SERPL-CCNC: 39 U/L (ref 1–40)
BILIRUB SERPL-MCNC: 0.4 MG/DL (ref 0–1.2)
BUN SERPL-MCNC: 7 MG/DL (ref 6–20)
BUN/CREAT SERPL: 7.2 (ref 7–25)
CALCIUM SPEC-SCNC: 9.7 MG/DL (ref 8.6–10.5)
CHLORIDE SERPL-SCNC: 102 MMOL/L (ref 98–107)
CO2 SERPL-SCNC: 26 MMOL/L (ref 22–29)
CREAT SERPL-MCNC: 0.97 MG/DL (ref 0.76–1.27)
ERYTHROCYTE [DISTWIDTH] IN BLOOD BY AUTOMATED COUNT: 13.7 % (ref 12.3–15.4)
GFR SERPL CREATININE-BSD FRML MDRD: 81 ML/MIN/1.73
GLOBULIN UR ELPH-MCNC: 2.3 GM/DL
GLUCOSE SERPL-MCNC: 85 MG/DL (ref 65–99)
HCT VFR BLD AUTO: 44.3 % (ref 37.5–51)
HGB BLD-MCNC: 14.8 G/DL (ref 13–17.7)
LYMPHOCYTES # BLD AUTO: 2.4 10*3/MM3 (ref 0.7–3.1)
LYMPHOCYTES NFR BLD AUTO: 29.1 % (ref 19.6–45.3)
MCH RBC QN AUTO: 31.4 PG (ref 26.6–33)
MCHC RBC AUTO-ENTMCNC: 33.3 G/DL (ref 31.5–35.7)
MCV RBC AUTO: 94.2 FL (ref 79–97)
MONOCYTES # BLD AUTO: 0.6 10*3/MM3 (ref 0.1–0.9)
MONOCYTES NFR BLD AUTO: 7.2 % (ref 5–12)
NEUTROPHILS NFR BLD AUTO: 5.4 10*3/MM3 (ref 1.7–7)
NEUTROPHILS NFR BLD AUTO: 63.7 % (ref 42.7–76)
PLATELET # BLD AUTO: 253 10*3/MM3 (ref 140–450)
PMV BLD AUTO: 7.9 FL (ref 6–12)
POTASSIUM SERPL-SCNC: 4.8 MMOL/L (ref 3.5–5.2)
PROT SERPL-MCNC: 6.9 G/DL (ref 6–8.5)
RBC # BLD AUTO: 4.7 10*6/MM3 (ref 4.14–5.8)
SODIUM SERPL-SCNC: 139 MMOL/L (ref 136–145)
WBC # BLD AUTO: 8.4 10*3/MM3 (ref 3.4–10.8)

## 2020-11-12 PROCEDURE — 36415 COLL VENOUS BLD VENIPUNCTURE: CPT

## 2020-11-12 PROCEDURE — 85025 COMPLETE CBC W/AUTO DIFF WBC: CPT

## 2020-11-12 PROCEDURE — 80053 COMPREHEN METABOLIC PANEL: CPT

## 2020-11-12 PROCEDURE — 77386: CPT | Performed by: RADIOLOGY

## 2020-11-12 PROCEDURE — 77336 RADIATION PHYSICS CONSULT: CPT | Performed by: RADIOLOGY

## 2020-11-13 ENCOUNTER — HOSPITAL ENCOUNTER (OUTPATIENT)
Dept: RADIATION ONCOLOGY | Facility: HOSPITAL | Age: 55
Discharge: HOME | End: 2020-11-13

## 2020-11-13 PROCEDURE — 77386: CPT | Performed by: RADIOLOGY

## 2020-11-16 ENCOUNTER — HOSPITAL ENCOUNTER (OUTPATIENT)
Dept: RADIATION ONCOLOGY | Facility: HOSPITAL | Age: 55
Discharge: HOME | End: 2020-11-16

## 2020-11-16 PROCEDURE — 77386: CPT | Performed by: RADIOLOGY

## 2020-11-16 NOTE — PROGRESS NOTES
Specialty Pharmacy Note      Name:  Kennedy Reardon  :  1965  Date: 10/30/2020    Past Medical History:   Diagnosis Date   • Brain cancer (CMS/HCC)    • Hyperlipidemia    • Hypertension    • Medical history reviewed with no changes    • Seizures (CMS/HCC)        Past Surgical History:   Procedure Laterality Date   • CRANIOTOMY FOR TUMOR Left 2020    Procedure: CRANIOTOMY FOR TUMOR LEFT;  Surgeon: Gilbert Harrell MD;  Location: [facility];  Service: Neurosurgery;  Laterality: Left;       Social History     Socioeconomic History   • Marital status:      Spouse name: Not on file   • Number of children: Not on file   • Years of education: Not on file   • Highest education level: Not on file   Tobacco Use   • Smoking status: Current Every Day Smoker     Packs/day: 0.50   • Smokeless tobacco: Never Used   Substance and Sexual Activity   • Alcohol use: No     Comment: few beers every now and then       Family History   Problem Relation Age of Onset   • Osteoarthritis Mother    • Rheum arthritis Mother    • Hypertension Mother    • Cancer Father    • Osteoporosis Sister    • Osteoarthritis Maternal Grandfather    • Rheum arthritis Maternal Grandfather    • Heart disease Paternal Grandmother    • Cancer Paternal Grandfather    • Heart disease Paternal Grandfather    • Diabetes Paternal Grandfather        No Known Allergies    Current Outpatient Medications   Medication Sig Dispense Refill   • aspirin 81 MG chewable tablet Chew 81 mg Daily.     • atenolol (TENORMIN) 50 MG tablet Take 50 mg by mouth Daily.     • cetirizine (zyrTEC) 10 MG tablet Take 10 mg by mouth Daily.     • docusate sodium 100 MG capsule Take 1capsule by mouth 2 (Two) Times a Day As Needed for Constipation. 30 each 2   • levETIRAcetam (KEPPRA) 500 MG tablet Take 1 tablet by mouth Every 12 (Twelve) Hours. 180 tablet 4   • losartan (COZAAR) 50 MG tablet Take 50 mg by mouth Daily.     • lovastatin (MEVACOR) 20 MG tablet Take 20 mg by  mouth 2 (two) times a day.     • ondansetron (ZOFRAN) 4 MG tablet Take 1 tablet by mouth Every 8 (Eight) Hours As Needed for Nausea or Vomiting. 90 tablet 5   • prochlorperazine (COMPAZINE) 10 MG tablet Take 1 tablet by mouth Every 8 (Eight) Hours As Needed for Nausea or Vomiting. 90 tablet 5   • sulfamethoxazole-trimethoprim (BACTRIM DS,SEPTRA DS) 800-160 MG per tablet Take 1 tablet by mouth 3 (Three) Times a Week. Take 1 tablet on Mondays, Wednesdays and Fridays. 12 tablet 7   • temozolomide (Temodar) 140 MG chemo capsule Take 1 capsule by mouth Daily. With 20 mg capsule for 42 days with radiation 42 capsule 0   • temozolomide (Temodar) 20 MG chemo capsule Take 1 capsule by mouth Daily. with 140 mg capsule for 42 days with radiation 42 capsule 0     No current facility-administered medications for this visit.          LABORATORY:    Lab Results   Component Value Date    WBC 8.40 11/12/2020    HGB 14.8 11/12/2020    HCT 44.3 11/12/2020    MCV 94.2 11/12/2020    RDW 13.7 11/12/2020     11/12/2020    NEUTRORELPCT 63.7 11/12/2020    LYMPHORELPCT 29.1 11/12/2020    MONORELPCT 7.2 11/12/2020    EOSRELPCT 2.6 10/29/2020    BASORELPCT 0.6 10/29/2020    NEUTROABS 5.40 11/12/2020    LYMPHSABS 2.40 11/12/2020       Lab Results   Component Value Date     11/12/2020    K 4.8 11/12/2020    CO2 26.0 11/12/2020     11/12/2020    BUN 7 11/12/2020    CREATININE 0.97 11/12/2020    GLUCOSE 85 11/12/2020    CALCIUM 9.7 11/12/2020    ALKPHOS 102 11/12/2020    AST 39 11/12/2020    ALT 77 (H) 11/12/2020    BILITOT 0.4 11/12/2020    ALBUMIN 4.60 11/12/2020    PROTEINTOT 6.9 11/12/2020    PHOS 4.1 08/30/2020       No results found for: LDH, URICACID     Imaging Results (Last 24 Hours)     ** No results found for the last 24 hours. **          ASSESSMENT/PLAN:    Patient Update Assessment (new medications, allergies, medical history): Assessed    Medication(s): Temodar 75 mg/m2 (160 mg) with XRT    Currently Taking  Medication(s): Yes, currently taking as prescribed with XRT.    Effectiveness of Medication: N/A    Experiencing Side Effects: N/A    Prior Authorization Status: Was obtained with insurance    Financial Assistance Status: None needed at this time    Any Issues Identified: None identified    Appropriate to Process Prescription(s): Yes, after MD consult, chart review, and patient discussion, it is appropriate to process at this time. For the 160 mg dose, patient will take 1 capsule of each 140 mg and 20 mg daily with XRT for 42 days. Patient was also prescribed Zofran/Compazine for nausea and Bactrim for PCP prophylaxis.    Counseling Offered: Declined. Previously counseled. Patient aware to contact MD office or pharmacy with any questions/concerns about treatment.    Next Specialty Pharmacy Visit: In ~ 4 weeks

## 2020-11-17 ENCOUNTER — TELEPHONE (OUTPATIENT)
Dept: ONCOLOGY | Facility: CLINIC | Age: 55
End: 2020-11-17

## 2020-11-17 ENCOUNTER — HOSPITAL ENCOUNTER (OUTPATIENT)
Dept: RADIATION ONCOLOGY | Facility: HOSPITAL | Age: 55
Discharge: HOME | End: 2020-11-17

## 2020-11-17 VITALS — BODY MASS INDEX: 32.4 KG/M2 | WEIGHT: 213.1 LBS

## 2020-11-17 DIAGNOSIS — G93.89 BRAIN MASS: Primary | ICD-10-CM

## 2020-11-17 PROCEDURE — 77386: CPT | Performed by: RADIOLOGY

## 2020-11-17 NOTE — TELEPHONE ENCOUNTER
PATIENT'S WIFE CINTHIA ([facility]) CALLING, WANTS TO MAKE SURE THEY WILL LEAVE THE OFFICE BY 11 AM ON 12-1-20 BECAUSE HE HAS RADIATION IN [place] THAT DAY, IF NOT, CAN THEY COME IN A LITTLE EARLIER, PLEASE ADVISE?    CALL BACK #[phone]

## 2020-11-17 NOTE — TELEPHONE ENCOUNTER
PATIENT'S WIFE CINTHIA ([facility]) CALLING, SHE HAS QUESTIONS ABOUT HOW TO FINISH TAKING HIS TEMOZOLOMIDE DURING THANKSGIVING AND WHAT TO GET REFILLED NEXT, PLEASE ADVISE?    CALL BACK #[phone]

## 2020-11-18 ENCOUNTER — DOCUMENTATION (OUTPATIENT)
Dept: NUTRITION | Facility: HOSPITAL | Age: 55
End: 2020-11-18

## 2020-11-18 ENCOUNTER — HOSPITAL ENCOUNTER (OUTPATIENT)
Dept: RADIATION ONCOLOGY | Facility: HOSPITAL | Age: 55
Discharge: HOME | End: 2020-11-18

## 2020-11-18 PROCEDURE — 77386: CPT | Performed by: RADIOLOGY

## 2020-11-18 NOTE — PROGRESS NOTES
ONC Nutrition     Diagnosis: GBM of the left temporal lobe of the brain status post resection: IDH wild-type, tert mutant, 1P 19 Q non-co-deleted, MGM T methylated  Surgery: Surgical Resection 8/29/20  Concurrent Chemoradiation: Temodar /  60 Gy in 30 fractions / patient has completed 21/30 fractions     Weight 213.1 lbs /  Weight stable    FU with patient during RAD ONC status checks.  Patient continues to do well with progression of treatment.  He states that he began experiencing nausea over the weekend which he attributes to Temodar; he has managed it with Zofran which he states good control has been achieved today.  Continues to use the baking soda, salt and water mouth rinses for dry mouth.

## 2020-11-19 ENCOUNTER — LAB (OUTPATIENT)
Dept: LAB | Facility: HOSPITAL | Age: 55
End: 2020-11-19

## 2020-11-19 ENCOUNTER — HOSPITAL ENCOUNTER (OUTPATIENT)
Dept: RADIATION ONCOLOGY | Facility: HOSPITAL | Age: 55
Discharge: HOME | End: 2020-11-19

## 2020-11-19 DIAGNOSIS — C71.9 GLIOBLASTOMA (HCC): Primary | ICD-10-CM

## 2020-11-19 LAB
ALBUMIN SERPL-MCNC: 4.7 G/DL (ref 3.5–5.2)
ALBUMIN/GLOB SERPL: 2 G/DL
ALP SERPL-CCNC: 105 U/L (ref 39–117)
ALT SERPL W P-5'-P-CCNC: 48 U/L (ref 1–41)
ANION GAP SERPL CALCULATED.3IONS-SCNC: 11 MMOL/L (ref 5–15)
AST SERPL-CCNC: 23 U/L (ref 1–40)
BILIRUB SERPL-MCNC: 0.6 MG/DL (ref 0–1.2)
BUN SERPL-MCNC: 8 MG/DL (ref 6–20)
BUN/CREAT SERPL: 8.5 (ref 7–25)
CALCIUM SPEC-SCNC: 9.3 MG/DL (ref 8.6–10.5)
CHLORIDE SERPL-SCNC: 100 MMOL/L (ref 98–107)
CO2 SERPL-SCNC: 26 MMOL/L (ref 22–29)
CREAT SERPL-MCNC: 0.94 MG/DL (ref 0.76–1.27)
ERYTHROCYTE [DISTWIDTH] IN BLOOD BY AUTOMATED COUNT: 13.4 % (ref 12.3–15.4)
GFR SERPL CREATININE-BSD FRML MDRD: 84 ML/MIN/1.73
GLOBULIN UR ELPH-MCNC: 2.4 GM/DL
GLUCOSE SERPL-MCNC: 93 MG/DL (ref 65–99)
HCT VFR BLD AUTO: 43.9 % (ref 37.5–51)
HGB BLD-MCNC: 14.9 G/DL (ref 13–17.7)
LYMPHOCYTES # BLD AUTO: 2.4 10*3/MM3 (ref 0.7–3.1)
LYMPHOCYTES NFR BLD AUTO: 27.1 % (ref 19.6–45.3)
MCH RBC QN AUTO: 32 PG (ref 26.6–33)
MCHC RBC AUTO-ENTMCNC: 34 G/DL (ref 31.5–35.7)
MCV RBC AUTO: 94.1 FL (ref 79–97)
MONOCYTES # BLD AUTO: 0.7 10*3/MM3 (ref 0.1–0.9)
MONOCYTES NFR BLD AUTO: 7.7 % (ref 5–12)
NEUTROPHILS NFR BLD AUTO: 5.9 10*3/MM3 (ref 1.7–7)
NEUTROPHILS NFR BLD AUTO: 65.2 % (ref 42.7–76)
PLATELET # BLD AUTO: 248 10*3/MM3 (ref 140–450)
PMV BLD AUTO: 8.2 FL (ref 6–12)
POTASSIUM SERPL-SCNC: 4.1 MMOL/L (ref 3.5–5.2)
PROT SERPL-MCNC: 7.1 G/DL (ref 6–8.5)
RBC # BLD AUTO: 4.67 10*6/MM3 (ref 4.14–5.8)
SODIUM SERPL-SCNC: 137 MMOL/L (ref 136–145)
WBC # BLD AUTO: 9 10*3/MM3 (ref 3.4–10.8)

## 2020-11-19 PROCEDURE — 85025 COMPLETE CBC W/AUTO DIFF WBC: CPT

## 2020-11-19 PROCEDURE — 36415 COLL VENOUS BLD VENIPUNCTURE: CPT

## 2020-11-19 PROCEDURE — 77336 RADIATION PHYSICS CONSULT: CPT | Performed by: RADIOLOGY

## 2020-11-19 PROCEDURE — 80053 COMPREHEN METABOLIC PANEL: CPT

## 2020-11-19 PROCEDURE — 77386: CPT | Performed by: RADIOLOGY

## 2020-11-20 ENCOUNTER — HOSPITAL ENCOUNTER (OUTPATIENT)
Dept: RADIATION ONCOLOGY | Facility: HOSPITAL | Age: 55
Discharge: HOME | End: 2020-11-20

## 2020-11-20 PROCEDURE — 77386: CPT | Performed by: RADIOLOGY

## 2020-11-22 ENCOUNTER — HOSPITAL ENCOUNTER (OUTPATIENT)
Dept: RADIATION ONCOLOGY | Facility: HOSPITAL | Age: 55
Discharge: HOME | End: 2020-11-22

## 2020-11-22 PROCEDURE — 77386: CPT | Performed by: RADIOLOGY

## 2020-11-23 ENCOUNTER — HOSPITAL ENCOUNTER (OUTPATIENT)
Dept: RADIATION ONCOLOGY | Facility: HOSPITAL | Age: 55
Discharge: HOME | End: 2020-11-23

## 2020-11-23 PROCEDURE — 77386: CPT | Performed by: RADIOLOGY

## 2020-11-24 ENCOUNTER — HOSPITAL ENCOUNTER (OUTPATIENT)
Dept: RADIATION ONCOLOGY | Facility: HOSPITAL | Age: 55
Discharge: HOME | End: 2020-11-24

## 2020-11-24 VITALS — WEIGHT: 216.9 LBS | BODY MASS INDEX: 32.98 KG/M2

## 2020-11-24 PROCEDURE — 77386: CPT | Performed by: RADIOLOGY

## 2020-11-25 ENCOUNTER — TRANSCRIBE ORDERS (OUTPATIENT)
Dept: LAB | Facility: HOSPITAL | Age: 55
End: 2020-11-25

## 2020-11-25 ENCOUNTER — HOSPITAL ENCOUNTER (OUTPATIENT)
Dept: RADIATION ONCOLOGY | Facility: HOSPITAL | Age: 55
Discharge: HOME | End: 2020-11-25

## 2020-11-25 DIAGNOSIS — C71.9 GLIOBLASTOMA (HCC): Primary | ICD-10-CM

## 2020-11-25 LAB
ALBUMIN SERPL-MCNC: 4.6 G/DL (ref 3.5–5.2)
ALBUMIN/GLOB SERPL: 1.6 G/DL
ALP SERPL-CCNC: 96 U/L (ref 39–117)
ALT SERPL W P-5'-P-CCNC: 45 U/L (ref 1–41)
ANION GAP SERPL CALCULATED.3IONS-SCNC: 8 MMOL/L (ref 5–15)
AST SERPL-CCNC: 28 U/L (ref 1–40)
BASOPHILS # BLD AUTO: 0.06 10*3/MM3 (ref 0–0.2)
BASOPHILS NFR BLD AUTO: 0.8 % (ref 0–1.5)
BILIRUB SERPL-MCNC: 0.4 MG/DL (ref 0–1.2)
BUN SERPL-MCNC: 8 MG/DL (ref 6–20)
BUN/CREAT SERPL: 8.9 (ref 7–25)
CALCIUM SPEC-SCNC: 9.6 MG/DL (ref 8.6–10.5)
CHLORIDE SERPL-SCNC: 102 MMOL/L (ref 98–107)
CO2 SERPL-SCNC: 27 MMOL/L (ref 22–29)
CREAT SERPL-MCNC: 0.9 MG/DL (ref 0.76–1.27)
DEPRECATED RDW RBC AUTO: 42.7 FL (ref 37–54)
EOSINOPHIL # BLD AUTO: 0.24 10*3/MM3 (ref 0–0.4)
EOSINOPHIL NFR BLD AUTO: 3.1 % (ref 0.3–6.2)
ERYTHROCYTE [DISTWIDTH] IN BLOOD BY AUTOMATED COUNT: 12.5 % (ref 12.3–15.4)
GFR SERPL CREATININE-BSD FRML MDRD: 88 ML/MIN/1.73
GLOBULIN UR ELPH-MCNC: 2.9 GM/DL
GLUCOSE SERPL-MCNC: 115 MG/DL (ref 65–99)
HCT VFR BLD AUTO: 45 % (ref 37.5–51)
HGB BLD-MCNC: 15 G/DL (ref 13–17.7)
IMM GRANULOCYTES # BLD AUTO: 0.02 10*3/MM3 (ref 0–0.05)
IMM GRANULOCYTES NFR BLD AUTO: 0.3 % (ref 0–0.5)
LYMPHOCYTES # BLD AUTO: 1.84 10*3/MM3 (ref 0.7–3.1)
LYMPHOCYTES NFR BLD AUTO: 23.6 % (ref 19.6–45.3)
MCH RBC QN AUTO: 30.9 PG (ref 26.6–33)
MCHC RBC AUTO-ENTMCNC: 33.3 G/DL (ref 31.5–35.7)
MCV RBC AUTO: 92.8 FL (ref 79–97)
MONOCYTES # BLD AUTO: 0.68 10*3/MM3 (ref 0.1–0.9)
MONOCYTES NFR BLD AUTO: 8.7 % (ref 5–12)
NEUTROPHILS NFR BLD AUTO: 4.96 10*3/MM3 (ref 1.7–7)
NEUTROPHILS NFR BLD AUTO: 63.5 % (ref 42.7–76)
NRBC BLD AUTO-RTO: 0 /100 WBC (ref 0–0.2)
PLATELET # BLD AUTO: 258 10*3/MM3 (ref 140–450)
PMV BLD AUTO: 10.4 FL (ref 6–12)
POTASSIUM SERPL-SCNC: 5 MMOL/L (ref 3.5–5.2)
PROT SERPL-MCNC: 7.5 G/DL (ref 6–8.5)
RBC # BLD AUTO: 4.85 10*6/MM3 (ref 4.14–5.8)
SODIUM SERPL-SCNC: 137 MMOL/L (ref 136–145)
WBC # BLD AUTO: 7.8 10*3/MM3 (ref 3.4–10.8)

## 2020-11-25 PROCEDURE — 36415 COLL VENOUS BLD VENIPUNCTURE: CPT | Performed by: INTERNAL MEDICINE

## 2020-11-25 PROCEDURE — 77386: CPT | Performed by: RADIOLOGY

## 2020-11-25 PROCEDURE — 85025 COMPLETE CBC W/AUTO DIFF WBC: CPT | Performed by: INTERNAL MEDICINE

## 2020-11-25 PROCEDURE — 77336 RADIATION PHYSICS CONSULT: CPT | Performed by: RADIOLOGY

## 2020-11-25 PROCEDURE — 80053 COMPREHEN METABOLIC PANEL: CPT | Performed by: INTERNAL MEDICINE

## 2020-11-30 ENCOUNTER — TELEPHONE (OUTPATIENT)
Dept: ONCOLOGY | Facility: CLINIC | Age: 55
End: 2020-11-30

## 2020-11-30 ENCOUNTER — SPECIALTY PHARMACY (OUTPATIENT)
Dept: PHARMACY | Facility: HOSPITAL | Age: 55
End: 2020-11-30

## 2020-11-30 NOTE — TELEPHONE ENCOUNTER
Spoke with spouse and arrangements have been made for extra chemo pill when he is seen by Dr. Casey tomorrow.

## 2020-11-30 NOTE — TELEPHONE ENCOUNTER
Delia, patient's wife, calling to speak to Dr. Casey's nurse.  Patient was supposed to have radiation today.  She has a question about his chemo.  He took a chemo pill today.  They canceled radiation today due to the weather.      [phone]

## 2020-12-01 ENCOUNTER — HOSPITAL ENCOUNTER (OUTPATIENT)
Dept: RADIATION ONCOLOGY | Facility: HOSPITAL | Age: 55
Discharge: HOME | End: 2020-12-01

## 2020-12-01 ENCOUNTER — HOSPITAL ENCOUNTER (OUTPATIENT)
Dept: RADIATION ONCOLOGY | Facility: HOSPITAL | Age: 55
Setting detail: RADIATION/ONCOLOGY SERIES
Discharge: HOME | End: 2020-12-01

## 2020-12-01 ENCOUNTER — OFFICE VISIT (OUTPATIENT)
Dept: ONCOLOGY | Facility: CLINIC | Age: 55
End: 2020-12-01

## 2020-12-01 VITALS
WEIGHT: 224 LBS | TEMPERATURE: 98.4 F | DIASTOLIC BLOOD PRESSURE: 92 MMHG | BODY MASS INDEX: 34.06 KG/M2 | SYSTOLIC BLOOD PRESSURE: 150 MMHG | HEART RATE: 84 BPM | OXYGEN SATURATION: 96 %

## 2020-12-01 VITALS — WEIGHT: 218.3 LBS | BODY MASS INDEX: 33.19 KG/M2

## 2020-12-01 DIAGNOSIS — G93.89 BRAIN MASS: Primary | ICD-10-CM

## 2020-12-01 PROCEDURE — 77386: CPT | Performed by: RADIOLOGY

## 2020-12-01 PROCEDURE — 99214 OFFICE O/P EST MOD 30 MIN: CPT | Performed by: INTERNAL MEDICINE

## 2020-12-01 NOTE — PROGRESS NOTES
Name:  Kennedy Reardon  :  1965  Date:  2020     REFERRING PHYSICIAN  Gilbert Harrell MD    PRIMARY CARE PHYSICIAN  Feliberto Moore MD    REASON FOR FOLLOWUP  1. 5 X 3.8cm L temporal glioma s/p crani and excision 2020      CHIEF COMPLAINT  Mild, manageable, intermittent nausea with ongoing concomitant Temodar and XRT.    Dear Dr. Harrell,    HISTORY OF PRESENT ILLNESS:   I saw Mr. Reardon in follow up today in our medical oncology clinic. As you are aware, he is a pleasant, 55 y.o., white male with a history of minimal medical problems who was in his usual state of health until late 2020 when he suffered a couple of seizures a few days apart. He presented to our ED following the second episode and was med-flighted to [facility] due to concerns that he was suffering a stroke. Ultimately, an MRI of the brain identified a cystic mass in the left temporoparietal region; and you took him to the OR on 2020. The final, surgical pathology was consistent with glioblastoma multiforme. He recovered uneventfully from his craniotomy, and he was subsequently referred to our clinic for assistance with his management closer to his home in [place], [place].     INTERIM HISTORY:  Mr. Reardon returns to clinic today for follow up again accompanied by his wife. He is no longer as sensitive to light (he is not wearing sunglasses during today's office visit; he had to at the time of his initial consultation in late September). Since we first saw him in clinic, he has no nearly completed concomitant, daily PO Temodar and XRT (he has only today's and tomorrow's fractions remaining out of his thirty session schedule). Other than some mild, intermittent nausea, which has been manageable with prn antiemetics, he has tolerated this treatment regimen very well. He feels overall well and has no other specific complaints in clinic today. He is scheduled to follow up with [facility] neurosurgery  later this week with a repeat MRI of the brain.    Past Medical History:   Diagnosis Date   • Brain cancer (CMS/HCC)    • Hyperlipidemia    • Hypertension    • Medical history reviewed with no changes    • Seizures (CMS/HCC)        Past Surgical History:   Procedure Laterality Date   • CRANIOTOMY FOR TUMOR Left 8/28/2020    Procedure: CRANIOTOMY FOR TUMOR LEFT;  Surgeon: Gilbert Harrell MD;  Location: [facility];  Service: Neurosurgery;  Laterality: Left;       Social History     Socioeconomic History   • Marital status:      Spouse name: Not on file   • Number of children: Not on file   • Years of education: Not on file   • Highest education level: Not on file   Tobacco Use   • Smoking status: Current Every Day Smoker     Packs/day: 0.50   • Smokeless tobacco: Never Used   Substance and Sexual Activity   • Alcohol use: No     Comment: few beers every now and then       Family History   Problem Relation Age of Onset   • Osteoarthritis Mother    • Rheum arthritis Mother    • Hypertension Mother    • Cancer Father    • Osteoporosis Sister    • Osteoarthritis Maternal Grandfather    • Rheum arthritis Maternal Grandfather    • Heart disease Paternal Grandmother    • Cancer Paternal Grandfather    • Heart disease Paternal Grandfather    • Diabetes Paternal Grandfather        No Known Allergies    Current Outpatient Medications   Medication Sig Dispense Refill   • aspirin 81 MG chewable tablet Chew 81 mg Daily.     • atenolol (TENORMIN) 50 MG tablet Take 50 mg by mouth Daily.     • cetirizine (zyrTEC) 10 MG tablet Take 10 mg by mouth Daily.     • docusate sodium 100 MG capsule Take 1capsule by mouth 2 (Two) Times a Day As Needed for Constipation. 30 each 2   • levETIRAcetam (KEPPRA) 500 MG tablet Take 1 tablet by mouth Every 12 (Twelve) Hours. 180 tablet 4   • losartan (COZAAR) 50 MG tablet Take 50 mg by mouth Daily.     • lovastatin (MEVACOR) 20 MG tablet Take 20 mg by mouth 2 (two) times a day.     •  ondansetron (ZOFRAN) 4 MG tablet Take 1 tablet by mouth Every 8 (Eight) Hours As Needed for Nausea or Vomiting. 90 tablet 5   • prochlorperazine (COMPAZINE) 10 MG tablet Take 1 tablet by mouth Every 8 (Eight) Hours As Needed for Nausea or Vomiting. 90 tablet 5   • sulfamethoxazole-trimethoprim (BACTRIM DS,SEPTRA DS) 800-160 MG per tablet Take 1 tablet by mouth 3 (Three) Times a Week. Take 1 tablet on Mondays, Wednesdays and Fridays. 12 tablet 7   • temozolomide (Temodar) 140 MG chemo capsule Take 1 capsule by mouth Daily. With 20 mg capsule for 42 days with radiation 42 capsule 0   • temozolomide (Temodar) 20 MG chemo capsule Take 1 capsule by mouth Daily. with 140 mg capsule for 42 days with radiation 42 capsule 0     No current facility-administered medications for this visit.      REVIEW OF SYSTEMS  CONSTITUTIONAL:  No fever, chills or night sweats.  EYES:  No blurry vision, diplopia or other vision changes. Improved photosensitivity.  ENT:  No hearing loss, nosebleeds or sore throat.  CARDIOVASCULAR:  No palpitations, arrhythmia, syncopal episodes or edema.  PULMONARY:  No hemoptysis, wheezing, chronic cough or shortness of breath.  GASTROINTESTINAL:  No constipation or diarrhea. No abdominal pain. Mild, intermittent nausea with concomitant PO Temodar and brain XRT, as per the HPI above.  GENITOURINARY:  No hematuria, kidney stones or frequent urination.  MUSCULOSKELETAL:  No joint or back pains.  INTEGUMENTARY: No rashes or pruritus.  ENDOCRINE:  No excessive thirst or hot flashes.  HEMATOLOGIC:  No history of free bleeding, spontaneous bleeding or clotting.  IMMUNOLOGIC:  No allergies or frequent infections.  NEUROLOGIC: As per the HPI above.  PSYCHIATRIC:  No anxiety or depression.    PHYSICAL EXAMINATION  /92   Pulse 84   Temp 98.4 °F (36.9 °C)   Wt 102 kg (224 lb)   SpO2 96%   BMI 34.06 kg/m²     Pain Score:  Pain Score    12/01/20 0952   PainSc: 0-No pain       PHQ-Score Total:  PHQ-9 Total  Score:      GENERAL:  A well-developed, well-nourished, white male in no acute distress.  HEENT:  Pupils equally round reactive to light. Extraocular muscles intact.  CARDIOVASCULAR:  Regular rate and rhythm. No murmurs, gallops or rubs.  LUNGS:  Clear to auscultation bilaterally.  ABDOMEN:  Soft, nontender, nondistended with positive bowel sounds.  EXTREMITIES:  No clubbing, cyanosis or edema bilaterally.  SKIN:  No rashes or petechiae.  NEURO:  No focal deficits.  PSYCH:  Alert and oriented x3.    LABORATORY  Lab Results   Component Value Date    WBC 7.80 11/25/2020    HGB 15.0 11/25/2020    HCT 45.0 11/25/2020    MCV 92.8 11/25/2020     11/25/2020    NEUTROABS 4.96 11/25/2020       Lab Results   Component Value Date     11/25/2020    K 5.0 11/25/2020     11/25/2020    CO2 27.0 11/25/2020    BUN 8 11/25/2020    CREATININE 0.90 11/25/2020    GLUCOSE 115 (H) 11/25/2020    CALCIUM 9.6 11/25/2020    AST 28 11/25/2020    ALT 45 (H) 11/25/2020    ALKPHOS 96 11/25/2020    BILITOT 0.4 11/25/2020    PROTEINTOT 7.5 11/25/2020    ALBUMIN 4.60 11/25/2020     CBC (11/25/2020): WBCs: 7.8; HgB: 15.0; Hct: 45.0; platelets: 258    IMAGING  MRI brain with contrast (08/27/2020):  Impression: Large cystic mass identified in the left temporoparietal region with surgical markers in place for surgical planning. The largest axial dimension of the nodular component is 2.5 x 1.6 cm.    MRI brain with and without contrast (08/31/2020):  Impression: Postsurgical changes left temporal craniotomy and resection with expected early postoperative changes including dural enhancement mildly prominent along the left inferolateral margin of the resection site with attention on followup otherwise, no residual soft tissue nodular enhancement or mass-occupying focus of enhancement. No midline shift or hydrocephalus.    MRI brain with and without contrast (10/01/2020):  Impression: T2 signal abnormality surrounding a left temporal mass  measuring approximately 1.8 x 2.9 cm that shows predominantly peripheral enhancement. Some postsurgical changes noted overlying this region, but no prior examination is available for comparison.    PATHOLOGY  Temporal lobe mass (08/28/2020):  Glioblastoma multiforme. IDH1/IDH2 mutation not detected. 1p/19q co-deletion not detected. MGMT gene promotor methylation: Detected.    IMPRESSION AND PLAN  Mr. Reardon is a 55 y.o., white male with:  Glioblastoma multiforme: Diagnosed in late August 2020 following a couple of seizure events a few days apart and status post craniotomy with resection of a left, temporoparietal region, cystic mass on 08/27/2020. The final surgical pathology results are summarized above, and he recovered well from this procedure. I had a long discussion with the patient and his wife at the time of his initial consultation in our clinic (on 09/30/2020) regarding this diagnosis and its prognosis, reiterating much of what they were previously told by [facility] neurosurgery. They remain aware that his diagnosis is, unfortunately, ultimately very poor; however, this malignancy was, and remains, potentially treatable and sustained remissions are possible. Adjuvant, concomitant chemotherapy and radiation was recommended. He began a ~six-week course of this regimen (with temozolomide 75 mg/m2 daily with XRT; patient dose: 160 mg) by mid-October 2020. He has now completed all but two fractions (today's and tomorrow's) and has overall tolerated this therapy well, with some mild, manageable nausea as his only noticeable side effect. He is scheduled to follow up with [facility] neurosurgery with a repeat MRI of the brain again later this week, following the conclusion of his chemo/XRT. From that point, he would benefit from several weeks off; however, he is a good candidate to proceed with at least six months worth of adjuvant, PO Temodar alone (150 mg/m2 on days 1-5 of 28-day cycles; patient  dose: 320 mg). We will see him back in our clinic in one month with a repeat CBC and CMP, at which time this Rx will be provided. We will then see him in two months with a repeat CBC and CMP, the week prior to the start of the potential second cycle, with a CBC and CMP for a toxicity check. The patient and his wife were in agreement with these plans.    It is a pleasure to participate in Mr. Reardon's care. Please do not hesitate to call with any questions or concerns that you may have.    A total of 30 minutes were spent coordinating this patient’s care in clinic today; more than 50% of this time was face-to-face with the patient and his wife, reviewing his interim medical history, discussing the results of recent labwork and counseling on the current evaluation, treatment and followup plan. All questions were answered to their satisfaction.    FOLLOW UP  With [facility] radiation oncology, as planned, to complete concomitant Temodar/XRT (30th and final planned fraction scheduled for tomorrow, 12/2). With [facility] neurosurgery at the end of this week with a repeat MRI of the brain. Return to our clinic in 1 month with a CBC and CMP. Likely begin adjuvant, PO Temodar alone by the first of the following week. Return to our clinic in 2 months, just prior to the start of the potential 2nd cycle of qmonthly Temodar, with a CBC and CMP.            This document was electronically signed by KEON Casey MD December 1, 2020 10:08 EST      CC: MD Zena Talley MD James W. Killian, MD

## 2020-12-02 ENCOUNTER — HOSPITAL ENCOUNTER (OUTPATIENT)
Dept: RADIATION ONCOLOGY | Facility: HOSPITAL | Age: 55
Discharge: HOME | End: 2020-12-02

## 2020-12-02 PROCEDURE — 77386: CPT | Performed by: RADIOLOGY

## 2020-12-03 ENCOUNTER — HOSPITAL ENCOUNTER (OUTPATIENT)
Dept: MRI IMAGING | Facility: HOSPITAL | Age: 55
Discharge: HOME | End: 2020-12-03
Admitting: NEUROLOGICAL SURGERY

## 2020-12-03 ENCOUNTER — OFFICE VISIT (OUTPATIENT)
Dept: NEUROSURGERY | Facility: CLINIC | Age: 55
End: 2020-12-03

## 2020-12-03 VITALS
SYSTOLIC BLOOD PRESSURE: 134 MMHG | HEIGHT: 68 IN | BODY MASS INDEX: 32.89 KG/M2 | DIASTOLIC BLOOD PRESSURE: 72 MMHG | TEMPERATURE: 97.7 F | WEIGHT: 217 LBS

## 2020-12-03 DIAGNOSIS — C71.9 GLIOMA (HCC): ICD-10-CM

## 2020-12-03 DIAGNOSIS — C71.9 GLIOMA (HCC): Primary | ICD-10-CM

## 2020-12-03 PROCEDURE — 99024 POSTOP FOLLOW-UP VISIT: CPT | Performed by: NEUROLOGICAL SURGERY

## 2020-12-03 PROCEDURE — 70551 MRI BRAIN STEM W/O DYE: CPT | Performed by: RADIOLOGY

## 2020-12-03 PROCEDURE — 70551 MRI BRAIN STEM W/O DYE: CPT

## 2020-12-03 NOTE — PROGRESS NOTES
Kennedy Reardon  1965  [id number]                        CHIEF COMPLAINT: Left temporal glioma         MEDICAL HISTORY SINCE LAST ENCOUNTER: He reports today for follow-up.  He is doing quite well and showing significant neurological improvement.  Tolerating chemotherapy without issue.  MRI was performed earlier.           Past Medical History:   Diagnosis Date   • Brain cancer (CMS/HCC)    • Hyperlipidemia    • Hypertension    • Medical history reviewed with no changes    • Seizures (CMS/HCC)               Past Surgical History:   Procedure Laterality Date   • CRANIOTOMY FOR TUMOR Left 8/28/2020    Procedure: CRANIOTOMY FOR TUMOR LEFT;  Surgeon: Gilbert Harrell MD;  Location: [facility];  Service: Neurosurgery;  Laterality: Left;              Family History   Problem Relation Age of Onset   • Osteoarthritis Mother    • Rheum arthritis Mother    • Hypertension Mother    • Cancer Father    • Osteoporosis Sister    • Osteoarthritis Maternal Grandfather    • Rheum arthritis Maternal Grandfather    • Heart disease Paternal Grandmother    • Cancer Paternal Grandfather    • Heart disease Paternal Grandfather    • Diabetes Paternal Grandfather               Social History     Socioeconomic History   • Marital status:      Spouse name: Not on file   • Number of children: Not on file   • Years of education: Not on file   • Highest education level: Not on file   Tobacco Use   • Smoking status: Current Every Day Smoker     Packs/day: 0.50   • Smokeless tobacco: Never Used   Substance and Sexual Activity   • Alcohol use: No     Comment: few beers every now and then            No Known Allergies           Current Outpatient Medications:   •  aspirin 81 MG chewable tablet, Chew 81 mg Daily., Disp: , Rfl:   •  atenolol (TENORMIN) 50 MG tablet, Take 50 mg by mouth Daily., Disp: , Rfl:   •  cetirizine (zyrTEC) 10 MG tablet, Take 10 mg by mouth Daily., Disp: , Rfl:   •  docusate sodium 100 MG capsule, Take 1capsule by mouth  2 (Two) Times a Day As Needed for Constipation., Disp: 30 each, Rfl: 2  •  levETIRAcetam (KEPPRA) 500 MG tablet, Take 1 tablet by mouth Every 12 (Twelve) Hours., Disp: 180 tablet, Rfl: 4  •  losartan (COZAAR) 50 MG tablet, Take 50 mg by mouth Daily., Disp: , Rfl:   •  lovastatin (MEVACOR) 20 MG tablet, Take 20 mg by mouth 2 (two) times a day., Disp: , Rfl:   •  ondansetron (ZOFRAN) 4 MG tablet, Take 1 tablet by mouth Every 8 (Eight) Hours As Needed for Nausea or Vomiting., Disp: 90 tablet, Rfl: 5  •  prochlorperazine (COMPAZINE) 10 MG tablet, Take 1 tablet by mouth Every 8 (Eight) Hours As Needed for Nausea or Vomiting., Disp: 90 tablet, Rfl: 5  •  sulfamethoxazole-trimethoprim (BACTRIM DS,SEPTRA DS) 800-160 MG per tablet, Take 1 tablet by mouth 3 (Three) Times a Week. Take 1 tablet on Mondays, Wednesdays and Fridays., Disp: 12 tablet, Rfl: 7  •  temozolomide (Temodar) 140 MG chemo capsule, Take 1 capsule by mouth Daily. With 20 mg capsule for 42 days with radiation, Disp: 42 capsule, Rfl: 0  •  temozolomide (Temodar) 20 MG chemo capsule, Take 1 capsule by mouth Daily. with 140 mg capsule for 42 days with radiation, Disp: 42 capsule, Rfl: 0         Review of Systems   Constitutional: Negative for activity change, appetite change, chills, diaphoresis, fatigue, fever and unexpected weight change.   HENT: Negative for congestion, dental problem, drooling, ear discharge, ear pain, facial swelling, hearing loss, mouth sores, nosebleeds, postnasal drip, rhinorrhea, sinus pressure, sneezing, sore throat, tinnitus, trouble swallowing and voice change.    Eyes: Negative for photophobia, pain, discharge, redness, itching and visual disturbance.   Respiratory: Negative for apnea, cough, choking, chest tightness, shortness of breath, wheezing and stridor.    Cardiovascular: Negative for chest pain, palpitations and leg swelling.   Gastrointestinal: Negative for abdominal distention, abdominal pain, anal bleeding, blood in  "stool, constipation, diarrhea, nausea, rectal pain and vomiting.   Endocrine: Negative for cold intolerance, heat intolerance, polydipsia, polyphagia and polyuria.   Genitourinary: Negative for decreased urine volume, difficulty urinating, dysuria, enuresis, flank pain, frequency, genital sores, hematuria and urgency.   Musculoskeletal: Negative for arthralgias, back pain, gait problem, joint swelling, myalgias, neck pain and neck stiffness.   Skin: Negative for color change, pallor, rash and wound.   Allergic/Immunologic: Negative for environmental allergies, food allergies and immunocompromised state.   Neurological: Negative for dizziness, tremors, seizures, syncope, facial asymmetry, speech difficulty, weakness, light-headedness, numbness and headaches.   Hematological: Negative for adenopathy. Does not bruise/bleed easily.   Psychiatric/Behavioral: Negative for agitation, behavioral problems, confusion, decreased concentration, dysphoric mood, hallucinations, self-injury, sleep disturbance and suicidal ideas. The patient is not nervous/anxious and is not hyperactive.    All other systems reviewed and are negative.              Vitals:    12/03/20 1410   BP: 134/72   BP Location: Right arm   Patient Position: Sitting   Cuff Size: Adult   Temp: 97.7 °F (36.5 °C)   TempSrc: Infrared   Weight: 98.4 kg (217 lb)   Height: 172.7 cm (68\")               EXAMINATION: Speech markedly improved.  No overt weakness or sensory loss            MEDICAL DECISION MAKING: MRI shows improvement with decreased FLAIR/T1           ASSESSMENT/DISPOSITION: He will return to see us in 8 weeks with repeated MRI with and without contrast.  Thus far he is done well ...              I APPRECIATE THE OPPORTUNITY OF THIS REFERRAL. PLEASE CALL IF ANY       QUESTIONS [phone]          "

## 2020-12-18 ENCOUNTER — TELEPHONE (OUTPATIENT)
Dept: ONCOLOGY | Facility: CLINIC | Age: 55
End: 2020-12-18

## 2020-12-18 NOTE — RADIATION COMPLETION NOTES
DATE OF COMPLETION: 12/2/20  DIAGNOSIS: GBM of the left temporal lobe of the brain status post resection: IDH wild-type, tert mutant, 1P 19 Q non-co-deleted, MGM T methylated  REFERRING:  Gilbert Harrell MD, Reg Casey        Dear Doctors,   Kennedy Reardon completed radiation therapy today.      BACKGROUND: Kennedy Reardon   is a very pleasant 55 y.o. male  from [facility] who developed seizures was taken to the ER where he had an MRI on 8/26/2020 that showed a cystic left temporal lobe mass measuring about 5 cm in size.  Patient did have some mild midline shift at that time.  The patient was then taken for surgical section by Dr. Harrell on 8/29/2020.  Patient had a postop MRI of the brain on 8/31/2020 that showed a good resection with expected postop changes.  Patient pathology was consistent with a GBM.  The patient was then allowed to heal adequately and saw Dr. Casey with medical oncology in [place] who plan for the patient to have chemotherapy with Temodar and recommended concurrent radiation.  The patient saw Dr. Harrell on 10-1-20 who feels that the patient is healed adequately to begin radiation treatments.  Patient had MRI of the brain at that time that redemonstrated the patient's resection cavity and 2.9 cm area of enhancement in the left temporal lobe.  He was referred for temodar and XRT as below.    Treatment Summary     Dates of Therapy: 10/19/2020-12/2/2020  Treatment Site: Left temporal lobe  Dose: 60 Gy in 30 fractions of 2 Gy each  Technique: Helical Horace therapy and 6 6 MV photons with a mask for immobilization    Treatment Course and Tolerance: Mr. Reardon tolerated treatment well.  He required no treatment breaks due to toxicity.    The initial follow up visit will be in 1 month.    Kennedy Reardon knows to call if any problems or concerns develop in the meantime.     Electronically signed by: Liam Herzog MD                    Cc: Feliberto Moore MD

## 2020-12-18 NOTE — TELEPHONE ENCOUNTER
Patient's wife, Delia, calling.    Patient finished antibiotic today that was prescribed by Dr. Casey.    She needs to know if he is to continue taking it.    Please call Delia at- [phone]

## 2020-12-18 NOTE — TELEPHONE ENCOUNTER
Pts spouse Delia aware to continue Bactrim until OV w/ORTIZ Musa on 1/4/2021, labs and follow up on that day. Pts wife states radiation completed approx 3 weeks ago. Pts spouse verbalized understanding.

## 2020-12-29 ENCOUNTER — SPECIALTY PHARMACY (OUTPATIENT)
Dept: PHARMACY | Facility: HOSPITAL | Age: 55
End: 2020-12-29

## 2020-12-29 DIAGNOSIS — G93.89 BRAIN MASS: Primary | ICD-10-CM

## 2020-12-29 RX ORDER — TEMOZOLOMIDE 180 MG/1
180 CAPSULE ORAL DAILY
Qty: 5 CAPSULE | Refills: 0 | Status: SHIPPED | OUTPATIENT
Start: 2021-01-03 | End: 2021-02-03 | Stop reason: SDUPTHER

## 2020-12-29 RX ORDER — TEMOZOLOMIDE 140 MG/1
140 CAPSULE ORAL DAILY
Qty: 5 CAPSULE | Refills: 0 | Status: SHIPPED | OUTPATIENT
Start: 2021-01-03 | End: 2021-02-03 | Stop reason: SDUPTHER

## 2020-12-30 ENCOUNTER — SPECIALTY PHARMACY (OUTPATIENT)
Dept: PHARMACY | Facility: HOSPITAL | Age: 55
End: 2020-12-30

## 2021-01-01 ENCOUNTER — SPECIALTY PHARMACY (OUTPATIENT)
Dept: PHARMACY | Facility: TELEHEALTH | Age: 56
End: 2021-01-01

## 2021-01-01 ENCOUNTER — LAB (OUTPATIENT)
Dept: ONCOLOGY | Facility: CLINIC | Age: 56
End: 2021-01-01

## 2021-01-01 ENCOUNTER — HOSPITAL ENCOUNTER (OUTPATIENT)
Dept: MRI IMAGING | Facility: HOSPITAL | Age: 56
Discharge: HOME | End: 2021-12-02
Admitting: PHYSICIAN ASSISTANT

## 2021-01-01 ENCOUNTER — OFFICE VISIT (OUTPATIENT)
Dept: NEUROSURGERY | Facility: CLINIC | Age: 56
End: 2021-01-01

## 2021-01-01 ENCOUNTER — APPOINTMENT (OUTPATIENT)
Dept: MRI IMAGING | Facility: HOSPITAL | Age: 56
End: 2021-01-01

## 2021-01-01 ENCOUNTER — SPECIALTY PHARMACY (OUTPATIENT)
Dept: PHARMACY | Facility: HOSPITAL | Age: 56
End: 2021-01-01

## 2021-01-01 VITALS
TEMPERATURE: 98.8 F | WEIGHT: 234 LBS | DIASTOLIC BLOOD PRESSURE: 78 MMHG | SYSTOLIC BLOOD PRESSURE: 126 MMHG | OXYGEN SATURATION: 100 % | BODY MASS INDEX: 35.46 KG/M2 | RESPIRATION RATE: 22 BRPM | HEIGHT: 68 IN | HEART RATE: 62 BPM

## 2021-01-01 VITALS
OXYGEN SATURATION: 98 % | SYSTOLIC BLOOD PRESSURE: 138 MMHG | RESPIRATION RATE: 18 BRPM | DIASTOLIC BLOOD PRESSURE: 87 MMHG | TEMPERATURE: 97.3 F | HEART RATE: 71 BPM

## 2021-01-01 DIAGNOSIS — C71.9 GLIOMA (HCC): ICD-10-CM

## 2021-01-01 DIAGNOSIS — Z92.3 HISTORY OF RADIATION THERAPY: ICD-10-CM

## 2021-01-01 DIAGNOSIS — C71.9 GLIOMA (HCC): Primary | ICD-10-CM

## 2021-01-01 LAB
ALBUMIN SERPL-MCNC: 4.11 G/DL (ref 3.5–5.2)
ALBUMIN/GLOB SERPL: 1.4 G/DL
ALP SERPL-CCNC: 86 U/L (ref 39–117)
ALT SERPL W P-5'-P-CCNC: 33 U/L (ref 1–41)
ANION GAP SERPL CALCULATED.3IONS-SCNC: 7.7 MMOL/L (ref 5–15)
AST SERPL-CCNC: 21 U/L (ref 1–40)
BASOPHILS # BLD AUTO: 0.06 10*3/MM3 (ref 0–0.2)
BASOPHILS NFR BLD AUTO: 1 % (ref 0–1.5)
BILIRUB SERPL-MCNC: 0.3 MG/DL (ref 0–1.2)
BUN SERPL-MCNC: 11 MG/DL (ref 6–20)
BUN/CREAT SERPL: 14.9 (ref 7–25)
CALCIUM SPEC-SCNC: 9.1 MG/DL (ref 8.6–10.5)
CHLORIDE SERPL-SCNC: 104 MMOL/L (ref 98–107)
CO2 SERPL-SCNC: 25.3 MMOL/L (ref 22–29)
CREAT SERPL-MCNC: 0.74 MG/DL (ref 0.76–1.27)
DEPRECATED RDW RBC AUTO: 42.5 FL (ref 37–54)
EOSINOPHIL # BLD AUTO: 0.15 10*3/MM3 (ref 0–0.4)
EOSINOPHIL NFR BLD AUTO: 2.4 % (ref 0.3–6.2)
ERYTHROCYTE [DISTWIDTH] IN BLOOD BY AUTOMATED COUNT: 12.4 % (ref 12.3–15.4)
GFR SERPL CREATININE-BSD FRML MDRD: 110 ML/MIN/1.73
GLOBULIN UR ELPH-MCNC: 2.9 GM/DL
GLUCOSE SERPL-MCNC: 128 MG/DL (ref 65–99)
HCT VFR BLD AUTO: 43.6 % (ref 37.5–51)
HGB BLD-MCNC: 14.4 G/DL (ref 13–17.7)
IMM GRANULOCYTES # BLD AUTO: 0.02 10*3/MM3 (ref 0–0.05)
IMM GRANULOCYTES NFR BLD AUTO: 0.3 % (ref 0–0.5)
LYMPHOCYTES # BLD AUTO: 1.35 10*3/MM3 (ref 0.7–3.1)
LYMPHOCYTES NFR BLD AUTO: 21.7 % (ref 19.6–45.3)
MCH RBC QN AUTO: 30.8 PG (ref 26.6–33)
MCHC RBC AUTO-ENTMCNC: 33 G/DL (ref 31.5–35.7)
MCV RBC AUTO: 93.2 FL (ref 79–97)
MONOCYTES # BLD AUTO: 0.44 10*3/MM3 (ref 0.1–0.9)
MONOCYTES NFR BLD AUTO: 7.1 % (ref 5–12)
NEUTROPHILS NFR BLD AUTO: 4.2 10*3/MM3 (ref 1.7–7)
NEUTROPHILS NFR BLD AUTO: 67.5 % (ref 42.7–76)
NRBC BLD AUTO-RTO: 0 /100 WBC (ref 0–0.2)
PLATELET # BLD AUTO: 213 10*3/MM3 (ref 140–450)
PMV BLD AUTO: 9.4 FL (ref 6–12)
POTASSIUM SERPL-SCNC: 4.7 MMOL/L (ref 3.5–5.2)
PROT SERPL-MCNC: 7 G/DL (ref 6–8.5)
RBC # BLD AUTO: 4.68 10*6/MM3 (ref 4.14–5.8)
SODIUM SERPL-SCNC: 137 MMOL/L (ref 136–145)
WBC NRBC COR # BLD: 6.22 10*3/MM3 (ref 3.4–10.8)

## 2021-01-01 PROCEDURE — 80053 COMPREHEN METABOLIC PANEL: CPT | Performed by: INTERNAL MEDICINE

## 2021-01-01 PROCEDURE — 99214 OFFICE O/P EST MOD 30 MIN: CPT | Performed by: PHYSICIAN ASSISTANT

## 2021-01-01 PROCEDURE — 85025 COMPLETE CBC W/AUTO DIFF WBC: CPT | Performed by: INTERNAL MEDICINE

## 2021-01-01 PROCEDURE — 0 GADOBENATE DIMEGLUMINE 529 MG/ML SOLUTION: Performed by: PHYSICIAN ASSISTANT

## 2021-01-01 PROCEDURE — 70553 MRI BRAIN STEM W/O & W/DYE: CPT

## 2021-01-01 PROCEDURE — 70553 MRI BRAIN STEM W/O & W/DYE: CPT | Performed by: RADIOLOGY

## 2021-01-01 PROCEDURE — A9577 INJ MULTIHANCE: HCPCS | Performed by: PHYSICIAN ASSISTANT

## 2021-01-01 RX ADMIN — GADOBENATE DIMEGLUMINE 20 ML: 529 INJECTION, SOLUTION INTRAVENOUS at 08:44

## 2021-01-04 ENCOUNTER — OFFICE VISIT (OUTPATIENT)
Dept: ONCOLOGY | Facility: CLINIC | Age: 56
End: 2021-01-04

## 2021-01-04 ENCOUNTER — LAB (OUTPATIENT)
Dept: ONCOLOGY | Facility: CLINIC | Age: 56
End: 2021-01-04

## 2021-01-04 VITALS
HEART RATE: 66 BPM | WEIGHT: 223.4 LBS | SYSTOLIC BLOOD PRESSURE: 136 MMHG | TEMPERATURE: 97.7 F | OXYGEN SATURATION: 97 % | RESPIRATION RATE: 18 BRPM | BODY MASS INDEX: 33.97 KG/M2 | DIASTOLIC BLOOD PRESSURE: 75 MMHG

## 2021-01-04 DIAGNOSIS — G93.89 BRAIN MASS: ICD-10-CM

## 2021-01-04 DIAGNOSIS — G93.89 BRAIN MASS: Primary | ICD-10-CM

## 2021-01-04 LAB
ALBUMIN SERPL-MCNC: 4.03 G/DL (ref 3.5–5.2)
ALBUMIN/GLOB SERPL: 1.4 G/DL
ALP SERPL-CCNC: 92 U/L (ref 39–117)
ALT SERPL W P-5'-P-CCNC: 28 U/L (ref 1–41)
ANION GAP SERPL CALCULATED.3IONS-SCNC: 5.3 MMOL/L (ref 5–15)
AST SERPL-CCNC: 18 U/L (ref 1–40)
BASOPHILS # BLD AUTO: 0.05 10*3/MM3 (ref 0–0.2)
BASOPHILS NFR BLD AUTO: 0.7 % (ref 0–1.5)
BILIRUB SERPL-MCNC: 0.3 MG/DL (ref 0–1.2)
BUN SERPL-MCNC: 6 MG/DL (ref 6–20)
BUN/CREAT SERPL: 7 (ref 7–25)
CALCIUM SPEC-SCNC: 9.3 MG/DL (ref 8.6–10.5)
CHLORIDE SERPL-SCNC: 102 MMOL/L (ref 98–107)
CO2 SERPL-SCNC: 26.7 MMOL/L (ref 22–29)
CREAT SERPL-MCNC: 0.86 MG/DL (ref 0.76–1.27)
DEPRECATED RDW RBC AUTO: 41.9 FL (ref 37–54)
EOSINOPHIL # BLD AUTO: 0.42 10*3/MM3 (ref 0–0.4)
EOSINOPHIL NFR BLD AUTO: 5.6 % (ref 0.3–6.2)
ERYTHROCYTE [DISTWIDTH] IN BLOOD BY AUTOMATED COUNT: 12.2 % (ref 12.3–15.4)
GFR SERPL CREATININE-BSD FRML MDRD: 93 ML/MIN/1.73
GLOBULIN UR ELPH-MCNC: 3 GM/DL
GLUCOSE SERPL-MCNC: 114 MG/DL (ref 65–99)
HCT VFR BLD AUTO: 45.5 % (ref 37.5–51)
HGB BLD-MCNC: 15 G/DL (ref 13–17.7)
IMM GRANULOCYTES # BLD AUTO: 0.01 10*3/MM3 (ref 0–0.05)
IMM GRANULOCYTES NFR BLD AUTO: 0.1 % (ref 0–0.5)
LYMPHOCYTES # BLD AUTO: 1.88 10*3/MM3 (ref 0.7–3.1)
LYMPHOCYTES NFR BLD AUTO: 25.3 % (ref 19.6–45.3)
MCH RBC QN AUTO: 30.7 PG (ref 26.6–33)
MCHC RBC AUTO-ENTMCNC: 33 G/DL (ref 31.5–35.7)
MCV RBC AUTO: 93 FL (ref 79–97)
MONOCYTES # BLD AUTO: 0.69 10*3/MM3 (ref 0.1–0.9)
MONOCYTES NFR BLD AUTO: 9.3 % (ref 5–12)
NEUTROPHILS NFR BLD AUTO: 4.39 10*3/MM3 (ref 1.7–7)
NEUTROPHILS NFR BLD AUTO: 59 % (ref 42.7–76)
NRBC BLD AUTO-RTO: 0 /100 WBC (ref 0–0.2)
PLATELET # BLD AUTO: 182 10*3/MM3 (ref 140–450)
PMV BLD AUTO: 9.2 FL (ref 6–12)
POTASSIUM SERPL-SCNC: 4.6 MMOL/L (ref 3.5–5.2)
PROT SERPL-MCNC: 7 G/DL (ref 6–8.5)
RBC # BLD AUTO: 4.89 10*6/MM3 (ref 4.14–5.8)
SODIUM SERPL-SCNC: 134 MMOL/L (ref 136–145)
WBC # BLD AUTO: 7.44 10*3/MM3 (ref 3.4–10.8)

## 2021-01-04 PROCEDURE — 90471 IMMUNIZATION ADMIN: CPT | Performed by: NURSE PRACTITIONER

## 2021-01-04 PROCEDURE — 90694 VACC AIIV4 NO PRSRV 0.5ML IM: CPT | Performed by: NURSE PRACTITIONER

## 2021-01-04 PROCEDURE — 80053 COMPREHEN METABOLIC PANEL: CPT | Performed by: INTERNAL MEDICINE

## 2021-01-04 PROCEDURE — 99214 OFFICE O/P EST MOD 30 MIN: CPT | Performed by: NURSE PRACTITIONER

## 2021-01-04 PROCEDURE — 85025 COMPLETE CBC W/AUTO DIFF WBC: CPT | Performed by: INTERNAL MEDICINE

## 2021-01-04 NOTE — PROGRESS NOTES
Name:  Kennedy Reardon  :  1965  Date:  2021     REFERRING PHYSICIAN  Gilbert Harrell MD    PRIMARY CARE PHYSICIAN  Feliberto Moore MD    REASON FOR FOLLOWUP  1. 5 X 3.8cm L temporal glioma s/p crani and excision 2020      CHIEF COMPLAINT  None.    Dear Dr. Harrell,    HISTORY OF PRESENT ILLNESS:   I saw Mr. Reardon in follow up today in our medical oncology clinic. As you are aware, he is a pleasant, 55 y.o., white male with a history of minimal medical problems who was in his usual state of health until late 2020 when he suffered a couple of seizures a few days apart. He presented to our ED following the second episode and was med-flighted to [facility] due to concerns that he was suffering a stroke. Ultimately, an MRI of the brain identified a cystic mass in the left temporoparietal region; and you took him to the OR on 2020. The final, surgical pathology was consistent with glioblastoma multiforme. He recovered uneventfully from his craniotomy, and he was subsequently referred to our clinic for assistance with his management closer to his home in [place], [place].     INTERIM HISTORY:  Mr. Reardon presents today for follow up accompanied by his wife. Overall, he reports that he has been doing well since his last presentation. He completed concomitant, daily PO Temodar and XRT on 2020 and reports that he tolerated this well, with mild nausea as his only reported noticeable side effects. He reports a good appetite and hydrating well. Denies nausea/vomiting or constipation/diarrhea. He denies any other specific complaints today.       Past Medical History:   Diagnosis Date   • Brain cancer (CMS/HCC)    • Hyperlipidemia    • Hypertension    • Medical history reviewed with no changes    • Seizures (CMS/HCC)        Past Surgical History:   Procedure Laterality Date   • CRANIOTOMY FOR TUMOR Left 2020    Procedure: CRANIOTOMY FOR TUMOR LEFT;  Surgeon: Gilbert Harrell  MD KASH;  Location: [facility];  Service: Neurosurgery;  Laterality: Left;       Social History     Socioeconomic History   • Marital status:      Spouse name: Not on file   • Number of children: Not on file   • Years of education: Not on file   • Highest education level: Not on file   Tobacco Use   • Smoking status: Current Every Day Smoker     Packs/day: 0.50   • Smokeless tobacco: Never Used   Substance and Sexual Activity   • Alcohol use: No     Comment: few beers every now and then       Family History   Problem Relation Age of Onset   • Osteoarthritis Mother    • Rheum arthritis Mother    • Hypertension Mother    • Cancer Father    • Osteoporosis Sister    • Osteoarthritis Maternal Grandfather    • Rheum arthritis Maternal Grandfather    • Heart disease Paternal Grandmother    • Cancer Paternal Grandfather    • Heart disease Paternal Grandfather    • Diabetes Paternal Grandfather        No Known Allergies     .ECOG score: 0               Current Outpatient Medications   Medication Sig Dispense Refill   • aspirin 81 MG chewable tablet Chew 81 mg Daily.     • atenolol (TENORMIN) 50 MG tablet Take 50 mg by mouth Daily.     • cetirizine (zyrTEC) 10 MG tablet Take 10 mg by mouth Daily.     • docusate sodium 100 MG capsule Take 1capsule by mouth 2 (Two) Times a Day As Needed for Constipation. 30 each 2   • levETIRAcetam (KEPPRA) 500 MG tablet Take 1 tablet by mouth Every 12 (Twelve) Hours. 180 tablet 4   • losartan (COZAAR) 50 MG tablet Take 50 mg by mouth Daily.     • lovastatin (MEVACOR) 20 MG tablet Take 20 mg by mouth 2 (two) times a day.     • ondansetron (ZOFRAN) 4 MG tablet Take 1 tablet by mouth Every 8 (Eight) Hours As Needed for Nausea or Vomiting. 90 tablet 5   • prochlorperazine (COMPAZINE) 10 MG tablet Take 1 tablet by mouth Every 8 (Eight) Hours As Needed for Nausea or Vomiting. 90 tablet 5   • sulfamethoxazole-trimethoprim (BACTRIM DS,SEPTRA DS) 800-160 MG per tablet Take 1 tablet by mouth 3  (Three) Times a Week. Take 1 tablet on Mondays, Wednesdays and Fridays. 12 tablet 7   • temozolomide (TEMODAR) 140 MG chemo capsule Take 1 capsule by mouth with 1 other temozolomide prescription for 320 mg total Daily for 5 days. Take 140 mg + 180 mg capsule on days 1-5 of each cycle. 5 capsule 0   • temozolomide (TEMODAR) 180 MG chemo capsule Take 1 capsule by mouth with 1 other temozolomide prescription for 320 mg total Daily for 5 days. Take 140 mg + 180 mg capsule on days 1-5 of each cycle. 5 capsule 0     No current facility-administered medications for this visit.      REVIEW OF SYSTEMS  CONSTITUTIONAL:  No fever, chills or night sweats.  EYES:  No blurry vision, diplopia or other vision changes. Improved photosensitivity.  ENT:  No hearing loss, nosebleeds or sore throat.  CARDIOVASCULAR:  No palpitations, arrhythmia, syncopal episodes or edema.  PULMONARY:  No hemoptysis, wheezing, chronic cough or shortness of breath.  GASTROINTESTINAL:  No constipation or diarrhea. No abdominal pain. Mild, intermittent nausea with concomitant PO Temodar and brain XRT, now resolved, as per the HPI above.  GENITOURINARY:  No hematuria, kidney stones or frequent urination.  MUSCULOSKELETAL:  No joint or back pains.  INTEGUMENTARY: No rashes or pruritus.  ENDOCRINE:  No excessive thirst or hot flashes.  HEMATOLOGIC:  No history of free bleeding, spontaneous bleeding or clotting.  IMMUNOLOGIC:  No allergies or frequent infections.  NEUROLOGIC: As per the HPI above.  PSYCHIATRIC:  No anxiety or depression.    PHYSICAL EXAMINATION  /75   Pulse 66   Temp 97.7 °F (36.5 °C) (Temporal)   Resp 18   Wt 101 kg (223 lb 6.4 oz)   SpO2 97%   BMI 33.97 kg/m²     Pain Score:  Pain Score    01/04/21 0955   PainSc: 0-No pain       PHQ-Score Total:  PHQ-9 Total Score:      GENERAL:  A well-developed, well-nourished, white male in no acute distress.  HEENT:  Pupils equally round reactive to light. Extraocular muscles  intact.  CARDIOVASCULAR:  Regular rate and rhythm. No murmurs, gallops or rubs.  LUNGS:  Clear to auscultation bilaterally.  ABDOMEN:  Soft, nontender, nondistended with positive bowel sounds.  EXTREMITIES:  No clubbing, cyanosis or edema bilaterally.  SKIN:  No rashes or petechiae.  NEURO:  No focal deficits.  PSYCH:  Alert and oriented x3.    LABORATORY  Lab Results   Component Value Date    WBC 7.44 01/04/2021    HGB 15.0 01/04/2021    HCT 45.5 01/04/2021    MCV 93.0 01/04/2021     01/04/2021    NEUTROABS 4.39 01/04/2021       Lab Results   Component Value Date     (L) 01/04/2021    K 4.6 01/04/2021     01/04/2021    CO2 26.7 01/04/2021    BUN 6 01/04/2021    CREATININE 0.86 01/04/2021    GLUCOSE 114 (H) 01/04/2021    CALCIUM 9.3 01/04/2021    AST 18 01/04/2021    ALT 28 01/04/2021    ALKPHOS 92 01/04/2021    BILITOT 0.3 01/04/2021    PROTEINTOT 7.0 01/04/2021    ALBUMIN 4.03 01/04/2021     CBC (11/25/2020): WBCs: 7.8; HgB: 15.0; Hct: 45.0; platelets: 258    IMAGING  MRI brain with contrast (08/27/2020):  Impression: Large cystic mass identified in the left temporoparietal region with surgical markers in place for surgical planning. The largest axial dimension of the nodular component is 2.5 x 1.6 cm.    MRI brain with and without contrast (08/31/2020):  Impression: Postsurgical changes left temporal craniotomy and resection with expected early postoperative changes including dural enhancement mildly prominent along the left inferolateral margin of the resection site with attention on followup otherwise, no residual soft tissue nodular enhancement or mass-occupying focus of enhancement. No midline shift or hydrocephalus.    MRI brain with and without contrast (10/01/2020):  Impression: T2 signal abnormality surrounding a left temporal mass measuring approximately 1.8 x 2.9 cm that shows predominantly peripheral enhancement. Some postsurgical changes noted overlying this region, but no prior  examination is available for comparison.    MRI brain without contrast (12/03/2020):  IMPRESSION: The left temporal lobe lesion is slightly smaller now with several cystic areas in the lesion. There is less edema than on the previous exam in 10/2020.     PATHOLOGY  Temporal lobe mass (08/28/2020):  Glioblastoma multiforme. IDH1/IDH2 mutation not detected. 1p/19q co-deletion not detected. MGMT gene promotor methylation: Detected.    IMPRESSION AND PLAN  Mr. Reardon is a 55 y.o., white male with:  Glioblastoma multiforme: Diagnosed in late August 2020 following a couple of seizure events a few days apart and status post craniotomy with resection of a left, temporoparietal region, cystic mass on 08/27/2020. The final surgical pathology results are summarized above, and he recovered well from this procedure. Dr. Casey had a long discussion with the patient and his wife at the time of his initial consultation in our clinic (on 09/30/2020) regarding this diagnosis and its prognosis, reiterating much of what they were previously told by [facility] neurosurgery. They remain aware that his diagnosis is, unfortunately, ultimately very poor; however, this malignancy was, and remains, potentially treatable and sustained remissions are possible. Adjuvant, concomitant chemotherapy and radiation was recommended. He began a ~six-week course of this regimen (with temozolomide 75 mg/m2 daily with XRT; patient dose: 160 mg) by mid-October 2020. He has now completed concomitant PO Temodar and XRT and overall tolerated this therapy well, with some mild, msanageable nausea as his only noticeable side effect. He had repeat MRI with [facility] neurosurgery on 12/03/2020 following the conclusion of his chemo/XRT, which showed improvement (summarized above). He will follow up with [facility] neurosurgery in 8 weeks with repeat MRI. He is a good candidate to proceed with at least six months worth of adjuvant, PO Temodar  alone (150 mg/m2 on days 1-5 of 28-day cycles; patient dose: 320 mg). He is agreeable to start PO Temodar alone, in one week on 01/11/2021, RX provided today. We will see him in one month with a repeat CBC and CMP, the week prior to the start of the potential second cycle, with a CBC and CMP for a toxicity check. The patient and his wife were in agreement with these plans.    ACO / JEM/Other  Quality measures  -  Kennedy Reardon did not receive 2020 flu vaccine. This was given in clinic today.  -  Kennedy Reardon reports a pain score of 0.    -  Current outpatient and discharge medications have been reconciled for the patient.  Reviewed by: ORTIZ Valadez    It is a pleasure to participate in Mr. Reardon's care. Please do not hesitate to call with any questions or concerns that you may have.    A total of 30 minutes were spent coordinating this patient’s care in clinic today; more than 50% of this time was face-to-face with the patient and his wife, reviewing his interim medical history, discussing the results of recent labwork and counseling on the current evaluation, treatment and followup plan. All questions were answered to their satisfaction.    FOLLOW UP  Will begin adjuvant, PO Temodar alone in one week on 01/11/2021. Return to our clinic in 1 month, just prior to the start of the potential 2nd cycle of qmonthly Temodar, with a CBC and CMP. With [facility] neurosurgery as planned with repeat MRI.            This document was electronically signed by ORTIZ Lopez January 4, 2021 10:30 EST      CC: MD Zena Talley MD James W. Killian, MD

## 2021-01-05 NOTE — PROGRESS NOTES
Specialty Pharmacy Note      Name:  Kennedy Reardon  :  1965  Date: 2020       Past Medical History:   Diagnosis Date   • Brain cancer (CMS/HCC)    • Hyperlipidemia    • Hypertension    • Medical history reviewed with no changes    • Seizures (CMS/HCC)        Past Surgical History:   Procedure Laterality Date   • CRANIOTOMY FOR TUMOR Left 2020    Procedure: CRANIOTOMY FOR TUMOR LEFT;  Surgeon: Gilbert Harrell MD;  Location: [facility];  Service: Neurosurgery;  Laterality: Left;       Social History     Socioeconomic History   • Marital status:      Spouse name: Not on file   • Number of children: Not on file   • Years of education: Not on file   • Highest education level: Not on file   Tobacco Use   • Smoking status: Current Every Day Smoker     Packs/day: 0.50   • Smokeless tobacco: Never Used   Substance and Sexual Activity   • Alcohol use: No     Comment: few beers every now and then       Family History   Problem Relation Age of Onset   • Osteoarthritis Mother    • Rheum arthritis Mother    • Hypertension Mother    • Cancer Father    • Osteoporosis Sister    • Osteoarthritis Maternal Grandfather    • Rheum arthritis Maternal Grandfather    • Heart disease Paternal Grandmother    • Cancer Paternal Grandfather    • Heart disease Paternal Grandfather    • Diabetes Paternal Grandfather        No Known Allergies    Current Outpatient Medications   Medication Sig Dispense Refill   • aspirin 81 MG chewable tablet Chew 81 mg Daily.     • atenolol (TENORMIN) 50 MG tablet Take 50 mg by mouth Daily.     • cetirizine (zyrTEC) 10 MG tablet Take 10 mg by mouth Daily.     • docusate sodium 100 MG capsule Take 1capsule by mouth 2 (Two) Times a Day As Needed for Constipation. 30 each 2   • levETIRAcetam (KEPPRA) 500 MG tablet Take 1 tablet by mouth Every 12 (Twelve) Hours. 180 tablet 4   • losartan (COZAAR) 50 MG tablet Take 50 mg by mouth Daily.     • lovastatin (MEVACOR) 20 MG tablet Take 20 mg by  mouth 2 (two) times a day.     • ondansetron (ZOFRAN) 4 MG tablet Take 1 tablet by mouth Every 8 (Eight) Hours As Needed for Nausea or Vomiting. 90 tablet 5   • prochlorperazine (COMPAZINE) 10 MG tablet Take 1 tablet by mouth Every 8 (Eight) Hours As Needed for Nausea or Vomiting. 90 tablet 5   • sulfamethoxazole-trimethoprim (BACTRIM DS,SEPTRA DS) 800-160 MG per tablet Take 1 tablet by mouth 3 (Three) Times a Week. Take 1 tablet on Mondays, Wednesdays and Fridays. 12 tablet 7   • temozolomide (TEMODAR) 140 MG chemo capsule Take 1 capsule by mouth with 1 other temozolomide prescription for 320 mg total Daily for 5 days. Take 140 mg + 180 mg capsule on days 1-5 of each cycle. 5 capsule 0   • temozolomide (TEMODAR) 180 MG chemo capsule Take 1 capsule by mouth with 1 other temozolomide prescription for 320 mg total Daily for 5 days. Take 140 mg + 180 mg capsule on days 1-5 of each cycle. 5 capsule 0     No current facility-administered medications for this visit.          LABORATORY:    Lab Results   Component Value Date    WBC 7.44 01/04/2021    HGB 15.0 01/04/2021    HCT 45.5 01/04/2021    MCV 93.0 01/04/2021    RDW 12.2 (L) 01/04/2021     01/04/2021    NEUTRORELPCT 59.0 01/04/2021    LYMPHORELPCT 25.3 01/04/2021    MONORELPCT 9.3 01/04/2021    EOSRELPCT 5.6 01/04/2021    BASORELPCT 0.7 01/04/2021    NEUTROABS 4.39 01/04/2021    LYMPHSABS 1.88 01/04/2021       Lab Results   Component Value Date     (L) 01/04/2021    K 4.6 01/04/2021    CO2 26.7 01/04/2021     01/04/2021    BUN 6 01/04/2021    CREATININE 0.86 01/04/2021    GLUCOSE 114 (H) 01/04/2021    CALCIUM 9.3 01/04/2021    ALKPHOS 92 01/04/2021    AST 18 01/04/2021    ALT 28 01/04/2021    BILITOT 0.3 01/04/2021    ALBUMIN 4.03 01/04/2021    PROTEINTOT 7.0 01/04/2021    PHOS 4.1 08/30/2020       No results found for: LDH, URICACID     Imaging Results (Last 24 Hours)     ** No results found for the last 24 hours. **           ASSESSMENT/PLAN:    Patient Update Assessment (new medications, allergies, medical history): Assessed    Medication(s): Temodar 150 mg/m2 (320 mg) days 1-5 of 28 day cycle    Currently Taking Medication(s): Will start on 1/11/21    Effectiveness of Medication: Effective    Experiencing Side Effects: Patient tolerated Temodar well with XRT    Prior Authorization Status: Was obtained with insurance    Financial Assistance Status: None needed at this time    Any Issues Identified: None identified    Appropriate to Process Prescription(s): Yes, after MD consult, chart review, and patient discussion, it is appropriate to process at this time. For the 320 mg dose, patient will take 1 capsule of each 140 mg and 180 mg daily on days 1-5. Patient was also prescribed Zofran/Compazine for nausea and is still on Bactrim for PCP prophylaxis.    Counseling Offered: Patient previously counseled upon beginning of treatment. Patient did see APRN on 1/4/21 and he was agreeable to Temodar monotherapy. He will begin on 1/11/21. Medication picked up from pharmacy on 1/4/21.     Next Specialty Pharmacy Visit: In ~ 4 weeks

## 2021-01-06 ENCOUNTER — OFFICE VISIT (OUTPATIENT)
Dept: RADIATION ONCOLOGY | Facility: HOSPITAL | Age: 56
End: 2021-01-06

## 2021-01-06 ENCOUNTER — HOSPITAL ENCOUNTER (OUTPATIENT)
Dept: RADIATION ONCOLOGY | Facility: HOSPITAL | Age: 56
Setting detail: RADIATION/ONCOLOGY SERIES
Discharge: HOME | End: 2021-01-06

## 2021-01-06 DIAGNOSIS — G93.89 BRAIN MASS: Primary | ICD-10-CM

## 2021-01-06 NOTE — PROGRESS NOTES
TELEMEDICINE FOLLOW-UP NOTE    PATIENT:                                                      Kennedy Reardon  MEDICAL RECORD #:                        [id number]  :                                                          1965  COMPLETION DATE:   2020  DIAGNOSIS:     Glioblastoma Multiforme      Time Devoted to Visit: 13 min including time to review his previous records, with 75% devoted to direct discussion.    Reason for Visit:  I personally contacted Kennedy Reardon today in an effort to screen for coronavirus symptoms and also to perform his scheduled follow-up visit remotely per patient preference/consent in light of the coronavirus pandemic.  With respect to his specific risks for coronavirus, his screen is as follows:    COVID-19 RISK SCREEN     1. Has the patient had close contact without PPE with a lab confirmed COVID-19 (+) person or a person under investigation (PUI) for COVID-19 infection?  -- No     2. Has the patient had respiratory symptoms, worsened/new cough and/or SOA, unexplained fever, or sudden loss of smell and/or taste in the past 7 days? --  No    3. Does the patient have baseline higher exposure risk such as working in healthcare field or currently residing in healthcare facility?  --  No     He also denied any new respiratory symptoms or fever within the past 14 days.    I counseled him regarding safe practices for minimizing risk for infection including hand-washing, self-isolation, limiting contacts, and we also discussed what to do should he develop symptoms including fever, chills, new cough, shortness of breath, or new body aches.        BRIEF HISTORY:  Kennedy Reardon is a 55 y.o. gentleman who recently presented with new onset seizures and was found to have a large cystic left temporal lobe mass.  He underwent surgical resection with pathology consistent with glioblastoma multiforme, IDH wild-type, tert mutant, 1P 19 Q non-co-del, and MGM T methylated.  He completed adjuvant and  concomitant Temodar chemoradiotherapy.  The left temporal lobe was treated to a dose of 60 Gy in 30 fractions.  He tolerated treatment well.  He has weaned off steroids at this point and continues on Keppra BID without seizures.  He feels fatigued but tries to walk outside when weather permits.  He reports infrequent headaches which are mild and self-resolving.  He has had improvement in photosensitivity and recently got transition lenses.  He denies diplopia, blurred vision, or other vision changes.  He notes good appetite and no recent nausea or vomiting.  He denies focal weakness or paresthesias and no new or progressive neurologic complaints.  Overall, he feels fairly well and has no specific concerns today.        MEDICATIONS: Medication reconciliation for the patient was reviewed and confirmed in the electronic medical record.    Review of Systems   Constitutional: Positive for fatigue.   HENT:          Treatment-related alopecia   Eyes: Positive for eye problems (photosensitivity - improved).   All other systems reviewed and are negative.      KPS 90%    Physical Exam  Pulmonary:      Respirations even, unlabored. No audible wheezing or cough.  Neurological:      A+Ox4, conversant, answers questions appropriately.  Psychiatric:     Judgement, affect, and decision-making WNL.    Limited physical exam as visit was conducted remotely via telephone in light of the COVID-19 pandemic.    The following portions of the patient's history were reviewed and updated as appropriate: allergies, current medications, past family history, past medical history, past social history, past surgical history and problem list.         Diagnoses and all orders for this visit:    1. 5 X 3.8cm L temporal glioma s/p crani and excision 8/28/2020 (Primary)         IMPRESSION:  Mr. Reardon is a 55 y.o. gentleman with a left temporal lobe GBM status post maximal resection.  He then received adjuvant and concurrent Temodar with radiation,  which he completed 1 month ago.  He tolerated treatment well and did not develop significant acute radiation-related toxicities.  From a symptomatic standpoint, he has had improvement in neurologic function.  He underwent repeat MRI brain 12/3/2020 which demonstrated improvement with less edema and decreased FLAIR/T1 with no evidence of new lesions.  He is scheduled to begin adjuvant Temodar chemotherapy alone next week with Dr. Casey.  He will undergo repeat MRI brain 2/4/2021 with neurosurgical follow-up.  We discussed follow-up intervals, surveillance imaging, and expectations for response to treatment.  He continues oncologic therapy and surveillance under the care of Dr. Casey locally in [place].  He continues neurosurgical care with Dr. Harrell.  Given his close follow-up with his other providers, we discussed that we are happy to remain available as needed.      RECOMMENDATIONS:      Left temporal lobe GBM IDH wild-type, tert mutant, 1P 19 Q9 co-del and MGM T methylated  -Status post maximal resection with Dr. Harrell on 8/29/2020  -Adjuvant concurrent Temodar with radiation, 60 Gy in 30 fractions to left temporal lobe  -Repeat MRI brain 12/3/2020 shows improvement with interval reduction in size of treated mass and surrounding edema  -Off of steroids at this time  -Neurologic status stable to improving  -Plan to proceed with adjuvant PO Temodar alone beginning 1/11/21 per Dr. Casey  -Serial MRI brain planned for 2/4/21    Seizures  -None since surgery, continues on Keppra BID     Hyperlipidemia  -Continue present regimen  -Per primary     Hypertension  -Continue present regimen  -Per primary     Obesity  -Complicates all care    Return if symptoms worsen or fail to improve, for Office Visit.    Venkatesh Abraham, APRN

## 2021-01-14 DIAGNOSIS — G93.89 BRAIN MASS: Primary | ICD-10-CM

## 2021-02-02 ENCOUNTER — SPECIALTY PHARMACY (OUTPATIENT)
Dept: PHARMACY | Facility: HOSPITAL | Age: 56
End: 2021-02-02

## 2021-02-02 DIAGNOSIS — G93.89 BRAIN MASS: ICD-10-CM

## 2021-02-03 ENCOUNTER — OFFICE VISIT (OUTPATIENT)
Dept: ONCOLOGY | Facility: CLINIC | Age: 56
End: 2021-02-03

## 2021-02-03 ENCOUNTER — LAB (OUTPATIENT)
Dept: ONCOLOGY | Facility: CLINIC | Age: 56
End: 2021-02-03

## 2021-02-03 VITALS
DIASTOLIC BLOOD PRESSURE: 81 MMHG | TEMPERATURE: 97.8 F | OXYGEN SATURATION: 96 % | BODY MASS INDEX: 33.75 KG/M2 | HEART RATE: 75 BPM | WEIGHT: 222 LBS | SYSTOLIC BLOOD PRESSURE: 129 MMHG | RESPIRATION RATE: 18 BRPM

## 2021-02-03 DIAGNOSIS — C71.9 GLIOMA (HCC): Primary | ICD-10-CM

## 2021-02-03 DIAGNOSIS — G93.89 BRAIN MASS: ICD-10-CM

## 2021-02-03 LAB
ALBUMIN SERPL-MCNC: 4.35 G/DL (ref 3.5–5.2)
ALBUMIN/GLOB SERPL: 1.5 G/DL
ALP SERPL-CCNC: 99 U/L (ref 39–117)
ALT SERPL W P-5'-P-CCNC: 34 U/L (ref 1–41)
ANION GAP SERPL CALCULATED.3IONS-SCNC: 7.7 MMOL/L (ref 5–15)
AST SERPL-CCNC: 22 U/L (ref 1–40)
BASOPHILS # BLD AUTO: 0.06 10*3/MM3 (ref 0–0.2)
BASOPHILS NFR BLD AUTO: 0.8 % (ref 0–1.5)
BILIRUB SERPL-MCNC: 0.4 MG/DL (ref 0–1.2)
BUN SERPL-MCNC: 5 MG/DL (ref 6–20)
BUN/CREAT SERPL: 5.4 (ref 7–25)
CALCIUM SPEC-SCNC: 9.3 MG/DL (ref 8.6–10.5)
CHLORIDE SERPL-SCNC: 101 MMOL/L (ref 98–107)
CO2 SERPL-SCNC: 27.3 MMOL/L (ref 22–29)
CREAT SERPL-MCNC: 0.92 MG/DL (ref 0.76–1.27)
DEPRECATED RDW RBC AUTO: 41 FL (ref 37–54)
EOSINOPHIL # BLD AUTO: 0.34 10*3/MM3 (ref 0–0.4)
EOSINOPHIL NFR BLD AUTO: 4.8 % (ref 0.3–6.2)
ERYTHROCYTE [DISTWIDTH] IN BLOOD BY AUTOMATED COUNT: 12.3 % (ref 12.3–15.4)
GFR SERPL CREATININE-BSD FRML MDRD: 86 ML/MIN/1.73
GLOBULIN UR ELPH-MCNC: 3 GM/DL
GLUCOSE SERPL-MCNC: 110 MG/DL (ref 65–99)
HCT VFR BLD AUTO: 45.3 % (ref 37.5–51)
HGB BLD-MCNC: 15.3 G/DL (ref 13–17.7)
IMM GRANULOCYTES # BLD AUTO: 0.02 10*3/MM3 (ref 0–0.05)
IMM GRANULOCYTES NFR BLD AUTO: 0.3 % (ref 0–0.5)
LYMPHOCYTES # BLD AUTO: 1.83 10*3/MM3 (ref 0.7–3.1)
LYMPHOCYTES NFR BLD AUTO: 25.9 % (ref 19.6–45.3)
MCH RBC QN AUTO: 30.8 PG (ref 26.6–33)
MCHC RBC AUTO-ENTMCNC: 33.8 G/DL (ref 31.5–35.7)
MCV RBC AUTO: 91.1 FL (ref 79–97)
MONOCYTES # BLD AUTO: 0.62 10*3/MM3 (ref 0.1–0.9)
MONOCYTES NFR BLD AUTO: 8.8 % (ref 5–12)
NEUTROPHILS NFR BLD AUTO: 4.19 10*3/MM3 (ref 1.7–7)
NEUTROPHILS NFR BLD AUTO: 59.4 % (ref 42.7–76)
NRBC BLD AUTO-RTO: 0 /100 WBC (ref 0–0.2)
PLATELET # BLD AUTO: 244 10*3/MM3 (ref 140–450)
PMV BLD AUTO: 9.6 FL (ref 6–12)
POTASSIUM SERPL-SCNC: 4.8 MMOL/L (ref 3.5–5.2)
PROT SERPL-MCNC: 7.3 G/DL (ref 6–8.5)
RBC # BLD AUTO: 4.97 10*6/MM3 (ref 4.14–5.8)
SODIUM SERPL-SCNC: 136 MMOL/L (ref 136–145)
WBC # BLD AUTO: 7.06 10*3/MM3 (ref 3.4–10.8)

## 2021-02-03 PROCEDURE — 80053 COMPREHEN METABOLIC PANEL: CPT | Performed by: INTERNAL MEDICINE

## 2021-02-03 PROCEDURE — 36415 COLL VENOUS BLD VENIPUNCTURE: CPT

## 2021-02-03 PROCEDURE — 99214 OFFICE O/P EST MOD 30 MIN: CPT | Performed by: INTERNAL MEDICINE

## 2021-02-03 PROCEDURE — 85025 COMPLETE CBC W/AUTO DIFF WBC: CPT | Performed by: INTERNAL MEDICINE

## 2021-02-03 RX ORDER — TEMOZOLOMIDE 180 MG/1
180 CAPSULE ORAL DAILY
Qty: 5 CAPSULE | Refills: 5 | Status: SHIPPED | OUTPATIENT
Start: 2021-02-03 | End: 2021-07-28 | Stop reason: SDUPTHER

## 2021-02-03 RX ORDER — TEMOZOLOMIDE 140 MG/1
140 CAPSULE ORAL DAILY
Qty: 5 CAPSULE | Refills: 5 | Status: SHIPPED | OUTPATIENT
Start: 2021-02-03 | End: 2021-07-28 | Stop reason: SDUPTHER

## 2021-02-03 NOTE — PROGRESS NOTES
Name:  Kennedy Reardon  :  1965  Date:  2/3/2021     REFERRING PHYSICIAN  Gilbert Harrell MD    PRIMARY CARE PHYSICIAN  Feliberto Moore MD    REASON FOR FOLLOWUP  1. Glioma (CMS/HCC)      CHIEF COMPLAINT  None.    Dear Dr. Harrell,    HISTORY OF PRESENT ILLNESS:   I saw Mr. Reardon in follow up today in our medical oncology clinic. As you are aware, he is a pleasant, 55 y.o., white male with a history of minimal medical problems who was in his usual state of health until late 2020 when he suffered a couple of seizures a few days apart. He presented to our ED following the second episode and was med-flighted to [facility] due to concerns that he was suffering a stroke. Ultimately, an MRI of the brain identified a cystic mass in the left temporoparietal region; and you took him to the OR on 2020. The final, surgical pathology was consistent with glioblastoma multiforme. He recovered uneventfully from his craniotomy, and he was subsequently referred to our clinic for assistance with his management closer to his home in [place], [place].     INTERIM HISTORY:  Mr. Reardon presents today for follow up again accompanied by his wife. He completed concomitant, daily PO Temodar and XRT on 2020 and tolerated this regimen well, with mild nausea as his only reported noticeable side effects. He was subsequently agreeable to transitioning to qmonthly, PO Temodar alone and began the first cycle on 2021 (taking the Temodar from -15). He reports tolerating this therapy very well also, with mild, but very tolerable, nausea for the first couple of days (he did not even have to take any antiemetics). He maintained a good appetite and continues to hydrate well. He denies any new or specific complaints today.    Past Medical History:   Diagnosis Date   • Brain cancer (CMS/HCC)    • History of radiation therapy 2020    left temporal lobe of brain   • Hyperlipidemia    • Hypertension     • Medical history reviewed with no changes    • Seizures (CMS/HCC)        Past Surgical History:   Procedure Laterality Date   • CRANIOTOMY FOR TUMOR Left 8/28/2020    Procedure: CRANIOTOMY FOR TUMOR LEFT;  Surgeon: Gilbert Harrell MD;  Location: [facility];  Service: Neurosurgery;  Laterality: Left;       Social History     Socioeconomic History   • Marital status:      Spouse name: Not on file   • Number of children: Not on file   • Years of education: Not on file   • Highest education level: Not on file   Tobacco Use   • Smoking status: Current Every Day Smoker     Packs/day: 0.50   • Smokeless tobacco: Never Used   Substance and Sexual Activity   • Alcohol use: No     Comment: few beers every now and then       Family History   Problem Relation Age of Onset   • Osteoarthritis Mother    • Rheum arthritis Mother    • Hypertension Mother    • Cancer Father    • Osteoporosis Sister    • Osteoarthritis Maternal Grandfather    • Rheum arthritis Maternal Grandfather    • Heart disease Paternal Grandmother    • Cancer Paternal Grandfather    • Heart disease Paternal Grandfather    • Diabetes Paternal Grandfather        No Known Allergies     .ECOG score: 0               Current Outpatient Medications   Medication Sig Dispense Refill   • aspirin 81 MG chewable tablet Chew 81 mg Daily.     • atenolol (TENORMIN) 50 MG tablet Take 50 mg by mouth Daily.     • cetirizine (zyrTEC) 10 MG tablet Take 10 mg by mouth Daily.     • docusate sodium 100 MG capsule Take 1capsule by mouth 2 (Two) Times a Day As Needed for Constipation. 30 each 2   • levETIRAcetam (KEPPRA) 500 MG tablet Take 1 tablet by mouth Every 12 (Twelve) Hours. 180 tablet 4   • losartan (COZAAR) 50 MG tablet Take 50 mg by mouth Daily.     • lovastatin (MEVACOR) 20 MG tablet Take 20 mg by mouth 2 (two) times a day.     • ondansetron (ZOFRAN) 4 MG tablet Take 1 tablet by mouth Every 8 (Eight) Hours As Needed for Nausea or Vomiting. 90 tablet 5   •  prochlorperazine (COMPAZINE) 10 MG tablet Take 1 tablet by mouth Every 8 (Eight) Hours As Needed for Nausea or Vomiting. 90 tablet 5   • sulfamethoxazole-trimethoprim (BACTRIM DS,SEPTRA DS) 800-160 MG per tablet Take 1 tablet by mouth 3 (Three) Times a Week. Take 1 tablet on Mondays, Wednesdays and Fridays. 12 tablet 7   • temozolomide (TEMODAR) 140 MG chemo capsule Take 1 capsule by mouth with 1 other temozolomide prescription for 320 mg total Daily for 5 days. Take 140 mg + 180 mg capsule on days 1-5 of each cycle. 5 capsule 5   • temozolomide (TEMODAR) 180 MG chemo capsule Take 1 capsule by mouth with 1 other temozolomide prescription for 320 mg total Daily for 5 days. Take 140 mg + 180 mg capsule on days 1-5 of each cycle. 5 capsule 5     No current facility-administered medications for this visit.      REVIEW OF SYSTEMS  CONSTITUTIONAL:  No fever, chills or night sweats.  EYES:  No blurry vision, diplopia or other vision changes. Improved photosensitivity, no longer wearing sunglasses indoors (as he was doing at the time of his initial consultation).  ENT:  No hearing loss, nosebleeds or sore throat.  CARDIOVASCULAR:  No palpitations, arrhythmia, syncopal episodes or edema.  PULMONARY:  No hemoptysis, wheezing, chronic cough or shortness of breath.  GASTROINTESTINAL:  No constipation or diarrhea. No abdominal pain. Mild, nausea for the first couple of days after starting qmonthly, PO Temodar, as per the HPI above.  GENITOURINARY:  No hematuria, kidney stones or frequent urination.  MUSCULOSKELETAL:  No joint or back pains.  INTEGUMENTARY: No rashes or pruritus.  ENDOCRINE:  No excessive thirst or hot flashes.  HEMATOLOGIC:  No history of free bleeding, spontaneous bleeding or clotting.  IMMUNOLOGIC:  No allergies or frequent infections.  NEUROLOGIC: As per the HPI above.  PSYCHIATRIC:  No anxiety or depression.    PHYSICAL EXAMINATION  /81   Pulse 75   Temp 97.8 °F (36.6 °C) (Temporal)   Resp 18   Wt  101 kg (222 lb)   SpO2 96%   BMI 33.75 kg/m²     Pain Score:  Pain Score    02/03/21 1035   PainSc: 0-No pain     PHQ-Score Total:  PHQ-9 Total Score:      GENERAL:  A well-developed, well-nourished, white male in no acute distress.  HEENT:  Pupils equally round reactive to light. Extraocular muscles intact. Improved/resolved alopecia.  CARDIOVASCULAR:  Regular rate and rhythm. No murmurs, gallops or rubs.  LUNGS:  Clear to auscultation bilaterally.  ABDOMEN:  Soft, nontender, nondistended with positive bowel sounds.  EXTREMITIES:  No clubbing, cyanosis or edema bilaterally.  SKIN:  No rashes or petechiae.  NEURO:  No focal deficits.  PSYCH:  Alert and oriented x3.    LABORATORY  Lab Results   Component Value Date    WBC 7.06 02/03/2021    HGB 15.3 02/03/2021    HCT 45.3 02/03/2021    MCV 91.1 02/03/2021     02/03/2021    NEUTROABS 4.19 02/03/2021       Lab Results   Component Value Date     02/03/2021    K 4.8 02/03/2021     02/03/2021    CO2 27.3 02/03/2021    BUN 5 (L) 02/03/2021    CREATININE 0.92 02/03/2021    GLUCOSE 110 (H) 02/03/2021    CALCIUM 9.3 02/03/2021    AST 22 02/03/2021    ALT 34 02/03/2021    ALKPHOS 99 02/03/2021    BILITOT 0.4 02/03/2021    PROTEINTOT 7.3 02/03/2021    ALBUMIN 4.35 02/03/2021     CBC (02/03/2021): WBCs: 7.0; HgB: 15.3; Hct: 45.3; platelets: 244  CBC (01/04/2021): WBCs: 7.4; HgB: 15.0; Hct: 45.5; platelets: 182  CBC (11/25/2020): WBCs: 7.8; HgB: 15.0; Hct: 45.0; platelets: 258    IMAGING  MRI brain with contrast (08/27/2020):  Impression: Large cystic mass identified in the left temporoparietal region with surgical markers in place for surgical planning. The largest axial dimension of the nodular component is 2.5 x 1.6 cm.    MRI brain with and without contrast (08/31/2020):  Impression: Postsurgical changes left temporal craniotomy and resection with expected early postoperative changes including dural enhancement mildly prominent along the left  inferolateral margin of the resection site with attention on followup otherwise, no residual soft tissue nodular enhancement or mass-occupying focus of enhancement. No midline shift or hydrocephalus.    MRI brain with and without contrast (10/01/2020):  Impression: T2 signal abnormality surrounding a left temporal mass measuring approximately 1.8 x 2.9 cm that shows predominantly peripheral enhancement. Some postsurgical changes noted overlying this region, but no prior examination is available for comparison.    MRI brain without contrast (12/03/2020):  Impression: The left temporal lobe lesion is slightly smaller now with several cystic areas in the lesion. There is less edema than on the previous exam in 10/2020.     PATHOLOGY  Temporal lobe mass (08/28/2020):  Glioblastoma multiforme. IDH1/IDH2 mutation not detected. 1p/19q co-deletion not detected. MGMT gene promotor methylation: Detected.    IMPRESSION AND PLAN  Mr. Reardon is a 55 y.o., white male with:  Glioblastoma multiforme: Initially diagnosed in late August 2020 following a couple of seizure events a few days apart and status post craniotomy with resection of a left, temporoparietal region, cystic mass on 08/27/2020. The final surgical pathology results are summarized above, and he recovered well from this procedure. I had a long discussion with the patient and his wife at the time of his initial consultation in our clinic (on 09/30/2020) regarding this diagnosis and its prognosis, reiterating much of what they were previously told by [facility] neurosurgery. They remain aware that his diagnosis is, unfortunately, ultimately very poor; however, this malignancy was, and remains, potentially treatable and sustained remissions are possible. Adjuvant, concomitant chemotherapy and radiation was recommended. He began a ~six-week course of this regimen (with temozolomide 75 mg/m2 daily with XRT; patient dose: 160 mg) by mid-October 2020 and completed it  by early December 2020, overall tolerating this regimen well, with some mild, manageable nausea as his only noticeable side effect. He had a repeat MRI with [facility] neurosurgery on 12/03/2020, following the conclusion of his chemo/XRT, which showed overall improvement (summarized above). Subsequently, he was felt to be a good candidate to transition to adjuvant, qmonthly PO Temodar alone (150 mg/m2 on days 1-5 of 28-day cycles; patient dose: 320 mg). He began the first cycle on 01/11/2021 (taking the Temodar from 01/11-15); and he reports today that he tolerated this treatment overall very well also (with some mild, manageable nausea for the first couple of days as his only noticeable side effect; he did not even have to take any antiemetics). He is scheduled to see neurosurgery again with another repeat MRI of the brain tomorrow (02/04/2021). Assuming he gets another good report, we will proceed with the current, adjuvant treatment plan (day #1 of cycle #2 scheduled for this coming Monday, 02/08/2021). We will see him back in our clinic in one month, just prior to the start of the potential third cycle of qmonthly, PO Temodar, with a CBC and CMP for another toxicity/symptom check. The patient and his wife were in agreement with these plans.    It is a pleasure to participate in Mr. Reardon's care. Please do not hesitate to call with any questions or concerns that you may have.    A total of 30 minutes were spent coordinating this patient’s care in clinic today; more than 50% of this time was face-to-face with the patient and his wife, reviewing his interim medical history, discussing the results of recent labwork and counseling on the current evaluation, treatment and followup plan. All questions were answered to their satisfaction.    FOLLOW UP  Proceed with adjuvant, qmonthly, PO Temodar alone, as planned (day #1 of cycle #2 scheduled for 02/08/2021). With neurosurgery with a repeat MRI of the brain  tomorrow (02/04/2021), as previously planned. Assuming he receives another good report (at tomorrow's appointment), return to our clinic in 1 month, just prior to the start of the potential 3rd cycle of qmonthly Temodar, with a CBC and CMP.            This document was electronically signed by KEON Casey MD February 3, 2021 11:01 EST      CC: MD Zena Talley MD James W. Killian, MD

## 2021-02-03 NOTE — PROGRESS NOTES
Specialty Pharmacy Note      Name:  Kennedy Reardon  :  1965  Date:  2/3/2021         Past Medical History:   Diagnosis Date   • Brain cancer (CMS/HCC)    • History of radiation therapy 2020    left temporal lobe of brain   • Hyperlipidemia    • Hypertension    • Medical history reviewed with no changes    • Seizures (CMS/HCC)        Past Surgical History:   Procedure Laterality Date   • CRANIOTOMY FOR TUMOR Left 2020    Procedure: CRANIOTOMY FOR TUMOR LEFT;  Surgeon: Gilbert Harrell MD;  Location: [facility];  Service: Neurosurgery;  Laterality: Left;       Social History     Socioeconomic History   • Marital status:      Spouse name: Not on file   • Number of children: Not on file   • Years of education: Not on file   • Highest education level: Not on file   Tobacco Use   • Smoking status: Current Every Day Smoker     Packs/day: 0.50   • Smokeless tobacco: Never Used   Substance and Sexual Activity   • Alcohol use: No     Comment: few beers every now and then       Family History   Problem Relation Age of Onset   • Osteoarthritis Mother    • Rheum arthritis Mother    • Hypertension Mother    • Cancer Father    • Osteoporosis Sister    • Osteoarthritis Maternal Grandfather    • Rheum arthritis Maternal Grandfather    • Heart disease Paternal Grandmother    • Cancer Paternal Grandfather    • Heart disease Paternal Grandfather    • Diabetes Paternal Grandfather        No Known Allergies    Current Outpatient Medications   Medication Sig Dispense Refill   • aspirin 81 MG chewable tablet Chew 81 mg Daily.     • atenolol (TENORMIN) 50 MG tablet Take 50 mg by mouth Daily.     • cetirizine (zyrTEC) 10 MG tablet Take 10 mg by mouth Daily.     • docusate sodium 100 MG capsule Take 1capsule by mouth 2 (Two) Times a Day As Needed for Constipation. 30 each 2   • levETIRAcetam (KEPPRA) 500 MG tablet Take 1 tablet by mouth Every 12 (Twelve) Hours. 180 tablet 4   • losartan (COZAAR) 50 MG tablet Take  50 mg by mouth Daily.     • lovastatin (MEVACOR) 20 MG tablet Take 20 mg by mouth 2 (two) times a day.     • ondansetron (ZOFRAN) 4 MG tablet Take 1 tablet by mouth Every 8 (Eight) Hours As Needed for Nausea or Vomiting. 90 tablet 5   • prochlorperazine (COMPAZINE) 10 MG tablet Take 1 tablet by mouth Every 8 (Eight) Hours As Needed for Nausea or Vomiting. 90 tablet 5   • sulfamethoxazole-trimethoprim (BACTRIM DS,SEPTRA DS) 800-160 MG per tablet Take 1 tablet by mouth 3 (Three) Times a Week. Take 1 tablet on Mondays, Wednesdays and Fridays. 12 tablet 7   • temozolomide (TEMODAR) 140 MG chemo capsule Take 1 capsule by mouth with 1 other temozolomide prescription for 320 mg total Daily for 5 days. Take 140 mg + 180 mg capsule on days 1-5 of each cycle. 5 capsule 5   • temozolomide (TEMODAR) 180 MG chemo capsule Take 1 capsule by mouth with 1 other temozolomide prescription for 320 mg total Daily for 5 days. Take 140 mg + 180 mg capsule on days 1-5 of each cycle. 5 capsule 5     No current facility-administered medications for this visit.          LABORATORY:    Lab Results   Component Value Date    WBC 7.44 01/04/2021    HGB 15.0 01/04/2021    HCT 45.5 01/04/2021    MCV 93.0 01/04/2021    RDW 12.2 (L) 01/04/2021     01/04/2021    NEUTRORELPCT 59.0 01/04/2021    LYMPHORELPCT 25.3 01/04/2021    MONORELPCT 9.3 01/04/2021    EOSRELPCT 5.6 01/04/2021    BASORELPCT 0.7 01/04/2021    NEUTROABS 4.39 01/04/2021    LYMPHSABS 1.88 01/04/2021       Lab Results   Component Value Date     (L) 01/04/2021    K 4.6 01/04/2021    CO2 26.7 01/04/2021     01/04/2021    BUN 6 01/04/2021    CREATININE 0.86 01/04/2021    GLUCOSE 114 (H) 01/04/2021    CALCIUM 9.3 01/04/2021    ALKPHOS 92 01/04/2021    AST 18 01/04/2021    ALT 28 01/04/2021    BILITOT 0.3 01/04/2021    ALBUMIN 4.03 01/04/2021    PROTEINTOT 7.0 01/04/2021    PHOS 4.1 08/30/2020       No results found for: LDH, URICACID     Imaging Results (Last 24 Hours)      ** No results found for the last 24 hours. **          ASSESSMENT/PLAN:     Patient Update Assessment (new medications, allergies, medical history): Assessed     Medication(s): Temodar 150 mg/m2 (320 mg) days 1-5 of 28 day cycle     Currently Taking Medication(s): Will start on 1/11/21     Effectiveness of Medication: Effective     Experiencing Side Effects: Patient tolerated Temodar well with XRT     Prior Authorization Status: Was obtained with insurance     Financial Assistance Status: None needed at this time     Any Issues Identified: None identified     Appropriate to Process Prescription(s): Yes, after MD consult, chart review, and patient discussion, it is appropriate to process at this time. For the 320 mg dose, patient will take 1 capsule of each 140 mg and 180 mg daily on days 1-5. Patient was also prescribed Zofran/Compazine for nausea and is still on Bactrim for PCP prophylaxis.     Counseling Offered: Patient previously counseled upon beginning of treatment. Patient did see APRN on 1/4/21 and he was agreeable to Temodar monotherapy. He will begin on 1/11/21. Medication picked up from pharmacy on 1/4/21.      Next Specialty Pharmacy Visit: 02/23/2021

## 2021-02-04 ENCOUNTER — HOSPITAL ENCOUNTER (OUTPATIENT)
Dept: MRI IMAGING | Facility: HOSPITAL | Age: 56
Discharge: HOME | End: 2021-02-04

## 2021-02-04 ENCOUNTER — OFFICE VISIT (OUTPATIENT)
Dept: NEUROSURGERY | Facility: CLINIC | Age: 56
End: 2021-02-04

## 2021-02-04 VITALS
TEMPERATURE: 97.5 F | SYSTOLIC BLOOD PRESSURE: 128 MMHG | HEIGHT: 68 IN | WEIGHT: 221 LBS | BODY MASS INDEX: 33.49 KG/M2 | DIASTOLIC BLOOD PRESSURE: 80 MMHG

## 2021-02-04 DIAGNOSIS — C71.9 GLIOMA (HCC): ICD-10-CM

## 2021-02-04 DIAGNOSIS — C71.9 GLIOMA (HCC): Primary | ICD-10-CM

## 2021-02-04 PROCEDURE — A9577 INJ MULTIHANCE: HCPCS

## 2021-02-04 PROCEDURE — 0 GADOBENATE DIMEGLUMINE 529 MG/ML SOLUTION

## 2021-02-04 PROCEDURE — 99212 OFFICE O/P EST SF 10 MIN: CPT | Performed by: NEUROLOGICAL SURGERY

## 2021-02-04 PROCEDURE — 0 GADOBENATE DIMEGLUMINE 529 MG/ML SOLUTION: Performed by: NEUROLOGICAL SURGERY

## 2021-02-04 PROCEDURE — 70553 MRI BRAIN STEM W/O & W/DYE: CPT | Performed by: RADIOLOGY

## 2021-02-04 PROCEDURE — A9577 INJ MULTIHANCE: HCPCS | Performed by: NEUROLOGICAL SURGERY

## 2021-02-04 PROCEDURE — 70553 MRI BRAIN STEM W/O & W/DYE: CPT

## 2021-02-04 RX ADMIN — GADOBENATE DIMEGLUMINE 20 ML: 529 INJECTION, SOLUTION INTRAVENOUS at 12:30

## 2021-02-04 NOTE — PROGRESS NOTES
Kennedy Reardon  1965  [id number]                        CHIEF COMPLAINT: Follow-up GBM         MEDICAL HISTORY SINCE LAST ENCOUNTER: This 55-year-old male is now 5 months subsequent resection of a left temple GBM. His speech is markedly improved. He is tolerating chemotherapy without issue. He reports today for repeat MRI for surveillance. He has no specific complaints           Past Medical History:   Diagnosis Date   • Brain cancer (CMS/HCC)    • History of radiation therapy 12/02/2020    left temporal lobe of brain   • Hyperlipidemia    • Hypertension    • Medical history reviewed with no changes    • Seizures (CMS/HCC)               Past Surgical History:   Procedure Laterality Date   • CRANIOTOMY FOR TUMOR Left 8/28/2020    Procedure: CRANIOTOMY FOR TUMOR LEFT;  Surgeon: Gilbert Harrell MD;  Location: [facility];  Service: Neurosurgery;  Laterality: Left;              Family History   Problem Relation Age of Onset   • Osteoarthritis Mother    • Rheum arthritis Mother    • Hypertension Mother    • Cancer Father    • Osteoporosis Sister    • Osteoarthritis Maternal Grandfather    • Rheum arthritis Maternal Grandfather    • Heart disease Paternal Grandmother    • Cancer Paternal Grandfather    • Heart disease Paternal Grandfather    • Diabetes Paternal Grandfather               Social History     Socioeconomic History   • Marital status:      Spouse name: Not on file   • Number of children: Not on file   • Years of education: Not on file   • Highest education level: Not on file   Tobacco Use   • Smoking status: Current Every Day Smoker     Packs/day: 0.50   • Smokeless tobacco: Never Used   Substance and Sexual Activity   • Alcohol use: No     Comment: few beers every now and then            No Known Allergies           Current Outpatient Medications:   •  aspirin 81 MG chewable tablet, Chew 81 mg Daily., Disp: , Rfl:   •  atenolol (TENORMIN) 50 MG tablet, Take 50 mg by mouth Daily., Disp: , Rfl:   •   cetirizine (zyrTEC) 10 MG tablet, Take 10 mg by mouth Daily., Disp: , Rfl:   •  docusate sodium 100 MG capsule, Take 1capsule by mouth 2 (Two) Times a Day As Needed for Constipation., Disp: 30 each, Rfl: 2  •  levETIRAcetam (KEPPRA) 500 MG tablet, Take 1 tablet by mouth Every 12 (Twelve) Hours., Disp: 180 tablet, Rfl: 4  •  losartan (COZAAR) 50 MG tablet, Take 50 mg by mouth Daily., Disp: , Rfl:   •  lovastatin (MEVACOR) 20 MG tablet, Take 20 mg by mouth 2 (two) times a day., Disp: , Rfl:   •  ondansetron (ZOFRAN) 4 MG tablet, Take 1 tablet by mouth Every 8 (Eight) Hours As Needed for Nausea or Vomiting., Disp: 90 tablet, Rfl: 5  •  prochlorperazine (COMPAZINE) 10 MG tablet, Take 1 tablet by mouth Every 8 (Eight) Hours As Needed for Nausea or Vomiting., Disp: 90 tablet, Rfl: 5  •  sulfamethoxazole-trimethoprim (BACTRIM DS,SEPTRA DS) 800-160 MG per tablet, Take 1 tablet by mouth 3 (Three) Times a Week. Take 1 tablet on Mondays, Wednesdays and Fridays., Disp: 12 tablet, Rfl: 7  •  temozolomide (TEMODAR) 140 MG chemo capsule, Take 1 capsule by mouth with 1 other temozolomide prescription for 320 mg total Daily for 5 days. Take 140 mg + 180 mg capsule on days 1-5 of each cycle., Disp: 5 capsule, Rfl: 5  •  temozolomide (TEMODAR) 180 MG chemo capsule, Take 1 capsule by mouth with 1 other temozolomide prescription for 320 mg total Daily for 5 days. Take 140 mg + 180 mg capsule on days 1-5 of each cycle., Disp: 5 capsule, Rfl: 5  No current facility-administered medications for this visit.     Facility-Administered Medications Ordered in Other Visits:   •  [COMPLETED] gadobenate dimeglumine (MULTIHANCE) injection 20 mL, 20 mL, Intravenous, Once in imaging, Gilbert Harrell MD, 20 mL at 02/04/21 1230         Review of Systems   Constitutional: Negative.  Negative for activity change, appetite change, chills, diaphoresis, fatigue, fever and unexpected weight change.   HENT: Negative.  Negative for congestion, dental  problem, drooling, ear discharge, ear pain, facial swelling, hearing loss, mouth sores, nosebleeds, postnasal drip, rhinorrhea, sinus pressure, sneezing, sore throat, tinnitus, trouble swallowing and voice change.    Eyes: Negative.  Negative for photophobia, pain, discharge, redness, itching and visual disturbance.   Respiratory: Negative.  Negative for apnea, cough, choking, chest tightness, shortness of breath, wheezing and stridor.    Cardiovascular: Negative.  Negative for chest pain, palpitations and leg swelling.   Gastrointestinal: Negative.  Negative for abdominal distention, abdominal pain, anal bleeding, blood in stool, constipation, diarrhea, nausea, rectal pain and vomiting.   Endocrine: Negative.  Negative for cold intolerance, heat intolerance, polydipsia, polyphagia and polyuria.   Genitourinary: Negative.  Negative for decreased urine volume, difficulty urinating, dysuria, enuresis, flank pain, frequency, genital sores, hematuria and urgency.   Musculoskeletal: Negative.  Negative for arthralgias, back pain, gait problem, joint swelling, myalgias, neck pain and neck stiffness.   Skin: Negative.  Negative for color change, pallor, rash and wound.   Allergic/Immunologic: Negative.  Negative for environmental allergies, food allergies and immunocompromised state.   Neurological: Negative.  Negative for dizziness, tremors, seizures, syncope, facial asymmetry, speech difficulty, weakness, light-headedness, numbness and headaches.   Hematological: Negative.  Negative for adenopathy. Does not bruise/bleed easily.   Psychiatric/Behavioral: Negative.  Negative for agitation, behavioral problems, confusion, decreased concentration, dysphoric mood, hallucinations, self-injury, sleep disturbance and suicidal ideas. The patient is not nervous/anxious and is not hyperactive.    All other systems reviewed and are negative.              Vitals:    02/04/21 1217   BP: 128/80   BP Location: Left arm   Patient  "Position: Sitting   Cuff Size: Adult   Temp: 97.5 °F (36.4 °C)   TempSrc: Infrared   Weight: 100 kg (221 lb)   Height: 172.7 cm (68\")               EXAMINATION: BMI 33.6, weight 221. Speech is significantly improved. There is no weakness sensory loss or reflex asymmetry            MEDICAL DECISION MAKING: MRI shows no evidence of progression. Remains quite stable           ASSESSMENT/DISPOSITION: He will return in 8 weeks for continued surveillance. As far there is no progression. We will continue to monitor this very carefully. Fortunately he is tolerating TMZ without problem. He has had no seizures.              I APPRECIATE THE OPPORTUNITY OF THIS REFERRAL. PLEASE CALL IF ANY       QUESTIONS [phone]    Scribed for Gilbert Harrell MD by Belkis Burrows CMA. 2/4/2021 12:23 EST      "

## 2021-02-23 ENCOUNTER — SPECIALTY PHARMACY (OUTPATIENT)
Dept: PHARMACY | Facility: HOSPITAL | Age: 56
End: 2021-02-23

## 2021-02-23 NOTE — PROGRESS NOTES
Specialty Pharmacy Note      Name:  Kennedy Reardon  :  1965  Date:  2021         Past Medical History:   Diagnosis Date   • Brain cancer (CMS/HCC)    • History of radiation therapy 2020    left temporal lobe of brain   • Hyperlipidemia    • Hypertension    • Medical history reviewed with no changes    • Seizures (CMS/HCC)        Past Surgical History:   Procedure Laterality Date   • CRANIOTOMY FOR TUMOR Left 2020    Procedure: CRANIOTOMY FOR TUMOR LEFT;  Surgeon: Gilbert Harrell MD;  [facility]  Service: Neurosurgery;  Laterality: Left;       Social History     Socioeconomic History   • Marital status:      Spouse name: Not on file   • Number of children: Not on file   • Years of education: Not on file   • Highest education level: Not on file   Tobacco Use   • Smoking status: Current Every Day Smoker     Packs/day: 0.50   • Smokeless tobacco: Never Used   Substance and Sexual Activity   • Alcohol use: No     Comment: few beers every now and then       Family History   Problem Relation Age of Onset   • Osteoarthritis Mother    • Rheum arthritis Mother    • Hypertension Mother    • Cancer Father    • Osteoporosis Sister    • Osteoarthritis Maternal Grandfather    • Rheum arthritis Maternal Grandfather    • Heart disease Paternal Grandmother    • Cancer Paternal Grandfather    • Heart disease Paternal Grandfather    • Diabetes Paternal Grandfather        No Known Allergies    Current Outpatient Medications   Medication Sig Dispense Refill   • aspirin 81 MG chewable tablet Chew 81 mg Daily.     • atenolol (TENORMIN) 50 MG tablet Take 50 mg by mouth Daily.     • cetirizine (zyrTEC) 10 MG tablet Take 10 mg by mouth Daily.     • docusate sodium 100 MG capsule Take 1capsule by mouth 2 (Two) Times a Day As Needed for Constipation. 30 each 2   • levETIRAcetam (KEPPRA) 500 MG tablet Take 1 tablet by mouth Every 12 (Twelve) Hours. 180 tablet 4   • losartan (COZAAR) 50 MG tablet  Take 50 mg by mouth Daily.     • lovastatin (MEVACOR) 20 MG tablet Take 20 mg by mouth 2 (two) times a day.     • ondansetron (ZOFRAN) 4 MG tablet Take 1 tablet by mouth Every 8 (Eight) Hours As Needed for Nausea or Vomiting. 90 tablet 5   • prochlorperazine (COMPAZINE) 10 MG tablet Take 1 tablet by mouth Every 8 (Eight) Hours As Needed for Nausea or Vomiting. 90 tablet 5   • sulfamethoxazole-trimethoprim (BACTRIM DS,SEPTRA DS) 800-160 MG per tablet Take 1 tablet by mouth 3 (Three) Times a Week. Take 1 tablet on Mondays, Wednesdays and Fridays. 12 tablet 7   • temozolomide (TEMODAR) 140 MG chemo capsule Take 1 capsule by mouth with 1 other temozolomide prescription for 320 mg total Daily for 5 days. Take 140 mg + 180 mg capsule on days 1-5 of each cycle. 5 capsule 5   • temozolomide (TEMODAR) 180 MG chemo capsule Take 1 capsule by mouth with 1 other temozolomide prescription for 320 mg total Daily for 5 days. Take 140 mg + 180 mg capsule on days 1-5 of each cycle. 5 capsule 5     No current facility-administered medications for this visit.          LABORATORY:    Lab Results   Component Value Date    WBC 7.06 02/03/2021    HGB 15.3 02/03/2021    HCT 45.3 02/03/2021    MCV 91.1 02/03/2021    RDW 12.3 02/03/2021     02/03/2021    NEUTRORELPCT 59.4 02/03/2021    LYMPHORELPCT 25.9 02/03/2021    MONORELPCT 8.8 02/03/2021    EOSRELPCT 4.8 02/03/2021    BASORELPCT 0.8 02/03/2021    NEUTROABS 4.19 02/03/2021    LYMPHSABS 1.83 02/03/2021       Lab Results   Component Value Date     02/03/2021    K 4.8 02/03/2021    CO2 27.3 02/03/2021     02/03/2021    BUN 5 (L) 02/03/2021    CREATININE 0.92 02/03/2021    GLUCOSE 110 (H) 02/03/2021    CALCIUM 9.3 02/03/2021    ALKPHOS 99 02/03/2021    AST 22 02/03/2021    ALT 34 02/03/2021    BILITOT 0.4 02/03/2021    ALBUMIN 4.35 02/03/2021    PROTEINTOT 7.3 02/03/2021    PHOS 4.1 08/30/2020       No results found for: LDH, URICACID     Imaging Results (Last 24 Hours)      ** No results found for the last 24 hours. **          ASSESSMENT/PLAN:    Patient Update Assessment (new medications, allergies, medical history): Assessed, no changes.     Medication(s):  Temozolomide 140 MG chemo capsule / Temozolomide 140 MG chemo capsules.     Currently Taking Medication(s): Patient reports taking medication as directed.     Effectiveness of Medication: To be assessed as clinically appropriate by oncology team.     Experiencing Side Effects:  No noticeable new or worsened side effects.     Prior Authorization Status: Approved.     Financial Assistance Status: Not needed at this time.      Appropriate to Process Prescription(s):  Yes.  Medication refills will be dispensed to patient from [facility].      Counseling Offered: Patient previously counseled upon initiation of therapy, aware to contact oncology pharmacist or oncology office staff with any new questions or concerns.      Next Specialty Pharmacy Visit:  Scheduled in four weeks.

## 2021-03-03 ENCOUNTER — LAB (OUTPATIENT)
Dept: ONCOLOGY | Facility: CLINIC | Age: 56
End: 2021-03-03

## 2021-03-03 ENCOUNTER — OFFICE VISIT (OUTPATIENT)
Dept: ONCOLOGY | Facility: CLINIC | Age: 56
End: 2021-03-03

## 2021-03-03 VITALS
TEMPERATURE: 98 F | HEART RATE: 76 BPM | OXYGEN SATURATION: 94 % | SYSTOLIC BLOOD PRESSURE: 135 MMHG | RESPIRATION RATE: 18 BRPM | BODY MASS INDEX: 34.21 KG/M2 | DIASTOLIC BLOOD PRESSURE: 89 MMHG | WEIGHT: 225 LBS

## 2021-03-03 DIAGNOSIS — C71.9 GLIOMA (HCC): Primary | ICD-10-CM

## 2021-03-03 DIAGNOSIS — C71.9 GLIOMA (HCC): ICD-10-CM

## 2021-03-03 LAB
ALBUMIN SERPL-MCNC: 4.4 G/DL (ref 3.5–5.2)
ALBUMIN/GLOB SERPL: 1.5 G/DL
ALP SERPL-CCNC: 99 U/L (ref 39–117)
ALT SERPL W P-5'-P-CCNC: 32 U/L (ref 1–41)
ANION GAP SERPL CALCULATED.3IONS-SCNC: 9.3 MMOL/L (ref 5–15)
AST SERPL-CCNC: 20 U/L (ref 1–40)
BASOPHILS # BLD AUTO: 0.05 10*3/MM3 (ref 0–0.2)
BASOPHILS NFR BLD AUTO: 0.7 % (ref 0–1.5)
BILIRUB SERPL-MCNC: 0.3 MG/DL (ref 0–1.2)
BUN SERPL-MCNC: 10 MG/DL (ref 6–20)
BUN/CREAT SERPL: 11.2 (ref 7–25)
CALCIUM SPEC-SCNC: 9.6 MG/DL (ref 8.6–10.5)
CHLORIDE SERPL-SCNC: 101 MMOL/L (ref 98–107)
CO2 SERPL-SCNC: 26.7 MMOL/L (ref 22–29)
CREAT SERPL-MCNC: 0.89 MG/DL (ref 0.76–1.27)
DEPRECATED RDW RBC AUTO: 42.8 FL (ref 37–54)
EOSINOPHIL # BLD AUTO: 0.31 10*3/MM3 (ref 0–0.4)
EOSINOPHIL NFR BLD AUTO: 4.1 % (ref 0.3–6.2)
ERYTHROCYTE [DISTWIDTH] IN BLOOD BY AUTOMATED COUNT: 12.4 % (ref 12.3–15.4)
GFR SERPL CREATININE-BSD FRML MDRD: 89 ML/MIN/1.73
GLOBULIN UR ELPH-MCNC: 3 GM/DL
GLUCOSE SERPL-MCNC: 115 MG/DL (ref 65–99)
HCT VFR BLD AUTO: 45.5 % (ref 37.5–51)
HGB BLD-MCNC: 15.1 G/DL (ref 13–17.7)
IMM GRANULOCYTES # BLD AUTO: 0.08 10*3/MM3 (ref 0–0.05)
IMM GRANULOCYTES NFR BLD AUTO: 1.1 % (ref 0–0.5)
LYMPHOCYTES # BLD AUTO: 1.83 10*3/MM3 (ref 0.7–3.1)
LYMPHOCYTES NFR BLD AUTO: 24.5 % (ref 19.6–45.3)
MCH RBC QN AUTO: 30.9 PG (ref 26.6–33)
MCHC RBC AUTO-ENTMCNC: 33.2 G/DL (ref 31.5–35.7)
MCV RBC AUTO: 93 FL (ref 79–97)
MONOCYTES # BLD AUTO: 0.61 10*3/MM3 (ref 0.1–0.9)
MONOCYTES NFR BLD AUTO: 8.2 % (ref 5–12)
NEUTROPHILS NFR BLD AUTO: 4.6 10*3/MM3 (ref 1.7–7)
NEUTROPHILS NFR BLD AUTO: 61.4 % (ref 42.7–76)
NRBC BLD AUTO-RTO: 0 /100 WBC (ref 0–0.2)
PLATELET # BLD AUTO: 244 10*3/MM3 (ref 140–450)
PMV BLD AUTO: 9.9 FL (ref 6–12)
POTASSIUM SERPL-SCNC: 4.6 MMOL/L (ref 3.5–5.2)
PROT SERPL-MCNC: 7.4 G/DL (ref 6–8.5)
RBC # BLD AUTO: 4.89 10*6/MM3 (ref 4.14–5.8)
SODIUM SERPL-SCNC: 137 MMOL/L (ref 136–145)
WBC # BLD AUTO: 7.48 10*3/MM3 (ref 3.4–10.8)

## 2021-03-03 PROCEDURE — 85025 COMPLETE CBC W/AUTO DIFF WBC: CPT | Performed by: INTERNAL MEDICINE

## 2021-03-03 PROCEDURE — 80053 COMPREHEN METABOLIC PANEL: CPT | Performed by: INTERNAL MEDICINE

## 2021-03-03 PROCEDURE — 99214 OFFICE O/P EST MOD 30 MIN: CPT | Performed by: INTERNAL MEDICINE

## 2021-03-03 NOTE — PROGRESS NOTES
Name:  Kennedy Reardon  :  1965  Date:  3/3/2021     REFERRING PHYSICIAN  Gilbert Harrell MD    PRIMARY CARE PHYSICIAN  Feliberto Moore MD    REASON FOR FOLLOWUP  1. Glioma (CMS/HCC)      CHIEF COMPLAINT  None.    Dear Dr. Harrell,    HISTORY OF PRESENT ILLNESS:   I saw Mr. Reardon in follow up today in our medical oncology clinic. As you are aware, he is a pleasant, 55 y.o., white male with a history of minimal medical problems who was in his usual state of health until late 2020 when he suffered a couple of seizures a few days apart. He presented to our ED following the second episode and was med-flighted to [facility] due to concerns that he was suffering a stroke. Ultimately, an MRI of the brain identified a cystic mass in the left temporoparietal region; and you took him to the OR on 2020. The final, surgical pathology was consistent with glioblastoma multiforme. He recovered uneventfully from his craniotomy, and he was subsequently referred to our clinic for assistance with his management closer to his home in [place], [place].     INTERIM HISTORY:  Mr. Reardon presents today for follow up again accompanied by his wife. He completed concomitant, daily PO Temodar and XRT on 2020 and tolerated this regimen well, with mild nausea as his only reported noticeable side effects. He was subsequently agreeable to transitioning to qmonthly, PO Temodar alone and began this regimen on 2021 (taking the Temodar from -15). He has now completed a total of two (2) cycles and continues to tolerate this treatment well, with mild, but very tolerable, nausea for the first couple of days of each cycle (he has still not even had to take any antiemetics). He is maintaining a good appetite and continues to hydrate well. He again denies any new or specific complaints.    Past Medical History:   Diagnosis Date   • Brain cancer (CMS/HCC)    • History of radiation therapy 2020     left temporal lobe of brain   • Hyperlipidemia    • Hypertension    • Medical history reviewed with no changes    • Seizures (CMS/HCC)        Past Surgical History:   Procedure Laterality Date   • CRANIOTOMY FOR TUMOR Left 8/28/2020    Procedure: CRANIOTOMY FOR TUMOR LEFT;  Surgeon: Gilbert Harrell MD;  Location: [facility];  Service: Neurosurgery;  Laterality: Left;       Social History     Socioeconomic History   • Marital status:      Spouse name: Not on file   • Number of children: Not on file   • Years of education: Not on file   • Highest education level: Not on file   Tobacco Use   • Smoking status: Current Every Day Smoker     Packs/day: 0.50   • Smokeless tobacco: Never Used   Substance and Sexual Activity   • Alcohol use: No     Comment: few beers every now and then       Family History   Problem Relation Age of Onset   • Osteoarthritis Mother    • Rheum arthritis Mother    • Hypertension Mother    • Cancer Father    • Osteoporosis Sister    • Osteoarthritis Maternal Grandfather    • Rheum arthritis Maternal Grandfather    • Heart disease Paternal Grandmother    • Cancer Paternal Grandfather    • Heart disease Paternal Grandfather    • Diabetes Paternal Grandfather        No Known Allergies     .ECOG score: 0               Current Outpatient Medications   Medication Sig Dispense Refill   • aspirin 81 MG chewable tablet Chew 81 mg Daily.     • atenolol (TENORMIN) 50 MG tablet Take 50 mg by mouth Daily.     • cetirizine (zyrTEC) 10 MG tablet Take 10 mg by mouth Daily.     • docusate sodium 100 MG capsule Take 1capsule by mouth 2 (Two) Times a Day As Needed for Constipation. 30 each 2   • levETIRAcetam (KEPPRA) 500 MG tablet Take 1 tablet by mouth Every 12 (Twelve) Hours. 180 tablet 4   • losartan (COZAAR) 50 MG tablet Take 50 mg by mouth Daily.     • lovastatin (MEVACOR) 20 MG tablet Take 20 mg by mouth 2 (two) times a day.     • ondansetron (ZOFRAN) 4 MG tablet Take 1 tablet by mouth Every 8  (Eight) Hours As Needed for Nausea or Vomiting. 90 tablet 5   • prochlorperazine (COMPAZINE) 10 MG tablet Take 1 tablet by mouth Every 8 (Eight) Hours As Needed for Nausea or Vomiting. 90 tablet 5   • sulfamethoxazole-trimethoprim (BACTRIM DS,SEPTRA DS) 800-160 MG per tablet Take 1 tablet by mouth 3 (Three) Times a Week. Take 1 tablet on Mondays, Wednesdays and Fridays. 12 tablet 7     No current facility-administered medications for this visit.      REVIEW OF SYSTEMS  CONSTITUTIONAL:  No fever, chills or night sweats.  EYES:  No blurry vision, diplopia or other vision changes. Improved photosensitivity, no longer wearing sunglasses indoors (as he was doing at the time of his initial consultation).  ENT:  No hearing loss, nosebleeds or sore throat.  CARDIOVASCULAR:  No palpitations, arrhythmia, syncopal episodes or edema.  PULMONARY:  No hemoptysis, wheezing, chronic cough or shortness of breath.  GASTROINTESTINAL:  No constipation or diarrhea. No abdominal pain. Mild, nausea for the first couple of days after starting qmonthly, PO Temodar, as per the HPI above.  GENITOURINARY:  No hematuria, kidney stones or frequent urination.  MUSCULOSKELETAL:  No joint or back pains.  INTEGUMENTARY: No rashes or pruritus.  ENDOCRINE:  No excessive thirst or hot flashes.  HEMATOLOGIC:  No history of free bleeding, spontaneous bleeding or clotting.  IMMUNOLOGIC:  No allergies or frequent infections.  NEUROLOGIC: As per the HPI above.  PSYCHIATRIC:  No anxiety or depression.    PHYSICAL EXAMINATION  /89   Pulse 76   Temp 98 °F (36.7 °C) (Temporal)   Resp 18   Wt 102 kg (225 lb)   SpO2 94%   BMI 34.21 kg/m²     Pain Score:  Pain Score    03/03/21 1058   PainSc: 0-No pain     PHQ-Score Total:  PHQ-9 Total Score:      GENERAL:  A well-developed, well-nourished, white male in no acute distress.  HEENT:  Pupils equally round reactive to light. Extraocular muscles intact. Improved/resolved alopecia.  CARDIOVASCULAR:  Regular  rate and rhythm. No murmurs, gallops or rubs.  LUNGS:  Clear to auscultation bilaterally.  ABDOMEN:  Soft, nontender, nondistended with positive bowel sounds.  EXTREMITIES:  No clubbing, cyanosis or edema bilaterally.  SKIN:  No rashes or petechiae.  NEURO:  No focal deficits.  PSYCH:  Alert and oriented x3.    The physical exam is unchanged from recent priors.    LABORATORY  Lab Results   Component Value Date    WBC 7.48 03/03/2021    HGB 15.1 03/03/2021    HCT 45.5 03/03/2021    MCV 93.0 03/03/2021     03/03/2021    NEUTROABS 4.60 03/03/2021       Lab Results   Component Value Date     03/03/2021    K 4.6 03/03/2021     03/03/2021    CO2 26.7 03/03/2021    BUN 10 03/03/2021    CREATININE 0.89 03/03/2021    GLUCOSE 115 (H) 03/03/2021    CALCIUM 9.6 03/03/2021    AST 20 03/03/2021    ALT 32 03/03/2021    ALKPHOS 99 03/03/2021    BILITOT 0.3 03/03/2021    PROTEINTOT 7.4 03/03/2021    ALBUMIN 4.40 03/03/2021     CBC (03/03/2021): WBCs: 7.4; HgB: 15.1; Hct: 45.5; platelets: 244  CBC (02/03/2021): WBCs: 7.0; HgB: 15.3; Hct: 45.3; platelets: 244  CBC (01/04/2021): WBCs: 7.4; HgB: 15.0; Hct: 45.5; platelets: 182  CBC (11/25/2020): WBCs: 7.8; HgB: 15.0; Hct: 45.0; platelets: 258    IMAGING  MRI brain with contrast (08/27/2020):  Impression: Large cystic mass identified in the left temporoparietal region with surgical markers in place for surgical planning. The largest axial dimension of the nodular component is 2.5 x 1.6 cm.    MRI brain with and without contrast (08/31/2020):  Impression: Postsurgical changes left temporal craniotomy and resection with expected early postoperative changes including dural enhancement mildly prominent along the left inferolateral margin of the resection site with attention on followup otherwise, no residual soft tissue nodular enhancement or mass-occupying focus of enhancement. No midline shift or hydrocephalus.    MRI brain with and without contrast  (10/01/2020):  Impression: T2 signal abnormality surrounding a left temporal mass measuring approximately 1.8 x 2.9 cm that shows predominantly peripheral enhancement. Some postsurgical changes noted overlying this region, but no prior examination is available for comparison.    MRI brain without contrast (12/03/2020):  Impression: The left temporal lobe lesion is slightly smaller now with several cystic areas in the lesion. There is less edema than on the previous exam in 10/2020.    MRI brain with and without contrast (02/04/2021):  Impression: [Remains stable per neurosurgery interpretation.]    PATHOLOGY  Temporal lobe mass (08/28/2020):  Glioblastoma multiforme. IDH1/IDH2 mutation not detected. 1p/19q co-deletion not detected. MGMT gene promotor methylation: Detected.    IMPRESSION AND PLAN  Mr. Reardon is a 55 y.o., white male with:  1. Glioblastoma multiforme: Initially diagnosed in late August 2020 following a couple of seizure events a few days apart and status post craniotomy with resection of a left, temporoparietal region, cystic mass on 08/27/2020. The final surgical pathology results are summarized above, and he recovered well from this procedure. I had a long discussion with the patient and his wife at the time of his initial consultation in our clinic (on 09/30/2020) regarding this diagnosis and its prognosis, reiterating much of what they were previously told by [facility] neurosurgery. They remain aware that his diagnosis is, unfortunately, ultimately very poor; however, this malignancy was, and remains, potentially treatable and sustained remissions are possible. Adjuvant, concomitant chemotherapy and radiation was recommended. He began a ~six-week course of this regimen (with temozolomide 75 mg/m2 daily with XRT; patient dose: 160 mg) by mid-October 2020 and completed it by early December 2020, overall tolerating this regimen well, with some mild, manageable nausea as his only noticeable  side effect. He had a repeat MRI with [facility] neurosurgery on 12/03/2020, following the conclusion of his chemo/XRT, which showed overall improvement (summarized above). Subsequently, he was felt to be a good candidate to transition to adjuvant, qmonthly PO Temodar alone (150 mg/m2 on days 1-5 of 28-day cycles; patient dose: 320 mg). He began this qmonthly therapy in mid-January 2021. He has now completed ~two (2) cycles (most recently taking the Temodar from 02/08-12); and he again reports today that he continues to tolerate this regimen overall well (with some mild, manageable nausea for the first couple of days as his only noticeable side effect; he has still not had to take any antiemetics). Meanwhile, the most recent repeat MRI of the brain (performed on 02/04/2021 and summarized above) remained stable. We will proceed with the current, adjuvant treatment plan (day #1 of cycle #3 scheduled for this coming Monday, 03/08/2021). We will see him back in our clinic in both one and two months, just prior to the start of the fourth and fifth cycles of qmonthly, PO Temodar, respectively, with CBCs and CMPs for toxicity/symptom check. He will follow up with neurosurgery with a repeat MRI of the brain again in early April.  2. Health maintenance: The patient (and his wife) are interested in receiving the COVID vaccine through our clinic once he is eligible (our pharmacy is currently providing shots to those > 60 years of age).  The patient and his wife were in agreement with these plans.    It is a pleasure to participate in Mr. Reardon's care. Please do not hesitate to call with any questions or concerns that you may have.    A total of 30 minutes were spent coordinating this patient’s care in clinic today; more than 50% of this time was face-to-face with the patient and his wife, reviewing his interim medical history, discussing the results of recent labwork and counseling on the current treatment and followup  plan. All questions were answered to their satisfaction.    FOLLOW UP  Proceed with adjuvant, qmonthly, PO Temodar alone, as planned (day #1 of cycle #3 scheduled for this coming Monday, 03/08/2021). With neurosurgery with a repeat MRI of the brain in early April 2021, as previously planned. Return to our clinic in both 1 and 2 months, just prior to the start of the 4th and 5th cycles of qmonthly Temodar, with a CBCs and CMPs.            This document was electronically signed by KEON Casey MD March 3, 2021 11:31 EST      CC: MD Zena Talley MD James W. Killian, MD

## 2021-03-23 ENCOUNTER — SPECIALTY PHARMACY (OUTPATIENT)
Dept: PHARMACY | Facility: HOSPITAL | Age: 56
End: 2021-03-23

## 2021-03-26 NOTE — PROGRESS NOTES
Specialty Pharmacy Note      Name:  Kennedy Reardon  :  1965  Date:  3/26/2021         Past Medical History:   Diagnosis Date   • Brain cancer (CMS/HCC)    • History of radiation therapy 2020    left temporal lobe of brain   • Hyperlipidemia    • Hypertension    • Medical history reviewed with no changes    • Seizures (CMS/HCC)        Past Surgical History:   Procedure Laterality Date   • CRANIOTOMY FOR TUMOR Left 2020    Procedure: CRANIOTOMY FOR TUMOR LEFT;  Surgeon: Gilbert Harrell MD;  Location: [facility];  Service: Neurosurgery;  Laterality: Left;       Social History     Socioeconomic History   • Marital status:      Spouse name: Not on file   • Number of children: Not on file   • Years of education: Not on file   • Highest education level: Not on file   Tobacco Use   • Smoking status: Current Every Day Smoker     Packs/day: 0.50   • Smokeless tobacco: Never Used   Vaping Use   • Vaping Use: Never used   Substance and Sexual Activity   • Alcohol use: No     Comment: few beers every now and then   • Drug use: Defer   • Sexual activity: Defer       Family History   Problem Relation Age of Onset   • Osteoarthritis Mother    • Rheum arthritis Mother    • Hypertension Mother    • Cancer Father    • Osteoporosis Sister    • Osteoarthritis Maternal Grandfather    • Rheum arthritis Maternal Grandfather    • Heart disease Paternal Grandmother    • Cancer Paternal Grandfather    • Heart disease Paternal Grandfather    • Diabetes Paternal Grandfather        No Known Allergies    Current Outpatient Medications   Medication Sig Dispense Refill   • aspirin 81 MG chewable tablet Chew 81 mg Daily.     • atenolol (TENORMIN) 50 MG tablet Take 50 mg by mouth Daily.     • cetirizine (zyrTEC) 10 MG tablet Take 10 mg by mouth Daily.     • docusate sodium 100 MG capsule Take 1capsule by mouth 2 (Two) Times a Day As Needed for Constipation. 30 each 2   • levETIRAcetam (KEPPRA) 500 MG tablet Take 1  tablet by mouth Every 12 (Twelve) Hours. 180 tablet 4   • losartan (COZAAR) 50 MG tablet Take 50 mg by mouth Daily.     • lovastatin (MEVACOR) 20 MG tablet Take 20 mg by mouth 2 (two) times a day.     • ondansetron (ZOFRAN) 4 MG tablet Take 1 tablet by mouth Every 8 (Eight) Hours As Needed for Nausea or Vomiting. 90 tablet 5   • prochlorperazine (COMPAZINE) 10 MG tablet Take 1 tablet by mouth Every 8 (Eight) Hours As Needed for Nausea or Vomiting. 90 tablet 5   • sulfamethoxazole-trimethoprim (BACTRIM DS,SEPTRA DS) 800-160 MG per tablet Take 1 tablet by mouth 3 (Three) Times a Week. Take 1 tablet on Mondays, Wednesdays and Fridays. 12 tablet 7   • temozolomide (TEMODAR) 140 MG chemo capsule Take 1 capsule by mouth with 1 other temozolomide prescription for 320 mg total Daily for 5 days. Take 140 mg + 180 mg capsule on days 1-5 of each cycle. 5 capsule 5   • temozolomide (TEMODAR) 180 MG chemo capsule Take 1 capsule by mouth with 1 other temozolomide prescription for 320 mg total Daily for 5 days. Take 140 mg + 180 mg capsule on days 1-5 of each cycle. 5 capsule 5     No current facility-administered medications for this visit.         LABORATORY:    Lab Results   Component Value Date    WBC 7.48 03/03/2021    HGB 15.1 03/03/2021    HCT 45.5 03/03/2021    MCV 93.0 03/03/2021    RDW 12.4 03/03/2021     03/03/2021    NEUTRORELPCT 61.4 03/03/2021    LYMPHORELPCT 24.5 03/03/2021    MONORELPCT 8.2 03/03/2021    EOSRELPCT 4.1 03/03/2021    BASORELPCT 0.7 03/03/2021    NEUTROABS 4.60 03/03/2021    LYMPHSABS 1.83 03/03/2021       Lab Results   Component Value Date     03/03/2021    K 4.6 03/03/2021    CO2 26.7 03/03/2021     03/03/2021    BUN 10 03/03/2021    CREATININE 0.89 03/03/2021    GLUCOSE 115 (H) 03/03/2021    CALCIUM 9.6 03/03/2021    ALKPHOS 99 03/03/2021    AST 20 03/03/2021    ALT 32 03/03/2021    BILITOT 0.3 03/03/2021    ALBUMIN 4.40 03/03/2021    PROTEINTOT 7.4 03/03/2021    PHOS 4.1  08/30/2020       No results found for: LDH, URICACID     Imaging Results (Last 24 Hours)     ** No results found for the last 24 hours. **          ASSESSMENT/PLAN:    Patient Update Assessment (new medications, allergies, medical history): Assessed, no changes.     Medication(s):  Temozolomide 140 MG chemo capsule / Temozolomide 140 MG chemo capsules.     Currently Taking Medication(s): Patient reports taking medication as directed.     Effectiveness of Medication: To be assessed as clinically appropriate by oncology team.     Experiencing Side Effects:  No noticeable new or worsened side effects.     Prior Authorization Status: Approved.     Financial Assistance Status: Not needed at this time.      Appropriate to Process Prescription(s):  Yes.  Medication refills will be dispensed to patient from [facility].      Counseling Offered: Patient previously counseled upon initiation of therapy, aware to contact oncology pharmacist or oncology office staff with any new questions or concerns.      Next Specialty Pharmacy Visit:  Scheduled in four weeks.

## 2021-03-30 ENCOUNTER — OFFICE VISIT (OUTPATIENT)
Dept: ONCOLOGY | Facility: CLINIC | Age: 56
End: 2021-03-30

## 2021-03-30 ENCOUNTER — LAB (OUTPATIENT)
Dept: ONCOLOGY | Facility: CLINIC | Age: 56
End: 2021-03-30

## 2021-03-30 VITALS
TEMPERATURE: 98.2 F | BODY MASS INDEX: 34.3 KG/M2 | HEART RATE: 73 BPM | WEIGHT: 225.6 LBS | SYSTOLIC BLOOD PRESSURE: 137 MMHG | OXYGEN SATURATION: 95 % | RESPIRATION RATE: 18 BRPM | DIASTOLIC BLOOD PRESSURE: 85 MMHG

## 2021-03-30 DIAGNOSIS — G93.89 BRAIN MASS: ICD-10-CM

## 2021-03-30 DIAGNOSIS — C71.9 GLIOMA (HCC): Primary | ICD-10-CM

## 2021-03-30 LAB
ALBUMIN SERPL-MCNC: 4.13 G/DL (ref 3.5–5.2)
ALBUMIN/GLOB SERPL: 1.4 G/DL
ALP SERPL-CCNC: 95 U/L (ref 39–117)
ALT SERPL W P-5'-P-CCNC: 30 U/L (ref 1–41)
ANION GAP SERPL CALCULATED.3IONS-SCNC: 11.7 MMOL/L (ref 5–15)
AST SERPL-CCNC: 23 U/L (ref 1–40)
BASOPHILS # BLD AUTO: 0.05 10*3/MM3 (ref 0–0.2)
BASOPHILS NFR BLD AUTO: 0.7 % (ref 0–1.5)
BILIRUB SERPL-MCNC: 0.4 MG/DL (ref 0–1.2)
BUN SERPL-MCNC: 7 MG/DL (ref 6–20)
BUN/CREAT SERPL: 8.8 (ref 7–25)
CALCIUM SPEC-SCNC: 9.3 MG/DL (ref 8.6–10.5)
CHLORIDE SERPL-SCNC: 101 MMOL/L (ref 98–107)
CO2 SERPL-SCNC: 25.3 MMOL/L (ref 22–29)
CREAT SERPL-MCNC: 0.8 MG/DL (ref 0.76–1.27)
DEPRECATED RDW RBC AUTO: 41.2 FL (ref 37–54)
EOSINOPHIL # BLD AUTO: 0.24 10*3/MM3 (ref 0–0.4)
EOSINOPHIL NFR BLD AUTO: 3.2 % (ref 0.3–6.2)
ERYTHROCYTE [DISTWIDTH] IN BLOOD BY AUTOMATED COUNT: 12.3 % (ref 12.3–15.4)
GFR SERPL CREATININE-BSD FRML MDRD: 101 ML/MIN/1.73
GLOBULIN UR ELPH-MCNC: 2.9 GM/DL
GLUCOSE SERPL-MCNC: 99 MG/DL (ref 65–99)
HCT VFR BLD AUTO: 44.1 % (ref 37.5–51)
HGB BLD-MCNC: 14.9 G/DL (ref 13–17.7)
IMM GRANULOCYTES # BLD AUTO: 0.02 10*3/MM3 (ref 0–0.05)
IMM GRANULOCYTES NFR BLD AUTO: 0.3 % (ref 0–0.5)
LYMPHOCYTES # BLD AUTO: 1.9 10*3/MM3 (ref 0.7–3.1)
LYMPHOCYTES NFR BLD AUTO: 25.5 % (ref 19.6–45.3)
MCH RBC QN AUTO: 30.7 PG (ref 26.6–33)
MCHC RBC AUTO-ENTMCNC: 33.8 G/DL (ref 31.5–35.7)
MCV RBC AUTO: 90.9 FL (ref 79–97)
MONOCYTES # BLD AUTO: 0.61 10*3/MM3 (ref 0.1–0.9)
MONOCYTES NFR BLD AUTO: 8.2 % (ref 5–12)
NEUTROPHILS NFR BLD AUTO: 4.63 10*3/MM3 (ref 1.7–7)
NEUTROPHILS NFR BLD AUTO: 62.1 % (ref 42.7–76)
NRBC BLD AUTO-RTO: 0 /100 WBC (ref 0–0.2)
PLATELET # BLD AUTO: 240 10*3/MM3 (ref 140–450)
PMV BLD AUTO: 9.4 FL (ref 6–12)
POTASSIUM SERPL-SCNC: 4.4 MMOL/L (ref 3.5–5.2)
PROT SERPL-MCNC: 7 G/DL (ref 6–8.5)
RBC # BLD AUTO: 4.85 10*6/MM3 (ref 4.14–5.8)
SODIUM SERPL-SCNC: 138 MMOL/L (ref 136–145)
WBC # BLD AUTO: 7.45 10*3/MM3 (ref 3.4–10.8)

## 2021-03-30 PROCEDURE — 99214 OFFICE O/P EST MOD 30 MIN: CPT | Performed by: INTERNAL MEDICINE

## 2021-03-30 PROCEDURE — 36415 COLL VENOUS BLD VENIPUNCTURE: CPT

## 2021-03-30 PROCEDURE — 80053 COMPREHEN METABOLIC PANEL: CPT | Performed by: INTERNAL MEDICINE

## 2021-03-30 PROCEDURE — 85025 COMPLETE CBC W/AUTO DIFF WBC: CPT | Performed by: INTERNAL MEDICINE

## 2021-03-30 NOTE — PROGRESS NOTES
Name:  Kennedy Reardon  :  1965  Date:  3/30/2021     REFERRING PHYSICIAN  Gilbert Harrell MD    PRIMARY CARE PHYSICIAN  Feliberto Moore MD    REASON FOR FOLLOWUP  1. Glioma (CMS/HCC)      CHIEF COMPLAINT  None.    Dear Dr. Harrell,    HISTORY OF PRESENT ILLNESS:   I saw Mr. Reardon in follow up today in our medical oncology clinic. As you are aware, he is a pleasant, 55 y.o., white male with a history of minimal medical problems who was in his usual state of health until late 2020 when he suffered a couple of seizures a few days apart. He presented to our ED following the second episode and was med-flighted to [facility] due to concerns that he was suffering a stroke. Ultimately, an MRI of the brain identified a cystic mass in the left temporoparietal region; and you took him to the OR on 2020. The final, surgical pathology was consistent with glioblastoma multiforme. He recovered uneventfully from his craniotomy, and he was subsequently referred to our clinic for assistance with his management closer to his home in [place], [place].     INTERIM HISTORY:  Mr. Reardon presents today for follow up again accompanied by his wife. He completed concomitant, daily PO Temodar and XRT on 2020 and tolerated this regimen well, with mild nausea as his only reported noticeable side effects. He was subsequently agreeable to transitioning to qmonthly, PO Temodar alone and began this regimen on 2021 (taking the Temodar from -15). He has now completed a total of three (3) cycles and continues to tolerate this treatment well, with mild, but very tolerable, nausea for the first couple of days of each cycle (he has still not even had to take any antiemetics). He is maintaining a good appetite and continues to hydrate well. He again denies any new or specific complaints.    Past Medical History:   Diagnosis Date   • Brain cancer (CMS/HCC)    • History of radiation therapy 2020     left temporal lobe of brain   • Hyperlipidemia    • Hypertension    • Medical history reviewed with no changes    • Seizures (CMS/HCC)        Past Surgical History:   Procedure Laterality Date   • CRANIOTOMY FOR TUMOR Left 8/28/2020    Procedure: CRANIOTOMY FOR TUMOR LEFT;  Surgeon: Gilbert Harrell MD;  Location: [facility];  Service: Neurosurgery;  Laterality: Left;       Social History     Socioeconomic History   • Marital status:      Spouse name: Not on file   • Number of children: Not on file   • Years of education: Not on file   • Highest education level: Not on file   Tobacco Use   • Smoking status: Current Every Day Smoker     Packs/day: 0.50   • Smokeless tobacco: Never Used   Vaping Use   • Vaping Use: Never used   Substance and Sexual Activity   • Alcohol use: No     Comment: few beers every now and then   • Drug use: Defer   • Sexual activity: Defer       Family History   Problem Relation Age of Onset   • Osteoarthritis Mother    • Rheum arthritis Mother    • Hypertension Mother    • Cancer Father    • Osteoporosis Sister    • Osteoarthritis Maternal Grandfather    • Rheum arthritis Maternal Grandfather    • Heart disease Paternal Grandmother    • Cancer Paternal Grandfather    • Heart disease Paternal Grandfather    • Diabetes Paternal Grandfather        No Known Allergies     .ECOG score: 0               Current Outpatient Medications   Medication Sig Dispense Refill   • aspirin 81 MG chewable tablet Chew 81 mg Daily.     • atenolol (TENORMIN) 50 MG tablet Take 50 mg by mouth Daily.     • cetirizine (zyrTEC) 10 MG tablet Take 10 mg by mouth Daily.     • docusate sodium 100 MG capsule Take 1capsule by mouth 2 (Two) Times a Day As Needed for Constipation. 30 each 2   • levETIRAcetam (KEPPRA) 500 MG tablet Take 1 tablet by mouth Every 12 (Twelve) Hours. 180 tablet 4   • losartan (COZAAR) 50 MG tablet Take 50 mg by mouth Daily.     • lovastatin (MEVACOR) 20 MG tablet Take 20 mg by mouth 2 (two)  times a day.     • ondansetron (ZOFRAN) 4 MG tablet Take 1 tablet by mouth Every 8 (Eight) Hours As Needed for Nausea or Vomiting. 90 tablet 5   • prochlorperazine (COMPAZINE) 10 MG tablet Take 1 tablet by mouth Every 8 (Eight) Hours As Needed for Nausea or Vomiting. 90 tablet 5   • sulfamethoxazole-trimethoprim (BACTRIM DS,SEPTRA DS) 800-160 MG per tablet Take 1 tablet by mouth 3 (Three) Times a Week. Take 1 tablet on ,  and . 12 tablet 7     No current facility-administered medications for this visit.     REVIEW OF SYSTEMS  CONSTITUTIONAL:  No fever, chills or night sweats.  EYES:  No blurry vision, diplopia or other vision changes. Improved photosensitivity, no longer wearing sunglasses indoors (as he was doing at the time of his initial consultation).  ENT:  No hearing loss, nosebleeds or sore throat.  CARDIOVASCULAR:  No palpitations, arrhythmia, syncopal episodes or edema.  PULMONARY:  No hemoptysis, wheezing, chronic cough or shortness of breath.  GASTROINTESTINAL:  No constipation or diarrhea. No abdominal pain. Mild, nausea for the first couple of days after starting qmonthly, PO Temodar, as per the HPI above.  GENITOURINARY:  No hematuria, kidney stones or frequent urination.  MUSCULOSKELETAL:  No joint or back pains.  INTEGUMENTARY: No rashes or pruritus.  ENDOCRINE:  No excessive thirst or hot flashes.  HEMATOLOGIC:  No history of free bleeding, spontaneous bleeding or clotting.  IMMUNOLOGIC:  No allergies or frequent infections.  NEUROLOGIC: As per the HPI above.  PSYCHIATRIC:  No anxiety or depression.    PHYSICAL EXAMINATION  /85   Pulse 73   Temp 98.2 °F (36.8 °C) (Temporal)   Resp 18   Wt 102 kg (225 lb 9.6 oz)   SpO2 95%   BMI 34.30 kg/m²     Pain Score:  Pain Score    21 1352   PainSc: 0-No pain     PHQ-Score Total:  PHQ-9 Total Score: 0    ECO  GENERAL:  A well-developed, well-nourished, white male in no acute distress.  HEENT:  Pupils equally round  reactive to light. Extraocular muscles intact. Resolved alopecia.  CARDIOVASCULAR:  Regular rate and rhythm. No murmurs, gallops or rubs.  LUNGS:  Clear to auscultation bilaterally.  ABDOMEN:  Soft, nontender, nondistended with positive bowel sounds.  EXTREMITIES:  No clubbing, cyanosis or edema bilaterally.  SKIN:  No rashes or petechiae.  NEURO:  No focal deficits.  PSYCH:  Alert and oriented x3.    LABORATORY  Lab Results   Component Value Date    WBC 7.45 03/30/2021    HGB 14.9 03/30/2021    HCT 44.1 03/30/2021    MCV 90.9 03/30/2021     03/30/2021    NEUTROABS 4.63 03/30/2021       Lab Results   Component Value Date     03/30/2021    K 4.4 03/30/2021     03/30/2021    CO2 25.3 03/30/2021    BUN 7 03/30/2021    CREATININE 0.80 03/30/2021    GLUCOSE 99 03/30/2021    CALCIUM 9.3 03/30/2021    AST 23 03/30/2021    ALT 30 03/30/2021    ALKPHOS 95 03/30/2021    BILITOT 0.4 03/30/2021    PROTEINTOT 7.0 03/30/2021    ALBUMIN 4.13 03/30/2021     CBC (03/30/2021): WBCs: 7.4; HgB: 14.9; Hct: 44.1; platelets: 240  CBC (03/03/2021): WBCs: 7.4; HgB: 15.1; Hct: 45.5; platelets: 244  CBC (02/03/2021): WBCs: 7.0; HgB: 15.3; Hct: 45.3; platelets: 244  CBC (01/04/2021): WBCs: 7.4; HgB: 15.0; Hct: 45.5; platelets: 182  CBC (11/25/2020): WBCs: 7.8; HgB: 15.0; Hct: 45.0; platelets: 258    IMAGING  MRI brain with contrast (08/27/2020):  Impression: Large cystic mass identified in the left temporoparietal region with surgical markers in place for surgical planning. The largest axial dimension of the nodular component is 2.5 x 1.6 cm.    MRI brain with and without contrast (08/31/2020):  Impression: Postsurgical changes left temporal craniotomy and resection with expected early postoperative changes including dural enhancement mildly prominent along the left inferolateral margin of the resection site with attention on followup otherwise, no residual soft tissue nodular enhancement or mass-occupying focus of  enhancement. No midline shift or hydrocephalus.    MRI brain with and without contrast (10/01/2020):  Impression: T2 signal abnormality surrounding a left temporal mass measuring approximately 1.8 x 2.9 cm that shows predominantly peripheral enhancement. Some postsurgical changes noted overlying this region, but no prior examination is available for comparison.    MRI brain without contrast (12/03/2020):  Impression: The left temporal lobe lesion is slightly smaller now with several cystic areas in the lesion. There is less edema than on the previous exam in 10/2020.    MRI brain with and without contrast (02/04/2021):  Impression: [Remains stable per neurosurgery interpretation.]    PATHOLOGY  Temporal lobe mass (08/28/2020):  Glioblastoma multiforme. IDH1/IDH2 mutation not detected. 1p/19q co-deletion not detected. MGMT gene promotor methylation: Detected.    IMPRESSION AND PLAN  Mr. Reardon is a 55 y.o., white male with:  1. Glioblastoma multiforme: Initially diagnosed in late August 2020 following a couple of seizure events a few days apart and status post craniotomy with resection of a left, temporoparietal region, cystic mass on 08/27/2020. The final surgical pathology results are summarized above, and he recovered well from this procedure. I had a long discussion with the patient and his wife at the time of his initial consultation in our clinic (on 09/30/2020) regarding this diagnosis and its prognosis, reiterating much of what they were previously told by [facility] neurosurgery. They remain aware that his diagnosis is, unfortunately, ultimately very poor; however, this malignancy was, and remains, potentially treatable and sustained remissions are possible. Adjuvant, concomitant chemotherapy and radiation was recommended. He began a ~six-week course of this regimen (with temozolomide 75 mg/m2 daily with XRT; patient dose: 160 mg) by mid-October 2020 and completed it by early December 2020, overall  tolerating this regimen well, with some mild, manageable nausea as his only noticeable side effect. He had a repeat MRI with [facility] neurosurgery on 12/03/2020, following the conclusion of his chemo/XRT, which showed overall improvement (summarized above). Subsequently, he was felt to be a good candidate to transition to adjuvant, qmonthly PO Temodar alone (150 mg/m2 on days 1-5 of 28-day cycles; patient dose: 320 mg). He began this qmonthly therapy in mid-January 2021. He has now completed ~three (3) cycles (most recently taking the Temodar from 03/08-12); and he again reports today that he continues to tolerate this regimen overall well (with some mild, manageable nausea for the first couple of days as his only noticeable side effect; he has still not had to take any antiemetics). Meanwhile, the most recent repeat MRI of the brain (performed on 02/04/2021 and summarized above) remained stable. Assuming that the next repeat MRI and NS appointments (scheduled for this Thursday, 4/1) go well, we will proceed with the current, adjuvant treatment plan (day #1 of cycle #4 scheduled for this coming Monday, 04/05/2021). We will see him back in our clinic in one month, just prior to the start of the potential fifth cycle of qmonthly, PO Temodar, with a repeat CBC and CMP for another toxicity/symptom check.  2. Health maintenance: The patient (and his wife) are still interested in receiving the COVID vaccine through our clinic and are now both eligible (our pharmacy is currently providing shots to those > 40 years of age). Both he and his wife were referred today.  The patient and his wife were in agreement with these plans.    It is a pleasure to participate in Mr. Reardon's care. Please do not hesitate to call with any questions or concerns that you may have.    A total of 30 minutes were spent coordinating this patient’s care in clinic today; more than 50% of this time was face-to-face with the patient and his  wife, reviewing his interim medical history, discussing the results of recent labwork and counseling on the current treatment and followup plan. All questions were answered to their satisfaction.    FOLLOW UP  Both the patient (and his wife) were referred for COVID vaccinations. Proceed with adjuvant, qmonthly, PO Temodar alone, as planned (day #1 of cycle #4 scheduled for this coming Monday, 04/05/2021). With neurosurgery with a repeat MRI of the brain this Thursday, 4/1, as previously planned. Return to our clinic in 1 month, just prior to the start of the 5th cycle of qmonthly Temodar, with a CBC and CMP.            This document was electronically signed by KEON Casey MD March 30, 2021 14:19 EDT      CC: MD Zena Talley MD James W. Killian, MD

## 2021-04-01 ENCOUNTER — OFFICE VISIT (OUTPATIENT)
Dept: NEUROSURGERY | Facility: CLINIC | Age: 56
End: 2021-04-01

## 2021-04-01 ENCOUNTER — APPOINTMENT (OUTPATIENT)
Dept: MRI IMAGING | Facility: HOSPITAL | Age: 56
End: 2021-04-01

## 2021-04-01 ENCOUNTER — HOSPITAL ENCOUNTER (OUTPATIENT)
Dept: MRI IMAGING | Facility: HOSPITAL | Age: 56
Discharge: HOME | End: 2021-04-01

## 2021-04-01 ENCOUNTER — IMMUNIZATION (OUTPATIENT)
Dept: VACCINE CLINIC | Facility: HOSPITAL | Age: 56
End: 2021-04-01

## 2021-04-01 VITALS
SYSTOLIC BLOOD PRESSURE: 134 MMHG | WEIGHT: 226.8 LBS | HEIGHT: 68 IN | BODY MASS INDEX: 34.37 KG/M2 | DIASTOLIC BLOOD PRESSURE: 70 MMHG | TEMPERATURE: 96.8 F

## 2021-04-01 DIAGNOSIS — C71.9 GLIOMA (HCC): Primary | ICD-10-CM

## 2021-04-01 PROCEDURE — A9577 INJ MULTIHANCE: HCPCS | Performed by: NEUROLOGICAL SURGERY

## 2021-04-01 PROCEDURE — 0001A: CPT | Performed by: INTERNAL MEDICINE

## 2021-04-01 PROCEDURE — 0 GADOBENATE DIMEGLUMINE 529 MG/ML SOLUTION: Performed by: NEUROLOGICAL SURGERY

## 2021-04-01 PROCEDURE — 70553 MRI BRAIN STEM W/O & W/DYE: CPT

## 2021-04-01 PROCEDURE — 99213 OFFICE O/P EST LOW 20 MIN: CPT | Performed by: NEUROLOGICAL SURGERY

## 2021-04-01 PROCEDURE — A9577 INJ MULTIHANCE: HCPCS

## 2021-04-01 PROCEDURE — 0 GADOBENATE DIMEGLUMINE 529 MG/ML SOLUTION

## 2021-04-01 PROCEDURE — 91300 HC SARSCOV02 VAC 30MCG/0.3ML IM: CPT | Performed by: INTERNAL MEDICINE

## 2021-04-01 PROCEDURE — 70553 MRI BRAIN STEM W/O & W/DYE: CPT | Performed by: RADIOLOGY

## 2021-04-01 RX ADMIN — GADOBENATE DIMEGLUMINE 20 ML: 529 INJECTION, SOLUTION INTRAVENOUS at 11:16

## 2021-04-01 NOTE — PROGRESS NOTES
Kennedy Reardon  1965  [id number]                        CHIEF COMPLAINT:  [Follow-up glioma]         MEDICAL HISTORY SINCE LAST ENCOUNTER:  [This is a 55-year-old male who is now approximately 7 months subsequent resection of the left temporal GBM. His speech is markedly better and he is stable. Is tolerating chemotherapy without a problem. He reports for 8-week follow-up and sequential MRI. He has no specific symptoms]           Past Medical History:   Diagnosis Date   • Brain cancer (CMS/HCC)    • History of radiation therapy 12/02/2020    left temporal lobe of brain   • Hyperlipidemia    • Hypertension    • Medical history reviewed with no changes    • Seizures (CMS/HCC)               Past Surgical History:   Procedure Laterality Date   • CRANIOTOMY FOR TUMOR Left 8/28/2020    Procedure: CRANIOTOMY FOR TUMOR LEFT;  Surgeon: Gilbert Harrell MD;  Location: [facility];  Service: Neurosurgery;  Laterality: Left;              Family History   Problem Relation Age of Onset   • Osteoarthritis Mother    • Rheum arthritis Mother    • Hypertension Mother    • Cancer Father    • Osteoporosis Sister    • Osteoarthritis Maternal Grandfather    • Rheum arthritis Maternal Grandfather    • Heart disease Paternal Grandmother    • Cancer Paternal Grandfather    • Heart disease Paternal Grandfather    • Diabetes Paternal Grandfather               Social History     Socioeconomic History   • Marital status:      Spouse name: Not on file   • Number of children: Not on file   • Years of education: Not on file   • Highest education level: Not on file   Tobacco Use   • Smoking status: Current Every Day Smoker     Packs/day: 0.50   • Smokeless tobacco: Never Used   Vaping Use   • Vaping Use: Never used   Substance and Sexual Activity   • Alcohol use: No     Comment: few beers every now and then   • Drug use: Defer   • Sexual activity: Defer            No Known Allergies           Current Outpatient Medications:   •  aspirin 81  MG chewable tablet, Chew 81 mg Daily., Disp: , Rfl:   •  atenolol (TENORMIN) 50 MG tablet, Take 50 mg by mouth Daily., Disp: , Rfl:   •  cetirizine (zyrTEC) 10 MG tablet, Take 10 mg by mouth Daily., Disp: , Rfl:   •  docusate sodium 100 MG capsule, Take 1capsule by mouth 2 (Two) Times a Day As Needed for Constipation., Disp: 30 each, Rfl: 2  •  levETIRAcetam (KEPPRA) 500 MG tablet, Take 1 tablet by mouth Every 12 (Twelve) Hours., Disp: 180 tablet, Rfl: 4  •  losartan (COZAAR) 50 MG tablet, Take 50 mg by mouth Daily., Disp: , Rfl:   •  lovastatin (MEVACOR) 20 MG tablet, Take 20 mg by mouth 2 (two) times a day., Disp: , Rfl:   •  ondansetron (ZOFRAN) 4 MG tablet, Take 1 tablet by mouth Every 8 (Eight) Hours As Needed for Nausea or Vomiting., Disp: 90 tablet, Rfl: 5  •  prochlorperazine (COMPAZINE) 10 MG tablet, Take 1 tablet by mouth Every 8 (Eight) Hours As Needed for Nausea or Vomiting., Disp: 90 tablet, Rfl: 5  •  sulfamethoxazole-trimethoprim (BACTRIM DS,SEPTRA DS) 800-160 MG per tablet, Take 1 tablet by mouth 3 (Three) Times a Week. Take 1 tablet on Mondays, Wednesdays and Fridays., Disp: 12 tablet, Rfl: 7  •  temozolomide (TEMODAR) 140 MG chemo capsule, Take 1 capsule by mouth with 1 other temozolomide prescription for 320 mg total Daily for 5 days. Take 140 mg + 180 mg capsule on days 1-5 of each cycle., Disp: 5 capsule, Rfl: 5  •  temozolomide (TEMODAR) 180 MG chemo capsule, Take 1 capsule by mouth with 1 other temozolomide prescription for 320 mg total Daily for 5 days. Take 140 mg + 180 mg capsule on days 1-5 of each cycle., Disp: 5 capsule, Rfl: 5  No current facility-administered medications for this visit.         Review of Systems   Constitutional: Negative.  Negative for activity change, appetite change, chills, diaphoresis, fatigue, fever and unexpected weight change.   HENT: Negative.  Negative for congestion, dental problem, drooling, ear discharge, ear pain, facial swelling, hearing loss, mouth  sores, nosebleeds, postnasal drip, rhinorrhea, sinus pressure, sneezing, sore throat, tinnitus, trouble swallowing and voice change.    Eyes: Negative.  Negative for photophobia, pain, discharge, redness, itching and visual disturbance.   Respiratory: Negative.  Negative for apnea, cough, choking, chest tightness, shortness of breath, wheezing and stridor.    Cardiovascular: Negative.  Negative for chest pain, palpitations and leg swelling.   Gastrointestinal: Negative.  Negative for abdominal distention, abdominal pain, anal bleeding, blood in stool, constipation, diarrhea, nausea, rectal pain and vomiting.   Endocrine: Negative.  Negative for cold intolerance, heat intolerance, polydipsia, polyphagia and polyuria.   Genitourinary: Negative.  Negative for decreased urine volume, difficulty urinating, dysuria, enuresis, flank pain, frequency, genital sores, hematuria and urgency.   Musculoskeletal: Negative.  Negative for arthralgias, back pain, gait problem, joint swelling, myalgias, neck pain and neck stiffness.   Skin: Negative.  Negative for color change, pallor, rash and wound.   Allergic/Immunologic: Negative.  Negative for environmental allergies, food allergies and immunocompromised state.   Neurological: Negative.  Negative for dizziness, tremors, seizures, syncope, facial asymmetry, speech difficulty, weakness, light-headedness, numbness and headaches.   Hematological: Negative.  Negative for adenopathy. Does not bruise/bleed easily.   Psychiatric/Behavioral: Negative.  Negative for agitation, behavioral problems, confusion, decreased concentration, dysphoric mood, hallucinations, self-injury, sleep disturbance and suicidal ideas. The patient is not nervous/anxious and is not hyperactive.    All other systems reviewed and are negative.              Vitals:    04/01/21 1129   BP: 134/70   BP Location: Left arm   Patient Position: Sitting   Cuff Size: Adult   Temp: 96.8 °F (36 °C)   TempSrc: Infrared  "  Weight: 103 kg (226 lb 12.8 oz)   Height: 172.7 cm (68\")               EXAMINATION: BMI 34.4, weight 226. Slight dysphagia. No weakness, sensory loss or reflex asymmetry otherwise.            MEDICAL DECISION MAKING: I have reviewed the MRI and am concerned that the most recent MR shows very slight increase in size by 1-2 mm on certain views. Whether this is related to different views of the MRI and positioning or \"real\" is problematic.           ASSESSMENT/DISPOSITION: I am going to repeat his MRI in 1 month rather than 8 weeks. Should it look \"stable\" the increase is more apparent than real and Temodar should be continued. Alternatively if there is a change we may want to switch to a Avastin. I am not certain there is a change only concerned at this point.              I APPRECIATE THE OPPORTUNITY OF THIS REFERRAL. PLEASE CALL IF ANY       QUESTIONS [phone]          "

## 2021-04-22 ENCOUNTER — IMMUNIZATION (OUTPATIENT)
Dept: VACCINE CLINIC | Facility: HOSPITAL | Age: 56
End: 2021-04-22

## 2021-04-22 PROCEDURE — 91300 HC SARSCOV02 VAC 30MCG/0.3ML IM: CPT | Performed by: INTERNAL MEDICINE

## 2021-04-22 PROCEDURE — 0002A: CPT | Performed by: INTERNAL MEDICINE

## 2021-04-26 ENCOUNTER — SPECIALTY PHARMACY (OUTPATIENT)
Dept: PHARMACY | Facility: HOSPITAL | Age: 56
End: 2021-04-26

## 2021-04-29 ENCOUNTER — OFFICE VISIT (OUTPATIENT)
Dept: NEUROSURGERY | Facility: CLINIC | Age: 56
End: 2021-04-29

## 2021-04-29 ENCOUNTER — HOSPITAL ENCOUNTER (OUTPATIENT)
Dept: MRI IMAGING | Facility: HOSPITAL | Age: 56
Discharge: HOME | End: 2021-04-29
Admitting: NEUROLOGICAL SURGERY

## 2021-04-29 VITALS
SYSTOLIC BLOOD PRESSURE: 122 MMHG | HEIGHT: 68 IN | WEIGHT: 223 LBS | DIASTOLIC BLOOD PRESSURE: 72 MMHG | BODY MASS INDEX: 33.8 KG/M2 | TEMPERATURE: 98.4 F

## 2021-04-29 DIAGNOSIS — C71.9 GLIOMA (HCC): Primary | ICD-10-CM

## 2021-04-29 PROCEDURE — 70553 MRI BRAIN STEM W/O & W/DYE: CPT | Performed by: RADIOLOGY

## 2021-04-29 PROCEDURE — 0 GADOBENATE DIMEGLUMINE 529 MG/ML SOLUTION: Performed by: NEUROLOGICAL SURGERY

## 2021-04-29 PROCEDURE — 99213 OFFICE O/P EST LOW 20 MIN: CPT | Performed by: NEUROLOGICAL SURGERY

## 2021-04-29 PROCEDURE — 0 GADOBENATE DIMEGLUMINE 529 MG/ML SOLUTION

## 2021-04-29 PROCEDURE — A9577 INJ MULTIHANCE: HCPCS | Performed by: NEUROLOGICAL SURGERY

## 2021-04-29 PROCEDURE — 70553 MRI BRAIN STEM W/O & W/DYE: CPT

## 2021-04-29 PROCEDURE — A9577 INJ MULTIHANCE: HCPCS

## 2021-04-29 RX ADMIN — GADOBENATE DIMEGLUMINE 20 ML: 529 INJECTION, SOLUTION INTRAVENOUS at 10:53

## 2021-04-29 NOTE — PATIENT INSTRUCTIONS
Call Dr. Harrell on a Monday or Tuesday (ask for Leann or Mariella) and leave a message.     Dr. Harrell will call you back at the end of the day as soon as he can.     [phone] Leann    [phone] Mariella Michelle

## 2021-04-29 NOTE — PROGRESS NOTES
Kennedy Reardon  1965  [id number]                        CHIEF COMPLAINT: Left temporal glioma         MEDICAL HISTORY SINCE LAST ENCOUNTER: This is a 56-year-old male who is a proximally 10 months subsequent resection of a left temporal glioma followed by radiation and currently on TMZ.  Clinically he is doing well and quite stable.  MRI was repeated today and is here for continued surveillance.  No new complaints           Past Medical History:   Diagnosis Date   • Brain cancer (CMS/HCC)    • History of radiation therapy 12/02/2020    left temporal lobe of brain   • Hyperlipidemia    • Hypertension    • Medical history reviewed with no changes    • Seizures (CMS/HCC)               Past Surgical History:   Procedure Laterality Date   • CRANIOTOMY FOR TUMOR Left 8/28/2020    Procedure: CRANIOTOMY FOR TUMOR LEFT;  Surgeon: Gilbert Harrell MD;  Location: [facility];  Service: Neurosurgery;  Laterality: Left;              Family History   Problem Relation Age of Onset   • Osteoarthritis Mother    • Rheum arthritis Mother    • Hypertension Mother    • Cancer Father    • Osteoporosis Sister    • Osteoarthritis Maternal Grandfather    • Rheum arthritis Maternal Grandfather    • Heart disease Paternal Grandmother    • Cancer Paternal Grandfather    • Heart disease Paternal Grandfather    • Diabetes Paternal Grandfather               Social History     Socioeconomic History   • Marital status:      Spouse name: Not on file   • Number of children: Not on file   • Years of education: Not on file   • Highest education level: Not on file   Tobacco Use   • Smoking status: Current Every Day Smoker     Packs/day: 0.50   • Smokeless tobacco: Never Used   Vaping Use   • Vaping Use: Never used   Substance and Sexual Activity   • Alcohol use: No     Comment: few beers every now and then   • Drug use: Defer   • Sexual activity: Defer            No Known Allergies           Current Outpatient Medications:   •  aspirin 81 MG  chewable tablet, Chew 81 mg Daily., Disp: , Rfl:   •  atenolol (TENORMIN) 50 MG tablet, Take 50 mg by mouth Daily., Disp: , Rfl:   •  cetirizine (zyrTEC) 10 MG tablet, Take 10 mg by mouth Daily., Disp: , Rfl:   •  docusate sodium 100 MG capsule, Take 1capsule by mouth 2 (Two) Times a Day As Needed for Constipation., Disp: 30 each, Rfl: 2  •  levETIRAcetam (KEPPRA) 500 MG tablet, Take 1 tablet by mouth Every 12 (Twelve) Hours., Disp: 180 tablet, Rfl: 4  •  losartan (COZAAR) 50 MG tablet, Take 50 mg by mouth Daily., Disp: , Rfl:   •  lovastatin (MEVACOR) 20 MG tablet, Take 20 mg by mouth 2 (two) times a day., Disp: , Rfl:   •  ondansetron (ZOFRAN) 4 MG tablet, Take 1 tablet by mouth Every 8 (Eight) Hours As Needed for Nausea or Vomiting., Disp: 90 tablet, Rfl: 5  •  prochlorperazine (COMPAZINE) 10 MG tablet, Take 1 tablet by mouth Every 8 (Eight) Hours As Needed for Nausea or Vomiting., Disp: 90 tablet, Rfl: 5  •  sulfamethoxazole-trimethoprim (BACTRIM DS,SEPTRA DS) 800-160 MG per tablet, Take 1 tablet by mouth 3 (Three) Times a Week. Take 1 tablet on Mondays, Wednesdays and Fridays., Disp: 12 tablet, Rfl: 7  •  temozolomide (TEMODAR) 140 MG chemo capsule, Take 1 capsule by mouth with 1 other temozolomide prescription for 320 mg total Daily for 5 days. Take 140 mg + 180 mg capsule on days 1-5 of each cycle., Disp: 5 capsule, Rfl: 5  No current facility-administered medications for this visit.         Review of Systems   Constitutional: Negative for activity change, appetite change, chills, diaphoresis, fatigue, fever and unexpected weight change.   HENT: Negative for congestion, dental problem, drooling, ear discharge, ear pain, facial swelling, hearing loss, mouth sores, nosebleeds, postnasal drip, rhinorrhea, sinus pressure, sneezing, sore throat, tinnitus, trouble swallowing and voice change.    Eyes: Negative for photophobia, pain, discharge, redness, itching and visual disturbance.   Respiratory: Negative for  "apnea, cough, choking, chest tightness, shortness of breath, wheezing and stridor.    Cardiovascular: Negative for chest pain, palpitations and leg swelling.   Gastrointestinal: Negative for abdominal distention, abdominal pain, anal bleeding, blood in stool, constipation, diarrhea, nausea, rectal pain and vomiting.   Endocrine: Negative for cold intolerance, heat intolerance, polydipsia, polyphagia and polyuria.   Genitourinary: Negative for decreased urine volume, difficulty urinating, dysuria, enuresis, flank pain, frequency, genital sores, hematuria and urgency.   Musculoskeletal: Negative for arthralgias, back pain, gait problem, joint swelling, myalgias, neck pain and neck stiffness.   Skin: Negative for color change, pallor, rash and wound.   Allergic/Immunologic: Negative for environmental allergies, food allergies and immunocompromised state.   Neurological: Negative for dizziness, tremors, seizures, syncope, facial asymmetry, speech difficulty, weakness, light-headedness, numbness and headaches.   Hematological: Negative for adenopathy. Does not bruise/bleed easily.   Psychiatric/Behavioral: Negative for agitation, behavioral problems, confusion, decreased concentration, dysphoric mood, hallucinations, self-injury, sleep disturbance and suicidal ideas. The patient is not nervous/anxious and is not hyperactive.    All other systems reviewed and are negative.              Vitals:    04/29/21 1059   BP: 122/72   BP Location: Left arm   Patient Position: Sitting   Cuff Size: Adult   Temp: 98.4 °F (36.9 °C)   TempSrc: Infrared   Weight: 101 kg (223 lb)   Height: 172.7 cm (68\")               EXAMINATION: BMI 33.9, weight 223.  Has mild speech deficit but clearly much better than preoperative.  No hemiparesis.            MEDICAL DECISION MAKING: MRI is unchanged.  No evidence of progression at this time.           ASSESSMENT/DISPOSITION: There was a question of progression with last MRI however today's " measurements seem approximately 1-2 mm of smaller suggesting no significant progression.  We will continue with TMZ and see him in 8 weeks for continued surveillance.              I APPRECIATE THE OPPORTUNITY OF THIS REFERRAL. PLEASE CALL IF ANY       QUESTIONS [phone]

## 2021-05-05 ENCOUNTER — LAB (OUTPATIENT)
Dept: ONCOLOGY | Facility: CLINIC | Age: 56
End: 2021-05-05

## 2021-05-05 ENCOUNTER — OFFICE VISIT (OUTPATIENT)
Dept: ONCOLOGY | Facility: CLINIC | Age: 56
End: 2021-05-05

## 2021-05-05 VITALS
DIASTOLIC BLOOD PRESSURE: 83 MMHG | SYSTOLIC BLOOD PRESSURE: 138 MMHG | HEART RATE: 77 BPM | WEIGHT: 224.2 LBS | OXYGEN SATURATION: 96 % | TEMPERATURE: 98.7 F | RESPIRATION RATE: 18 BRPM | BODY MASS INDEX: 34.09 KG/M2

## 2021-05-05 DIAGNOSIS — C71.9 GLIOMA (HCC): Primary | ICD-10-CM

## 2021-05-05 DIAGNOSIS — C71.9 GLIOMA (HCC): ICD-10-CM

## 2021-05-05 LAB
ALBUMIN SERPL-MCNC: 4.26 G/DL (ref 3.5–5.2)
ALBUMIN/GLOB SERPL: 1.4 G/DL
ALP SERPL-CCNC: 89 U/L (ref 39–117)
ALT SERPL W P-5'-P-CCNC: 27 U/L (ref 1–41)
ANION GAP SERPL CALCULATED.3IONS-SCNC: 10.2 MMOL/L (ref 5–15)
AST SERPL-CCNC: 18 U/L (ref 1–40)
BASOPHILS # BLD AUTO: 0.04 10*3/MM3 (ref 0–0.2)
BASOPHILS NFR BLD AUTO: 0.6 % (ref 0–1.5)
BILIRUB SERPL-MCNC: 0.3 MG/DL (ref 0–1.2)
BUN SERPL-MCNC: 9 MG/DL (ref 6–20)
BUN/CREAT SERPL: 10 (ref 7–25)
CALCIUM SPEC-SCNC: 9.1 MG/DL (ref 8.6–10.5)
CHLORIDE SERPL-SCNC: 102 MMOL/L (ref 98–107)
CO2 SERPL-SCNC: 24.8 MMOL/L (ref 22–29)
CREAT SERPL-MCNC: 0.9 MG/DL (ref 0.76–1.27)
DEPRECATED RDW RBC AUTO: 42.5 FL (ref 37–54)
EOSINOPHIL # BLD AUTO: 0.19 10*3/MM3 (ref 0–0.4)
EOSINOPHIL NFR BLD AUTO: 2.6 % (ref 0.3–6.2)
ERYTHROCYTE [DISTWIDTH] IN BLOOD BY AUTOMATED COUNT: 12.3 % (ref 12.3–15.4)
GFR SERPL CREATININE-BSD FRML MDRD: 88 ML/MIN/1.73
GLOBULIN UR ELPH-MCNC: 3 GM/DL
GLUCOSE SERPL-MCNC: 127 MG/DL (ref 65–99)
HCT VFR BLD AUTO: 46.4 % (ref 37.5–51)
HGB BLD-MCNC: 15.5 G/DL (ref 13–17.7)
IMM GRANULOCYTES # BLD AUTO: 0.02 10*3/MM3 (ref 0–0.05)
IMM GRANULOCYTES NFR BLD AUTO: 0.3 % (ref 0–0.5)
LYMPHOCYTES # BLD AUTO: 1.48 10*3/MM3 (ref 0.7–3.1)
LYMPHOCYTES NFR BLD AUTO: 20.6 % (ref 19.6–45.3)
MCH RBC QN AUTO: 31.1 PG (ref 26.6–33)
MCHC RBC AUTO-ENTMCNC: 33.4 G/DL (ref 31.5–35.7)
MCV RBC AUTO: 93 FL (ref 79–97)
MONOCYTES # BLD AUTO: 0.54 10*3/MM3 (ref 0.1–0.9)
MONOCYTES NFR BLD AUTO: 7.5 % (ref 5–12)
NEUTROPHILS NFR BLD AUTO: 4.9 10*3/MM3 (ref 1.7–7)
NEUTROPHILS NFR BLD AUTO: 68.4 % (ref 42.7–76)
NRBC BLD AUTO-RTO: 0 /100 WBC (ref 0–0.2)
PLATELET # BLD AUTO: 208 10*3/MM3 (ref 140–450)
PMV BLD AUTO: 9.8 FL (ref 6–12)
POTASSIUM SERPL-SCNC: 4.5 MMOL/L (ref 3.5–5.2)
PROT SERPL-MCNC: 7.3 G/DL (ref 6–8.5)
RBC # BLD AUTO: 4.99 10*6/MM3 (ref 4.14–5.8)
SODIUM SERPL-SCNC: 137 MMOL/L (ref 136–145)
WBC # BLD AUTO: 7.17 10*3/MM3 (ref 3.4–10.8)

## 2021-05-05 PROCEDURE — 85025 COMPLETE CBC W/AUTO DIFF WBC: CPT | Performed by: INTERNAL MEDICINE

## 2021-05-05 PROCEDURE — 99214 OFFICE O/P EST MOD 30 MIN: CPT | Performed by: INTERNAL MEDICINE

## 2021-05-05 PROCEDURE — 80053 COMPREHEN METABOLIC PANEL: CPT | Performed by: INTERNAL MEDICINE

## 2021-05-05 NOTE — PROGRESS NOTES
Name:  Kennedy Reardon  :  1965  Date:  2021     REFERRING PHYSICIAN  Gilbert Harrell MD    PRIMARY CARE PHYSICIAN  Feliberto Moore MD    REASON FOR FOLLOWUP  1. Glioma (CMS/HCC)      CHIEF COMPLAINT  None.    Dear Dr. Harrell,    HISTORY OF PRESENT ILLNESS:   I saw Mr. Reardon in follow up today in our medical oncology clinic. As you are aware, he is a pleasant, 56 y.o., white male with a history of minimal medical problems who was in his usual state of health until late 2020 when he suffered a couple of seizures a few days apart. He presented to our ED following the second episode and was med-flighted to [facility] due to concerns that he was suffering a stroke. Ultimately, an MRI of the brain identified a cystic mass in the left temporoparietal region; and you took him to the OR on 2020. The final, surgical pathology was consistent with glioblastoma multiforme. He recovered uneventfully from his craniotomy, and he was subsequently referred to our clinic for assistance with his management closer to his home in [place], [place].     INTERIM HISTORY:  Mr. Reardon presents today for follow up. He completed concomitant, daily PO Temodar and XRT on 2020 and tolerated this regimen well, with mild nausea as his only reported noticeable side effects. He was subsequently agreeable to transitioning to qmonthly, PO Temodar alone and began this regimen on 2021 (taking the Temodar from -15). He has now completed a total of four (4) cycles and continues to tolerate this treatment well, with mild, but very tolerable, nausea for the first couple of days of each cycle (he has still not even had to take any antiemetics). He is maintaining a good appetite and continues to hydrate well. He again denies any new or specific complaints.    Past Medical History:   Diagnosis Date   • Brain cancer (CMS/HCC)    • History of radiation therapy 2020    left temporal lobe of brain   •  Hyperlipidemia    • Hypertension    • Medical history reviewed with no changes    • Seizures (CMS/HCC)        Past Surgical History:   Procedure Laterality Date   • CRANIOTOMY FOR TUMOR Left 8/28/2020    Procedure: CRANIOTOMY FOR TUMOR LEFT;  Surgeon: Gilbert Harrell MD;  Location: [facility];  Service: Neurosurgery;  Laterality: Left;       Social History     Socioeconomic History   • Marital status:      Spouse name: Not on file   • Number of children: Not on file   • Years of education: Not on file   • Highest education level: Not on file   Tobacco Use   • Smoking status: Current Every Day Smoker     Packs/day: 0.50   • Smokeless tobacco: Never Used   Vaping Use   • Vaping Use: Never used   Substance and Sexual Activity   • Alcohol use: No     Comment: few beers every now and then   • Drug use: Defer   • Sexual activity: Defer       Family History   Problem Relation Age of Onset   • Osteoarthritis Mother    • Rheum arthritis Mother    • Hypertension Mother    • Cancer Father    • Osteoporosis Sister    • Osteoarthritis Maternal Grandfather    • Rheum arthritis Maternal Grandfather    • Heart disease Paternal Grandmother    • Cancer Paternal Grandfather    • Heart disease Paternal Grandfather    • Diabetes Paternal Grandfather        No Known Allergies     .ECOG score: 0               Current Outpatient Medications   Medication Sig Dispense Refill   • aspirin 81 MG chewable tablet Chew 81 mg Daily.     • cetirizine (zyrTEC) 10 MG tablet Take 10 mg by mouth Daily.     • docusate sodium 100 MG capsule Take 1capsule by mouth 2 (Two) Times a Day As Needed for Constipation. 30 each 2   • levETIRAcetam (KEPPRA) 500 MG tablet Take 1 tablet by mouth Every 12 (Twelve) Hours. 180 tablet 4   • losartan (COZAAR) 50 MG tablet Take 50 mg by mouth Daily.     • lovastatin (MEVACOR) 20 MG tablet Take 20 mg by mouth 2 (two) times a day.     • ondansetron (ZOFRAN) 4 MG tablet Take 1 tablet by mouth Every 8 (Eight) Hours As  Needed for Nausea or Vomiting. 90 tablet 5   • prochlorperazine (COMPAZINE) 10 MG tablet Take 1 tablet by mouth Every 8 (Eight) Hours As Needed for Nausea or Vomiting. 90 tablet 5   • atenolol (TENORMIN) 50 MG tablet Take 50 mg by mouth Daily.     • sulfamethoxazole-trimethoprim (BACTRIM DS,SEPTRA DS) 800-160 MG per tablet Take 1 tablet by mouth 3 (Three) Times a Week. Take 1 tablet on Mondays, Wednesdays and Fridays. 12 tablet 7   • temozolomide (TEMODAR) 140 MG chemo capsule Take 1 capsule by mouth with 1 other temozolomide prescription for 320 mg total Daily for 5 days. Take 140 mg + 180 mg capsule on days 1-5 of each cycle. 5 capsule 5   • temozolomide (TEMODAR) 180 MG chemo capsule Take 1 capsule by mouth with 1 other temozolomide prescription for 320 mg total Daily for 5 days. Take 140 mg + 180 mg capsule on days 1-5 of each cycle. 5 capsule 5     No current facility-administered medications for this visit.     REVIEW OF SYSTEMS  CONSTITUTIONAL:  No fever, chills or night sweats.  EYES:  No blurry vision, diplopia or other vision changes. Improved photosensitivity, no longer wearing sunglasses indoors (as he was doing at the time of his initial consultation).  ENT:  No hearing loss, nosebleeds or sore throat.  CARDIOVASCULAR:  No palpitations, arrhythmia, syncopal episodes or edema.  PULMONARY:  No hemoptysis, wheezing, chronic cough or shortness of breath.  GASTROINTESTINAL:  No constipation or diarrhea. No abdominal pain. Mild, nausea for the first couple of days after starting qmonthly, PO Temodar, as per the HPI above.  GENITOURINARY:  No hematuria, kidney stones or frequent urination.  MUSCULOSKELETAL:  No joint or back pains.  INTEGUMENTARY: No rashes or pruritus.  ENDOCRINE:  No excessive thirst or hot flashes.  HEMATOLOGIC:  No history of free bleeding, spontaneous bleeding or clotting.  IMMUNOLOGIC:  No allergies or frequent infections.  NEUROLOGIC: As per the HPI above.  PSYCHIATRIC:  No anxiety or  depression.    PHYSICAL EXAMINATION  /83   Pulse 77   Temp 98.7 °F (37.1 °C) (Temporal)   Resp 18   Wt 102 kg (224 lb 3.2 oz)   SpO2 96%   BMI 34.09 kg/m²     Pain Score:  Pain Score    21 1031   PainSc: 0-No pain     PHQ-Score Total:  PHQ-9 Total Score:      ECO  GENERAL:  A well-developed, well-nourished, white male in no acute distress.  HEENT:  Pupils equally round reactive to light. Extraocular muscles intact. Resolved alopecia.  CARDIOVASCULAR:  Regular rate and rhythm. No murmurs, gallops or rubs.  LUNGS:  Clear to auscultation bilaterally.  ABDOMEN:  Soft, nontender, nondistended with positive bowel sounds.  EXTREMITIES:  No clubbing, cyanosis or edema bilaterally.  SKIN:  No rashes or petechiae.  NEURO:  No focal deficits.  PSYCH:  Alert and oriented x3.    LABORATORY  Lab Results   Component Value Date    WBC 7.17 2021    HGB 15.5 2021    HCT 46.4 2021    MCV 93.0 2021     2021    NEUTROABS 4.90 2021       Lab Results   Component Value Date     2021    K 4.5 2021     2021    CO2 24.8 2021    BUN 9 2021    CREATININE 0.90 2021    GLUCOSE 127 (H) 2021    CALCIUM 9.1 2021    AST 18 2021    ALT 27 2021    ALKPHOS 89 2021    BILITOT 0.3 2021    PROTEINTOT 7.3 2021    ALBUMIN 4.26 2021     CBC (2021): WBCs: 7.1; HgB: 15.5; Hct: 46.4; platelets: 208  CBC (2021): WBCs: 7.4; HgB: 14.9; Hct: 44.1; platelets: 240  CBC (2021): WBCs: 7.4; HgB: 15.1; Hct: 45.5; platelets: 244  CBC (2021): WBCs: 7.0; HgB: 15.3; Hct: 45.3; platelets: 244  CBC (2021): WBCs: 7.4; HgB: 15.0; Hct: 45.5; platelets: 182  CBC (2020): WBCs: 7.8; HgB: 15.0; Hct: 45.0; platelets: 258    IMAGING  MRI brain with contrast (2020):  Impression: Large cystic mass identified in the left temporoparietal region with surgical markers in place for surgical  planning. The largest axial dimension of the nodular component is 2.5 x 1.6 cm.    MRI brain with and without contrast (08/31/2020):  Impression: Postsurgical changes left temporal craniotomy and resection with expected early postoperative changes including dural enhancement mildly prominent along the left inferolateral margin of the resection site with attention on followup otherwise, no residual soft tissue nodular enhancement or mass-occupying focus of enhancement. No midline shift or hydrocephalus.    MRI brain with and without contrast (10/01/2020):  Impression: T2 signal abnormality surrounding a left temporal mass measuring approximately 1.8 x 2.9 cm that shows predominantly peripheral enhancement. Some postsurgical changes noted overlying this region, but no prior examination is available for comparison.    MRI brain without contrast (12/03/2020):  Impression: The left temporal lobe lesion is slightly smaller now with several cystic areas in the lesion. There is less edema than on the previous exam in 10/2020.    MRI brain with and without contrast (02/04/2021):  Impression: [Remains stable per neurosurgery interpretation.]    MRI brain with and without contrast (04/01/2021):  Impression:  1) Based on retrospective remeasurement, no interval change in size of enhancing mass left temporal lobe with cystic components and no change in the degree of peritumoral signal abnormality/vasogenic edema. Today's measurements are 3.0 x 2.4 cm.  2) Localized mass effect but no hydrocephalus or midline shift.  3) No new enhancing brain lesions identified.    MRI brain with and without contrast (04/29/2021):  Impression: Again there is a peripherally enhancing mass in the right temporal lobe again measuring about 2.1 x 2.6 cm. Again there is surrounding vasogenic edema.    PATHOLOGY  Temporal lobe mass (08/28/2020):  Glioblastoma multiforme. IDH1/IDH2 mutation not detected. 1p/19q co-deletion not detected. MGMT gene  promotor methylation: Detected.    IMPRESSION AND PLAN  Mr. Reardon is a 56 y.o., white male with:  1. Glioblastoma multiforme: Initially diagnosed in late August 2020 following a couple of seizure events a few days apart and status post craniotomy with resection of a left, temporoparietal region, cystic mass on 08/27/2020. The final surgical pathology results are summarized above, and he recovered well from this procedure. I have had multiple, long discussions with the patient (+/- his wife) since the time of his initial consultation in our clinic (on 09/30/2020) regarding this diagnosis and its prognosis, reiterating much of what they were previously told by [facility] neurosurgery. They remain aware that his diagnosis is, unfortunately, ultimately very poor; however, this malignancy was, and remains, potentially treatable and sustained remissions are possible. Adjuvant, concomitant chemotherapy and radiation was recommended. He began a ~six-week course of this regimen (with temozolomide 75 mg/m2 daily with XRT; patient dose: 160 mg) by mid-October 2020 and completed it by early December 2020, overall tolerating this regimen well, with some mild, manageable nausea as his only noticeable side effect. He had a repeat MRI with [facility] neurosurgery on 12/03/2020, following the conclusion of his chemo/XRT, which showed overall improvement (summarized above). Subsequently, he was felt to be a good candidate to transition to adjuvant, qmonthly PO Temodar alone (150 mg/m2 on days 1-5 of 28-day cycles; patient dose: 320 mg). He began this qmonthly therapy in mid-January 2021. He has now completed ~four (4) cycles (most recently taking the Temodar from 04/05-09); and he again reports today that he continues to tolerate this regimen overall well (with some mild, manageable nausea for the first couple of days as his only noticeable side effect; he has still not had to take any antiemetics). Meanwhile, the most  recent repeat MRI of the brain (performed on 04/29/2021 and summarized above) remained (per neurosurgery) overall stable. We will proceed with this current, adjuvant treatment plan (day #1 of cycle #5 scheduled for this coming Monday, 5/10). We will see him back in our clinic in both one and two months, just prior to the start of the potential sixth and seventh cycles, respectively, of qmonthly, PO Temodar, with repeat CBCs and CMPs for additional toxicity/symptom/lab checks.  2. Health maintenance: The patient (and his wife) were previously referred for COVID-19 vaccination.  The patient was in agreement with these plans.    It is a pleasure to participate in Mr. Reardon's care. Please do not hesitate to call with any questions or concerns that you may have.    A total of 30 minutes were spent coordinating this patient’s care in clinic today; more than 50% of this time was face-to-face with the patient and his wife, reviewing his interim medical history, discussing the results of recent labwork and counseling on the current treatment and followup plan. All questions were answered to their satisfaction.    FOLLOW UP  Proceed with adjuvant, qmonthly, PO Temodar alone, as planned (day #1 of cycle #5 scheduled for this coming Monday, 05/10/2021). With neurosurgery with a repeat MRI of the brain in late June, as previously planned. Return to our clinic in both 1 and 2 months, just prior to the start of the 6th and 7th cycles of qmonthly Temodar, respectively, with CBCs and CMPs.            This document was electronically signed by KEON Casey MD May 5, 2021 11:01 EDT      CC: MD Zena Talley MD James W. Killian, MD

## 2021-05-26 ENCOUNTER — SPECIALTY PHARMACY (OUTPATIENT)
Dept: PHARMACY | Facility: HOSPITAL | Age: 56
End: 2021-05-26

## 2021-05-28 NOTE — PROGRESS NOTES
Specialty Pharmacy Note      Name:  Kennedy Reardon  :  1965  Date:  2021         Past Medical History:   Diagnosis Date   • Brain cancer (CMS/HCC)    • History of radiation therapy 2020    left temporal lobe of brain   • Hyperlipidemia    • Hypertension    • Medical history reviewed with no changes    • Seizures (CMS/HCC)        Past Surgical History:   Procedure Laterality Date   • CRANIOTOMY FOR TUMOR Left 2020    Procedure: CRANIOTOMY FOR TUMOR LEFT;  Surgeon: Gilbert Harrell MD;  Location: [facility];  Service: Neurosurgery;  Laterality: Left;       Social History     Socioeconomic History   • Marital status:      Spouse name: Not on file   • Number of children: Not on file   • Years of education: Not on file   • Highest education level: Not on file   Tobacco Use   • Smoking status: Current Every Day Smoker     Packs/day: 0.50   • Smokeless tobacco: Never Used   Vaping Use   • Vaping Use: Never used   Substance and Sexual Activity   • Alcohol use: No     Comment: few beers every now and then   • Drug use: Defer   • Sexual activity: Defer       Family History   Problem Relation Age of Onset   • Osteoarthritis Mother    • Rheum arthritis Mother    • Hypertension Mother    • Cancer Father    • Osteoporosis Sister    • Osteoarthritis Maternal Grandfather    • Rheum arthritis Maternal Grandfather    • Heart disease Paternal Grandmother    • Cancer Paternal Grandfather    • Heart disease Paternal Grandfather    • Diabetes Paternal Grandfather        No Known Allergies    Current Outpatient Medications   Medication Sig Dispense Refill   • aspirin 81 MG chewable tablet Chew 81 mg Daily.     • atenolol (TENORMIN) 50 MG tablet Take 50 mg by mouth Daily.     • cetirizine (zyrTEC) 10 MG tablet Take 10 mg by mouth Daily.     • docusate sodium 100 MG capsule Take 1capsule by mouth 2 (Two) Times a Day As Needed for Constipation. 30 each 2   • levETIRAcetam (KEPPRA) 500 MG tablet Take 1  tablet by mouth Every 12 (Twelve) Hours. 180 tablet 4   • losartan (COZAAR) 50 MG tablet Take 50 mg by mouth Daily.     • lovastatin (MEVACOR) 20 MG tablet Take 20 mg by mouth 2 (two) times a day.     • ondansetron (ZOFRAN) 4 MG tablet Take 1 tablet by mouth Every 8 (Eight) Hours As Needed for Nausea or Vomiting. 90 tablet 5   • prochlorperazine (COMPAZINE) 10 MG tablet Take 1 tablet by mouth Every 8 (Eight) Hours As Needed for Nausea or Vomiting. 90 tablet 5   • sulfamethoxazole-trimethoprim (BACTRIM DS,SEPTRA DS) 800-160 MG per tablet Take 1 tablet by mouth 3 (Three) Times a Week. Take 1 tablet on Mondays, Wednesdays and Fridays. 12 tablet 7   • temozolomide (TEMODAR) 140 MG chemo capsule Take 1 capsule by mouth with 1 other temozolomide prescription for 320 mg total Daily for 5 days. Take 140 mg + 180 mg capsule on days 1-5 of each cycle. 5 capsule 5   • temozolomide (TEMODAR) 180 MG chemo capsule Take 1 capsule by mouth with 1 other temozolomide prescription for 320 mg total Daily for 5 days. Take 140 mg + 180 mg capsule on days 1-5 of each cycle. 5 capsule 5     No current facility-administered medications for this visit.         LABORATORY:    Lab Results   Component Value Date    WBC 7.17 05/05/2021    HGB 15.5 05/05/2021    HCT 46.4 05/05/2021    MCV 93.0 05/05/2021    RDW 12.3 05/05/2021     05/05/2021    NEUTRORELPCT 68.4 05/05/2021    LYMPHORELPCT 20.6 05/05/2021    MONORELPCT 7.5 05/05/2021    EOSRELPCT 2.6 05/05/2021    BASORELPCT 0.6 05/05/2021    NEUTROABS 4.90 05/05/2021    LYMPHSABS 1.48 05/05/2021       Lab Results   Component Value Date     05/05/2021    K 4.5 05/05/2021    CO2 24.8 05/05/2021     05/05/2021    BUN 9 05/05/2021    CREATININE 0.90 05/05/2021    GLUCOSE 127 (H) 05/05/2021    CALCIUM 9.1 05/05/2021    ALKPHOS 89 05/05/2021    AST 18 05/05/2021    ALT 27 05/05/2021    BILITOT 0.3 05/05/2021    ALBUMIN 4.26 05/05/2021    PROTEINTOT 7.3 05/05/2021    PHOS 4.1  08/30/2020       No results found for: LDH, URICACID     Imaging Results (Last 24 Hours)     ** No results found for the last 24 hours. **          ASSESSMENT/PLAN:    Patient Update Assessment (new medications, allergies, medical history): Assessed, no changes.     Medication(s):  Temozolomide 180 MG chemo capsule / Temozolomide 140 MG chemo capsules.     Currently Taking Medication(s): Patient reports taking medication as directed.     Effectiveness of Medication: To be assessed as clinically appropriate by oncology team.     Experiencing Side Effects:  No noticeable new or worsened side effects.     Prior Authorization Status: Approved.     Financial Assistance Status: Not needed at this time.      Appropriate to Process Prescription(s):  Yes.  Medication refills will be dispensed to patient from [facility]. Patient stated he would pick upon 6/2 for his upcoming cycle.     Counseling Offered: Patient previously counseled upon initiation of therapy, aware to contact oncology pharmacist or oncology office staff with any new questions or concerns.      Next Specialty Pharmacy Visit:  Scheduled in four weeks.

## 2021-06-02 ENCOUNTER — OFFICE VISIT (OUTPATIENT)
Dept: ONCOLOGY | Facility: CLINIC | Age: 56
End: 2021-06-02

## 2021-06-02 ENCOUNTER — LAB (OUTPATIENT)
Dept: ONCOLOGY | Facility: CLINIC | Age: 56
End: 2021-06-02

## 2021-06-02 VITALS
RESPIRATION RATE: 18 BRPM | BODY MASS INDEX: 34.24 KG/M2 | TEMPERATURE: 98.4 F | WEIGHT: 225.2 LBS | OXYGEN SATURATION: 97 % | SYSTOLIC BLOOD PRESSURE: 133 MMHG | HEART RATE: 80 BPM | DIASTOLIC BLOOD PRESSURE: 78 MMHG

## 2021-06-02 DIAGNOSIS — C71.9 GLIOMA (HCC): ICD-10-CM

## 2021-06-02 DIAGNOSIS — C71.9 GLIOMA (HCC): Primary | ICD-10-CM

## 2021-06-02 LAB
ALBUMIN SERPL-MCNC: 4.13 G/DL (ref 3.5–5.2)
ALBUMIN/GLOB SERPL: 1.4 G/DL
ALP SERPL-CCNC: 87 U/L (ref 39–117)
ALT SERPL W P-5'-P-CCNC: 19 U/L (ref 1–41)
ANION GAP SERPL CALCULATED.3IONS-SCNC: 8.3 MMOL/L (ref 5–15)
AST SERPL-CCNC: 17 U/L (ref 1–40)
BASOPHILS # BLD AUTO: 0.03 10*3/MM3 (ref 0–0.2)
BASOPHILS NFR BLD AUTO: 0.4 % (ref 0–1.5)
BILIRUB SERPL-MCNC: 0.4 MG/DL (ref 0–1.2)
BUN SERPL-MCNC: 8 MG/DL (ref 6–20)
BUN/CREAT SERPL: 9.1 (ref 7–25)
CALCIUM SPEC-SCNC: 9.3 MG/DL (ref 8.6–10.5)
CHLORIDE SERPL-SCNC: 103 MMOL/L (ref 98–107)
CO2 SERPL-SCNC: 25.7 MMOL/L (ref 22–29)
CREAT SERPL-MCNC: 0.88 MG/DL (ref 0.76–1.27)
DEPRECATED RDW RBC AUTO: 42.7 FL (ref 37–54)
EOSINOPHIL # BLD AUTO: 0.3 10*3/MM3 (ref 0–0.4)
EOSINOPHIL NFR BLD AUTO: 4.4 % (ref 0.3–6.2)
ERYTHROCYTE [DISTWIDTH] IN BLOOD BY AUTOMATED COUNT: 12.5 % (ref 12.3–15.4)
GFR SERPL CREATININE-BSD FRML MDRD: 90 ML/MIN/1.73
GLOBULIN UR ELPH-MCNC: 2.9 GM/DL
GLUCOSE SERPL-MCNC: 111 MG/DL (ref 65–99)
HCT VFR BLD AUTO: 42.9 % (ref 37.5–51)
HGB BLD-MCNC: 14.3 G/DL (ref 13–17.7)
IMM GRANULOCYTES # BLD AUTO: 0.01 10*3/MM3 (ref 0–0.05)
IMM GRANULOCYTES NFR BLD AUTO: 0.1 % (ref 0–0.5)
LYMPHOCYTES # BLD AUTO: 1.91 10*3/MM3 (ref 0.7–3.1)
LYMPHOCYTES NFR BLD AUTO: 28.1 % (ref 19.6–45.3)
MCH RBC QN AUTO: 30.9 PG (ref 26.6–33)
MCHC RBC AUTO-ENTMCNC: 33.3 G/DL (ref 31.5–35.7)
MCV RBC AUTO: 92.7 FL (ref 79–97)
MONOCYTES # BLD AUTO: 0.63 10*3/MM3 (ref 0.1–0.9)
MONOCYTES NFR BLD AUTO: 9.3 % (ref 5–12)
NEUTROPHILS NFR BLD AUTO: 3.91 10*3/MM3 (ref 1.7–7)
NEUTROPHILS NFR BLD AUTO: 57.7 % (ref 42.7–76)
NRBC BLD AUTO-RTO: 0 /100 WBC (ref 0–0.2)
PLATELET # BLD AUTO: 228 10*3/MM3 (ref 140–450)
PMV BLD AUTO: 9.8 FL (ref 6–12)
POTASSIUM SERPL-SCNC: 4.3 MMOL/L (ref 3.5–5.2)
PROT SERPL-MCNC: 7 G/DL (ref 6–8.5)
RBC # BLD AUTO: 4.63 10*6/MM3 (ref 4.14–5.8)
SODIUM SERPL-SCNC: 137 MMOL/L (ref 136–145)
WBC # BLD AUTO: 6.79 10*3/MM3 (ref 3.4–10.8)

## 2021-06-02 PROCEDURE — 85025 COMPLETE CBC W/AUTO DIFF WBC: CPT | Performed by: INTERNAL MEDICINE

## 2021-06-02 PROCEDURE — 80053 COMPREHEN METABOLIC PANEL: CPT | Performed by: INTERNAL MEDICINE

## 2021-06-02 PROCEDURE — 99213 OFFICE O/P EST LOW 20 MIN: CPT | Performed by: NURSE PRACTITIONER

## 2021-06-02 NOTE — PROGRESS NOTES
Name:  Kennedy Reardon  :  1965  Date:  2021     REFERRING PHYSICIAN  Gilbert Harrell MD    PRIMARY CARE PHYSICIAN  Feliberto Moore MD    REASON FOR FOLLOWUP  1. Glioma (CMS/HCC)      CHIEF COMPLAINT  None.    Dear Dr. Harrell,    HISTORY OF PRESENT ILLNESS:   I saw Mr. Reardon in follow up today in our medical oncology clinic. As you are aware, he is a pleasant, 56 y.o., white male with a history of minimal medical problems who was in his usual state of health until late 2020 when he suffered a couple of seizures a few days apart. He presented to our ED following the second episode and was med-flighted to [facility] due to concerns that he was suffering a stroke. Ultimately, an MRI of the brain identified a cystic mass in the left temporoparietal region; and you took him to the OR on 2020. The final, surgical pathology was consistent with glioblastoma multiforme. He recovered uneventfully from his craniotomy, and he was subsequently referred to our clinic for assistance with his management closer to his home in [place], [place].     INTERIM HISTORY:  Mr. Reardon presents today for follow up accompanied by his wife. He completed concomitant, daily PO Temodar and XRT on 2020 and tolerated this regimen well, with mild nausea as his only reported noticeable side effects. He was subsequently agreeable to transitioning to qmonthly, PO Temodar alone and began this regimen on 2021 (taking the Temodar from -15). He has now completed a total of five (5) cycles and continues to tolerate this treatment well, with mild, but very tolerable, nausea for the first couple of days of each cycle (he has still not even had to take any antiemetics). He reports a good appetite and hydrating well. He denies any specific complaints today.    Past Medical History:   Diagnosis Date   • Brain cancer (CMS/HCC)    • History of radiation therapy 2020    left temporal lobe of brain   •  Hyperlipidemia    • Hypertension    • Medical history reviewed with no changes    • Seizures (CMS/HCC)        Past Surgical History:   Procedure Laterality Date   • CRANIOTOMY FOR TUMOR Left 8/28/2020    Procedure: CRANIOTOMY FOR TUMOR LEFT;  Surgeon: Gilbert Harrell MD;  Location: [facility];  Service: Neurosurgery;  Laterality: Left;       Social History     Socioeconomic History   • Marital status:      Spouse name: Not on file   • Number of children: Not on file   • Years of education: Not on file   • Highest education level: Not on file   Tobacco Use   • Smoking status: Current Every Day Smoker     Packs/day: 0.50   • Smokeless tobacco: Never Used   Vaping Use   • Vaping Use: Never used   Substance and Sexual Activity   • Alcohol use: No     Comment: few beers every now and then   • Drug use: Defer   • Sexual activity: Defer       Family History   Problem Relation Age of Onset   • Osteoarthritis Mother    • Rheum arthritis Mother    • Hypertension Mother    • Cancer Father    • Osteoporosis Sister    • Osteoarthritis Maternal Grandfather    • Rheum arthritis Maternal Grandfather    • Heart disease Paternal Grandmother    • Cancer Paternal Grandfather    • Heart disease Paternal Grandfather    • Diabetes Paternal Grandfather        No Known Allergies     .ECOG score: 0               Current Outpatient Medications   Medication Sig Dispense Refill   • aspirin 81 MG chewable tablet Chew 81 mg Daily.     • atenolol (TENORMIN) 50 MG tablet Take 50 mg by mouth Daily.     • cetirizine (zyrTEC) 10 MG tablet Take 10 mg by mouth Daily.     • docusate sodium 100 MG capsule Take 1capsule by mouth 2 (Two) Times a Day As Needed for Constipation. 30 each 2   • levETIRAcetam (KEPPRA) 500 MG tablet Take 1 tablet by mouth Every 12 (Twelve) Hours. 180 tablet 4   • losartan (COZAAR) 50 MG tablet Take 50 mg by mouth Daily.     • lovastatin (MEVACOR) 20 MG tablet Take 20 mg by mouth 2 (two) times a day.     • ondansetron  (ZOFRAN) 4 MG tablet Take 1 tablet by mouth Every 8 (Eight) Hours As Needed for Nausea or Vomiting. 90 tablet 5   • prochlorperazine (COMPAZINE) 10 MG tablet Take 1 tablet by mouth Every 8 (Eight) Hours As Needed for Nausea or Vomiting. 90 tablet 5   • sulfamethoxazole-trimethoprim (BACTRIM DS,SEPTRA DS) 800-160 MG per tablet Take 1 tablet by mouth 3 (Three) Times a Week. Take 1 tablet on ,  and . 12 tablet 7     No current facility-administered medications for this visit.     REVIEW OF SYSTEMS  CONSTITUTIONAL:  No fever, chills or night sweats.  EYES:  No blurry vision, diplopia or other vision changes. Improved photosensitivity, no longer wearing sunglasses indoors (as he was doing at the time of his initial consultation).  ENT:  No hearing loss, nosebleeds or sore throat.  CARDIOVASCULAR:  No palpitations, arrhythmia, syncopal episodes or edema.  PULMONARY:  No hemoptysis, wheezing, chronic cough or shortness of breath.  GASTROINTESTINAL:  No constipation or diarrhea. No abdominal pain. Mild, nausea for the first couple of days after starting qmonthly, PO Temodar, as per the HPI above.  GENITOURINARY:  No hematuria, kidney stones or frequent urination.  MUSCULOSKELETAL:  No joint or back pains.  INTEGUMENTARY: No rashes or pruritus.  ENDOCRINE:  No excessive thirst or hot flashes.  HEMATOLOGIC:  No history of free bleeding, spontaneous bleeding or clotting.  IMMUNOLOGIC:  No allergies or frequent infections.  NEUROLOGIC: As per the HPI above.  PSYCHIATRIC:  No anxiety or depression.    PHYSICAL EXAMINATION  /78   Pulse 80   Temp 98.4 °F (36.9 °C) (Temporal)   Resp 18   Wt 102 kg (225 lb 3.2 oz)   SpO2 97%   BMI 34.24 kg/m²     Pain Score:  Pain Score    21 1405   PainSc: 0-No pain     PHQ-Score Total:  PHQ-9 Total Score:      ECO  GENERAL:  A well-developed, well-nourished, white male in no acute distress.  HEENT:  Pupils equally round reactive to light. Extraocular  muscles intact. Resolved alopecia.  CARDIOVASCULAR:  Regular rate and rhythm. No murmurs, gallops or rubs.  LUNGS:  Clear to auscultation bilaterally.  ABDOMEN:  Soft, nontender, nondistended with positive bowel sounds.  EXTREMITIES:  No clubbing, cyanosis or edema bilaterally.  SKIN:  No rashes or petechiae.  NEURO:  No focal deficits.  PSYCH:  Alert and oriented x3.    LABORATORY  Lab Results   Component Value Date    WBC 6.79 06/02/2021    HGB 14.3 06/02/2021    HCT 42.9 06/02/2021    MCV 92.7 06/02/2021     06/02/2021    NEUTROABS 3.91 06/02/2021       Lab Results   Component Value Date     05/05/2021    K 4.5 05/05/2021     05/05/2021    CO2 24.8 05/05/2021    BUN 9 05/05/2021    CREATININE 0.90 05/05/2021    GLUCOSE 127 (H) 05/05/2021    CALCIUM 9.1 05/05/2021    AST 18 05/05/2021    ALT 27 05/05/2021    ALKPHOS 89 05/05/2021    BILITOT 0.3 05/05/2021    PROTEINTOT 7.3 05/05/2021    ALBUMIN 4.26 05/05/2021       CBC (05/05/2021): WBCs: 7.1; HgB: 15.5; Hct: 46.4; platelets: 208  CBC (03/30/2021): WBCs: 7.4; HgB: 14.9; Hct: 44.1; platelets: 240  CBC (03/03/2021): WBCs: 7.4; HgB: 15.1; Hct: 45.5; platelets: 244  CBC (02/03/2021): WBCs: 7.0; HgB: 15.3; Hct: 45.3; platelets: 244  CBC (01/04/2021): WBCs: 7.4; HgB: 15.0; Hct: 45.5; platelets: 182  CBC (11/25/2020): WBCs: 7.8; HgB: 15.0; Hct: 45.0; platelets: 258    IMAGING  MRI brain with contrast (08/27/2020):  Impression: Large cystic mass identified in the left temporoparietal region with surgical markers in place for surgical planning. The largest axial dimension of the nodular component is 2.5 x 1.6 cm.    MRI brain with and without contrast (08/31/2020):  Impression: Postsurgical changes left temporal craniotomy and resection with expected early postoperative changes including dural enhancement mildly prominent along the left inferolateral margin of the resection site with attention on followup otherwise, no residual soft tissue nodular  enhancement or mass-occupying focus of enhancement. No midline shift or hydrocephalus.    MRI brain with and without contrast (10/01/2020):  Impression: T2 signal abnormality surrounding a left temporal mass measuring approximately 1.8 x 2.9 cm that shows predominantly peripheral enhancement. Some postsurgical changes noted overlying this region, but no prior examination is available for comparison.    MRI brain without contrast (12/03/2020):  Impression: The left temporal lobe lesion is slightly smaller now with several cystic areas in the lesion. There is less edema than on the previous exam in 10/2020.    MRI brain with and without contrast (02/04/2021):  Impression: [Remains stable per neurosurgery interpretation.]    MRI brain with and without contrast (04/01/2021):  Impression:  1) Based on retrospective remeasurement, no interval change in size of enhancing mass left temporal lobe with cystic components and no change in the degree of peritumoral signal abnormality/vasogenic edema. Today's measurements are 3.0 x 2.4 cm.  2) Localized mass effect but no hydrocephalus or midline shift.  3) No new enhancing brain lesions identified.    MRI brain with and without contrast (04/29/2021):  Impression: Again there is a peripherally enhancing mass in the right temporal lobe again measuring about 2.1 x 2.6 cm. Again there is surrounding vasogenic edema.    PATHOLOGY  Temporal lobe mass (08/28/2020):  Glioblastoma multiforme. IDH1/IDH2 mutation not detected. 1p/19q co-deletion not detected. MGMT gene promotor methylation: Detected.    IMPRESSION AND PLAN  Mr. Reardon is a 56 y.o., white male with:  1. Glioblastoma multiforme: Initially diagnosed in late August 2020 following a couple of seizure events a few days apart and status post craniotomy with resection of a left, temporoparietal region, cystic mass on 08/27/2020. The final surgical pathology results are summarized above, and he recovered well from this procedure.  Dr. Casey has had multiple, long discussions with the patient (+/- his wife) since the time of his initial consultation in our clinic (on 09/30/2020) regarding this diagnosis and its prognosis, reiterating much of what they were previously told by [facility] neurosurgery. They remain aware that his diagnosis is, unfortunately, ultimately very poor; however, this malignancy was, and remains, potentially treatable and sustained remissions are possible. Adjuvant, concomitant chemotherapy and radiation was recommended. He began a ~six-week course of this regimen (with temozolomide 75 mg/m2 daily with XRT; patient dose: 160 mg) by mid-October 2020 and completed it by early December 2020, overall tolerating this regimen well, with some mild, manageable nausea as his only noticeable side effect. He had a repeat MRI with [facility] neurosurgery on 12/03/2020, following the conclusion of his chemo/XRT, which showed overall improvement (summarized above). Subsequently, he was felt to be a good candidate to transition to adjuvant, qmonthly PO Temodar alone (150 mg/m2 on days 1-5 of 28-day cycles; patient dose: 320 mg). He began this qmonthly therapy in mid-January 2021. He has now completed ~five (5) cycles (most recently taking the Temodar from 05/10-14); and he again reports today that he continues to tolerate this regimen overall well (with some mild, manageable nausea for the first couple of days as his only noticeable side effect; he has still not had to take any antiemetics). Meanwhile, the most recent repeat MRI of the brain (performed on 04/29/2021 and summarized above) remained (per neurosurgery) overall stable. We will proceed with this current, adjuvant treatment plan (day #1 of cycle #6 scheduled for this coming Monday, 06/07). We will see him back in our clinic in one month, just prior to the start of the potential seventh cycle, respectively, of qmonthly, PO Temodar, with repeat CBC and CMP for  additional toxicity/symptom/lab check.  2. Health maintenance: The patient (and his wife) have completed COVID-19 vaccination series.  The patient and his wife were in agreement with these plans.    It is a pleasure to participate in Mr. Reardon's care. Please do not hesitate to call with any questions or concerns that you may have.    A total of 25 minutes were spent coordinating this patient’s care in clinic today; more than 50% of this time was face-to-face with the patient and his wife, reviewing his interim medical history, discussing the results of recent labwork and counseling on the current treatment and followup plan. All questions were answered to their satisfaction.    FOLLOW UP  Proceed with adjuvant, qmonthly, PO Temodar alone, as planned (day #1 of cycle #6 scheduled for this coming Monday, 06/07/2021). With neurosurgery with a repeat MRI of the brain in late June, as previously planned. Return to our clinic in 1 month, just prior to the start of the 7th cycle of qmonthly Temodar, respectively, with CBC and CMP.            This document was electronically signed by ORTIZ Lopez June 2, 2021 14:43 EDT      CC: MD Zena Talley MD James W. Killian, MD

## 2021-06-07 ENCOUNTER — APPOINTMENT (OUTPATIENT)
Dept: CT IMAGING | Facility: HOSPITAL | Age: 56
End: 2021-06-07

## 2021-06-07 ENCOUNTER — TELEPHONE (OUTPATIENT)
Dept: ONCOLOGY | Facility: CLINIC | Age: 56
End: 2021-06-07

## 2021-06-07 ENCOUNTER — HOSPITAL ENCOUNTER (EMERGENCY)
Facility: HOSPITAL | Age: 56
Discharge: HOME | End: 2021-06-07
Attending: STUDENT IN AN ORGANIZED HEALTH CARE EDUCATION/TRAINING PROGRAM | Admitting: STUDENT IN AN ORGANIZED HEALTH CARE EDUCATION/TRAINING PROGRAM

## 2021-06-07 VITALS
BODY MASS INDEX: 34.1 KG/M2 | DIASTOLIC BLOOD PRESSURE: 72 MMHG | RESPIRATION RATE: 18 BRPM | TEMPERATURE: 97.9 F | SYSTOLIC BLOOD PRESSURE: 149 MMHG | HEART RATE: 87 BPM | OXYGEN SATURATION: 94 % | WEIGHT: 225 LBS | HEIGHT: 68 IN

## 2021-06-07 DIAGNOSIS — K52.9 GASTROENTERITIS: Primary | ICD-10-CM

## 2021-06-07 LAB
ALBUMIN SERPL-MCNC: 4.41 G/DL (ref 3.5–5.2)
ALBUMIN/GLOB SERPL: 1.4 G/DL
ALP SERPL-CCNC: 91 U/L (ref 39–117)
ALT SERPL W P-5'-P-CCNC: 18 U/L (ref 1–41)
ANION GAP SERPL CALCULATED.3IONS-SCNC: 12.9 MMOL/L (ref 5–15)
AST SERPL-CCNC: 16 U/L (ref 1–40)
BASOPHILS # BLD AUTO: 0.03 10*3/MM3 (ref 0–0.2)
BASOPHILS NFR BLD AUTO: 0.3 % (ref 0–1.5)
BILIRUB SERPL-MCNC: 0.5 MG/DL (ref 0–1.2)
BUN SERPL-MCNC: 8 MG/DL (ref 6–20)
BUN/CREAT SERPL: 8.4 (ref 7–25)
CALCIUM SPEC-SCNC: 10.1 MG/DL (ref 8.6–10.5)
CHLORIDE SERPL-SCNC: 96 MMOL/L (ref 98–107)
CO2 SERPL-SCNC: 26.1 MMOL/L (ref 22–29)
CREAT SERPL-MCNC: 0.95 MG/DL (ref 0.76–1.27)
DEPRECATED RDW RBC AUTO: 41.1 FL (ref 37–54)
EOSINOPHIL # BLD AUTO: 0.41 10*3/MM3 (ref 0–0.4)
EOSINOPHIL NFR BLD AUTO: 4.6 % (ref 0.3–6.2)
ERYTHROCYTE [DISTWIDTH] IN BLOOD BY AUTOMATED COUNT: 12.4 % (ref 12.3–15.4)
GFR SERPL CREATININE-BSD FRML MDRD: 82 ML/MIN/1.73
GLOBULIN UR ELPH-MCNC: 3.2 GM/DL
GLUCOSE SERPL-MCNC: 137 MG/DL (ref 65–99)
HCT VFR BLD AUTO: 44.3 % (ref 37.5–51)
HGB BLD-MCNC: 15.4 G/DL (ref 13–17.7)
IMM GRANULOCYTES # BLD AUTO: 0.02 10*3/MM3 (ref 0–0.05)
IMM GRANULOCYTES NFR BLD AUTO: 0.2 % (ref 0–0.5)
LYMPHOCYTES # BLD AUTO: 1.08 10*3/MM3 (ref 0.7–3.1)
LYMPHOCYTES NFR BLD AUTO: 12.1 % (ref 19.6–45.3)
MCH RBC QN AUTO: 31.3 PG (ref 26.6–33)
MCHC RBC AUTO-ENTMCNC: 34.8 G/DL (ref 31.5–35.7)
MCV RBC AUTO: 90 FL (ref 79–97)
MONOCYTES # BLD AUTO: 0.6 10*3/MM3 (ref 0.1–0.9)
MONOCYTES NFR BLD AUTO: 6.7 % (ref 5–12)
NEUTROPHILS NFR BLD AUTO: 6.8 10*3/MM3 (ref 1.7–7)
NEUTROPHILS NFR BLD AUTO: 76.1 % (ref 42.7–76)
NRBC BLD AUTO-RTO: 0 /100 WBC (ref 0–0.2)
PLATELET # BLD AUTO: 234 10*3/MM3 (ref 140–450)
PMV BLD AUTO: 9.9 FL (ref 6–12)
POTASSIUM SERPL-SCNC: 4.3 MMOL/L (ref 3.5–5.2)
PROT SERPL-MCNC: 7.6 G/DL (ref 6–8.5)
RBC # BLD AUTO: 4.92 10*6/MM3 (ref 4.14–5.8)
SODIUM SERPL-SCNC: 135 MMOL/L (ref 136–145)
WBC # BLD AUTO: 8.94 10*3/MM3 (ref 3.4–10.8)

## 2021-06-07 PROCEDURE — 25010000002 IOPAMIDOL 61 % SOLUTION: Performed by: STUDENT IN AN ORGANIZED HEALTH CARE EDUCATION/TRAINING PROGRAM

## 2021-06-07 PROCEDURE — 99283 EMERGENCY DEPT VISIT LOW MDM: CPT

## 2021-06-07 PROCEDURE — 96374 THER/PROPH/DIAG INJ IV PUSH: CPT

## 2021-06-07 PROCEDURE — 96375 TX/PRO/DX INJ NEW DRUG ADDON: CPT

## 2021-06-07 PROCEDURE — 25010000002 ONDANSETRON PER 1 MG: Performed by: PHYSICIAN ASSISTANT

## 2021-06-07 PROCEDURE — 74177 CT ABD & PELVIS W/CONTRAST: CPT

## 2021-06-07 PROCEDURE — 85025 COMPLETE CBC W/AUTO DIFF WBC: CPT | Performed by: PHYSICIAN ASSISTANT

## 2021-06-07 PROCEDURE — 80053 COMPREHEN METABOLIC PANEL: CPT | Performed by: PHYSICIAN ASSISTANT

## 2021-06-07 PROCEDURE — 25010000002 PROCHLORPERAZINE 10 MG/2ML SOLUTION: Performed by: PHYSICIAN ASSISTANT

## 2021-06-07 PROCEDURE — 74177 CT ABD & PELVIS W/CONTRAST: CPT | Performed by: RADIOLOGY

## 2021-06-07 RX ORDER — ONDANSETRON 2 MG/ML
4 INJECTION INTRAMUSCULAR; INTRAVENOUS ONCE
Status: COMPLETED | OUTPATIENT
Start: 2021-06-07 | End: 2021-06-07

## 2021-06-07 RX ORDER — ONDANSETRON 4 MG/1
4 TABLET, ORALLY DISINTEGRATING ORAL EVERY 6 HOURS PRN
Qty: 12 TABLET | Refills: 0 | Status: ON HOLD | OUTPATIENT
Start: 2021-06-07 | End: 2022-01-01

## 2021-06-07 RX ORDER — PROCHLORPERAZINE EDISYLATE 5 MG/ML
10 INJECTION INTRAMUSCULAR; INTRAVENOUS ONCE
Status: COMPLETED | OUTPATIENT
Start: 2021-06-07 | End: 2021-06-07

## 2021-06-07 RX ADMIN — PROCHLORPERAZINE EDISYLATE 10 MG: 5 INJECTION INTRAMUSCULAR; INTRAVENOUS at 14:05

## 2021-06-07 RX ADMIN — IOPAMIDOL 83 ML: 612 INJECTION, SOLUTION INTRAVENOUS at 13:32

## 2021-06-07 RX ADMIN — ONDANSETRON 4 MG: 2 INJECTION INTRAMUSCULAR; INTRAVENOUS at 11:08

## 2021-06-07 RX ADMIN — SODIUM CHLORIDE 1000 ML: 9 INJECTION, SOLUTION INTRAVENOUS at 10:59

## 2021-06-07 RX ADMIN — SODIUM CHLORIDE 1000 ML: 9 INJECTION, SOLUTION INTRAVENOUS at 14:06

## 2021-06-07 NOTE — ED PROVIDER NOTES
Subjective   pt is undergoing treatment for brain mass. Supposed to be getting chemo today but has n/v for 2 days.   No abd surgeries      History provided by:  Patient   used: No    Abdominal Pain  Pain location:  Generalized  Pain quality: aching    Pain radiates to:  Does not radiate  Pain severity:  Moderate  Onset quality:  Sudden  Duration:  2 days  Timing:  Constant  Progression:  Worsening  Chronicity:  New  Relieved by:  Nothing  Worsened by:  Nothing  Ineffective treatments:  None tried  Associated symptoms: nausea and vomiting    Associated symptoms: no chest pain, no chills, no cough, no diarrhea, no dysuria, no fever, no shortness of breath and no sore throat        Review of Systems   Constitutional: Negative for chills and fever.   HENT: Negative for congestion, ear pain and sore throat.    Respiratory: Negative for cough, shortness of breath and wheezing.    Cardiovascular: Negative for chest pain.   Gastrointestinal: Positive for abdominal pain, nausea and vomiting. Negative for diarrhea.   Genitourinary: Negative for dysuria and flank pain.   Skin: Negative for rash.   Neurological: Negative for headaches.   Psychiatric/Behavioral: The patient is not nervous/anxious.    All other systems reviewed and are negative.      Past Medical History:   Diagnosis Date   • Brain cancer (CMS/HCC)    • History of radiation therapy 12/02/2020    left temporal lobe of brain   • Hyperlipidemia    • Hypertension    • Medical history reviewed with no changes    • Seizures (CMS/HCC)        No Known Allergies    Past Surgical History:   Procedure Laterality Date   • CRANIOTOMY FOR TUMOR Left 8/28/2020    Procedure: CRANIOTOMY FOR TUMOR LEFT;  Surgeon: Gilbert Harrell MD;  Location: [facility];  Service: Neurosurgery;  Laterality: Left;       Family History   Problem Relation Age of Onset   • Osteoarthritis Mother    • Rheum arthritis Mother    • Hypertension Mother    • Cancer Father    •  Osteoporosis Sister    • Osteoarthritis Maternal Grandfather    • Rheum arthritis Maternal Grandfather    • Heart disease Paternal Grandmother    • Cancer Paternal Grandfather    • Heart disease Paternal Grandfather    • Diabetes Paternal Grandfather        Social History     Socioeconomic History   • Marital status:      Spouse name: Not on file   • Number of children: Not on file   • Years of education: Not on file   • Highest education level: Not on file   Tobacco Use   • Smoking status: Current Every Day Smoker     Packs/day: 0.50   • Smokeless tobacco: Never Used   Vaping Use   • Vaping Use: Never used   Substance and Sexual Activity   • Alcohol use: No     Comment: few beers every now and then   • Drug use: Defer   • Sexual activity: Defer           Objective   Physical Exam  Vitals and nursing note reviewed.   Constitutional:       Appearance: He is well-developed.   HENT:      Head: Normocephalic.   Cardiovascular:      Rate and Rhythm: Normal rate and regular rhythm.   Pulmonary:      Effort: Pulmonary effort is normal.      Breath sounds: Normal breath sounds.   Abdominal:      General: Bowel sounds are normal.      Palpations: Abdomen is soft.      Tenderness: There is no abdominal tenderness. There is no guarding or rebound.   Musculoskeletal:         General: Normal range of motion.      Cervical back: Neck supple.   Skin:     General: Skin is warm and dry.   Neurological:      Mental Status: He is alert and oriented to person, place, and time.   Psychiatric:         Behavior: Behavior normal.         Thought Content: Thought content normal.         Judgment: Judgment normal.         Procedures           ED Course  ED Course as of Jun 09 1057   Mon Jun 07, 2021   1423 Called Tip no answer     [LC]   3830 Pt is feeling better after iv fluids given in ER> Patient was discussed with Dr. Rader, pt can resume chemo pills in next few days.   Return to ER if worsening headache. No MRI needed  at this time.     [LC]      ED Course User Index  [LC] Maureen Olvera PA                                           [facility]    Final diagnoses:   Gastroenteritis       ED Disposition  ED Disposition     ED Disposition Condition Comment    Discharge Stable           Feliberto Moore MD  [address] [place] [address] [phone]    In 2 days           Medication List      New Prescriptions    ondansetron ODT 4 MG disintegrating tablet  Commonly known as: ZOFRAN-ODT  Place 1 tablet on the tongue Every 6 (Six) Hours As Needed for Nausea or Vomiting for up to 12 doses.        ASK your doctor about these medications    * temozolomide 140 MG chemo capsule  Commonly known as: TEMODAR  Take 1 capsule by mouth with 1 other temozolomide prescription for 320 mg total Daily for 5 days. Take 140 mg + 180 mg capsule on days 1-5 of each cycle.  Ask about: Should I take this medication?     * temozolomide 180 MG chemo capsule  Commonly known as: TEMODAR  Take 1 capsule by mouth with 1 other temozolomide prescription for 320 mg total Daily for 5 days. Take 140 mg + 180 mg capsule on days 1-5 of each cycle.  Ask about: Should I take this medication?         * This list has 2 medication(s) that are the same as other medications prescribed for you. Read the directions carefully, and ask your doctor or other care provider to review them with you.               Where to Get Your Medications      These medications were sent to [facility] [address] [place], [place] [address] [place] [address] - [phone]  - [phone] FX  [address] [address]    Phone: [phone]   · ondansetron ODT 4 MG disintegrating tablet          Maureen Olvera PA  06/09/21 0687

## 2021-06-07 NOTE — TELEPHONE ENCOUNTER
Caller: CINTHIA    Relationship: PT WIFE ON [employer]    Best call back number: [phone]    What was the call regarding:   PT WAS UP SICK ALL NIGHT AND IS SUPPOSED TO START HIS CHEMO PILL TODAY. CINTHIA WANTING TO KNOW IF THEY CAN HOLD OFF A DAY OR TWO    Do you require a callback: YES

## 2021-06-07 NOTE — TELEPHONE ENCOUNTER
Called & spoke with patient's spouse Delia. Spouse stated that patient doesn't want to start his chemo pill due to having nausea, vomiting, & abdominal pain since Saturday. Spouse states that she is going to take patient to ER to be evaluated. Dr. Prasanth kuhn MD stated for patient to start chemo pill as soon as he feels he is able to. Discussed MD's recommendations with spouse, encouraged her to call back for any further needs. Pt's spouse verbalized understanding at this time.

## 2021-06-09 ENCOUNTER — TELEPHONE (OUTPATIENT)
Dept: ONCOLOGY | Facility: CLINIC | Age: 56
End: 2021-06-09

## 2021-06-09 DIAGNOSIS — C71.9 GLIOMA (HCC): Primary | ICD-10-CM

## 2021-06-09 NOTE — TELEPHONE ENCOUNTER
Caller: [place]    Relationship: [facility]    Best call back number: [phone]    What orders are you requesting (i.e. lab or imaging): UPDATED BRAIN MRI    In what timeframe would the patient need to come in: BEFORE PT 6/17 APPT

## 2021-06-17 ENCOUNTER — APPOINTMENT (OUTPATIENT)
Dept: MRI IMAGING | Facility: HOSPITAL | Age: 56
End: 2021-06-17

## 2021-06-25 ENCOUNTER — OFFICE VISIT (OUTPATIENT)
Dept: NEUROSURGERY | Facility: CLINIC | Age: 56
End: 2021-06-25

## 2021-06-25 ENCOUNTER — HOSPITAL ENCOUNTER (OUTPATIENT)
Dept: MRI IMAGING | Facility: HOSPITAL | Age: 56
Discharge: HOME | End: 2021-06-25
Admitting: PHYSICIAN ASSISTANT

## 2021-06-25 VITALS
BODY MASS INDEX: 33.25 KG/M2 | WEIGHT: 219.4 LBS | SYSTOLIC BLOOD PRESSURE: 129 MMHG | DIASTOLIC BLOOD PRESSURE: 79 MMHG | HEIGHT: 68 IN | TEMPERATURE: 98.8 F

## 2021-06-25 DIAGNOSIS — C71.9 GLIOMA (HCC): ICD-10-CM

## 2021-06-25 DIAGNOSIS — G93.89 BRAIN MASS: Primary | ICD-10-CM

## 2021-06-25 PROCEDURE — 25010000002 GADOTERATE MEGLUMINE 10 MMOL/20ML SOLUTION: Performed by: PHYSICIAN ASSISTANT

## 2021-06-25 PROCEDURE — 99213 OFFICE O/P EST LOW 20 MIN: CPT | Performed by: PHYSICIAN ASSISTANT

## 2021-06-25 PROCEDURE — 70553 MRI BRAIN STEM W/O & W/DYE: CPT | Performed by: RADIOLOGY

## 2021-06-25 PROCEDURE — 70553 MRI BRAIN STEM W/O & W/DYE: CPT

## 2021-06-25 PROCEDURE — A9575 INJ GADOTERATE MEGLUMI 0.1ML: HCPCS | Performed by: PHYSICIAN ASSISTANT

## 2021-06-25 RX ORDER — GADOTERATE MEGLUMINE 376.9 MG/ML
20 INJECTION INTRAVENOUS
Status: COMPLETED | OUTPATIENT
Start: 2021-06-25 | End: 2021-06-25

## 2021-06-25 RX ADMIN — GADOTERATE MEGLUMINE 20 ML: 376.9 INJECTION, SOLUTION INTRAVENOUS at 13:53

## 2021-06-25 NOTE — PROGRESS NOTES
Specialty Pharmacy Note      Name:  Kennedy Reardon  :  1965  Date:  2021         Past Medical History:   Diagnosis Date   • Brain cancer (CMS/HCC)    • History of radiation therapy 2020    left temporal lobe of brain   • Hyperlipidemia    • Hypertension    • Medical history reviewed with no changes    • Seizures (CMS/HCC)        Past Surgical History:   Procedure Laterality Date   • CRANIOTOMY FOR TUMOR Left 2020    Procedure: CRANIOTOMY FOR TUMOR LEFT;  Surgeon: Gilbert Harrell MD;  Location: [facility];  Service: Neurosurgery;  Laterality: Left;       Social History     Socioeconomic History   • Marital status:      Spouse name: Not on file   • Number of children: Not on file   • Years of education: Not on file   • Highest education level: Not on file   Tobacco Use   • Smoking status: Current Every Day Smoker     Packs/day: 0.50   • Smokeless tobacco: Never Used   Vaping Use   • Vaping Use: Never used   Substance and Sexual Activity   • Alcohol use: No     Comment: few beers every now and then   • Drug use: Defer   • Sexual activity: Defer       Family History   Problem Relation Age of Onset   • Osteoarthritis Mother    • Rheum arthritis Mother    • Hypertension Mother    • Cancer Father    • Osteoporosis Sister    • Osteoarthritis Maternal Grandfather    • Rheum arthritis Maternal Grandfather    • Heart disease Paternal Grandmother    • Cancer Paternal Grandfather    • Heart disease Paternal Grandfather    • Diabetes Paternal Grandfather        No Known Allergies    Current Outpatient Medications   Medication Sig Dispense Refill   • aspirin 81 MG chewable tablet Chew 81 mg Daily.     • atenolol (TENORMIN) 50 MG tablet Take 50 mg by mouth Daily.     • cetirizine (zyrTEC) 10 MG tablet Take 10 mg by mouth Daily.     • docusate sodium 100 MG capsule Take 1capsule by mouth 2 (Two) Times a Day As Needed for Constipation. 30 each 2   • levETIRAcetam (KEPPRA) 500 MG tablet Take 1  tablet by mouth Every 12 (Twelve) Hours. 180 tablet 4   • losartan (COZAAR) 50 MG tablet Take 50 mg by mouth Daily.     • lovastatin (MEVACOR) 20 MG tablet Take 20 mg by mouth 2 (two) times a day.     • ondansetron ODT (ZOFRAN-ODT) 4 MG disintegrating tablet Place 1 tablet on the tongue Every 6 (Six) Hours As Needed for Nausea or Vomiting for up to 12 doses. 12 tablet 0   • prochlorperazine (COMPAZINE) 10 MG tablet Take 1 tablet by mouth Every 8 (Eight) Hours As Needed for Nausea or Vomiting. 90 tablet 5   • sulfamethoxazole-trimethoprim (BACTRIM DS,SEPTRA DS) 800-160 MG per tablet Take 1 tablet by mouth 3 (Three) Times a Week. Take 1 tablet on Mondays, Wednesdays and Fridays. 12 tablet 7   • temozolomide (TEMODAR) 140 MG chemo capsule Take 1 capsule by mouth with 1 other temozolomide prescription for 320 mg total Daily for 5 days. Take 140 mg + 180 mg capsule on days 1-5 of each cycle. 5 capsule 5   • temozolomide (TEMODAR) 180 MG chemo capsule Take 1 capsule by mouth with 1 other temozolomide prescription for 320 mg total Daily for 5 days. Take 140 mg + 180 mg capsule on days 1-5 of each cycle. 5 capsule 5     No current facility-administered medications for this visit.         LABORATORY:    Lab Results   Component Value Date    WBC 8.94 06/07/2021    HGB 15.4 06/07/2021    HCT 44.3 06/07/2021    MCV 90.0 06/07/2021    RDW 12.4 06/07/2021     06/07/2021    NEUTRORELPCT 76.1 (H) 06/07/2021    LYMPHORELPCT 12.1 (L) 06/07/2021    MONORELPCT 6.7 06/07/2021    EOSRELPCT 4.6 06/07/2021    BASORELPCT 0.3 06/07/2021    NEUTROABS 6.80 06/07/2021    LYMPHSABS 1.08 06/07/2021       Lab Results   Component Value Date     (L) 06/07/2021    K 4.3 06/07/2021    CO2 26.1 06/07/2021    CL 96 (L) 06/07/2021    BUN 8 06/07/2021    CREATININE 0.95 06/07/2021    GLUCOSE 137 (H) 06/07/2021    CALCIUM 10.1 06/07/2021    ALKPHOS 91 06/07/2021    AST 16 06/07/2021    ALT 18 06/07/2021    BILITOT 0.5 06/07/2021    ALBUMIN  4.41 06/07/2021    PROTEINTOT 7.6 06/07/2021    PHOS 4.1 08/30/2020       No results found for: LDH, URICACID     Imaging Results (Last 24 Hours)     ** No results found for the last 24 hours. **          ASSESSMENT/PLAN:    Patient Update Assessment (new medications, allergies, medical history): No updates reported    Medication(s): Temozolomide    Currently Taking Medication(s): As directed    Prior Authorization Status: Approved    Financial Assistance Status: None needed - $0 co-pay    Any Issues Identified: None    Appropriate to Process Prescription(s): Refill due    Counseling Offered: Declined    Next Specialty Pharmacy Visit: ~30 days

## 2021-06-25 NOTE — PROGRESS NOTES
Patient: Kennedy Reardon  : 1965  Gender: male    Primary Care Provider: Feliberto Moore MD    Chief Complaint: Follow-up    History of Present Illness:  Kennedy Reardon is a 56-year-old gentleman who is known to Dr. Harrell service.  On 2020 he underwent left temporal craniotomy for resection of a glioma.  Postoperatively he has done well.  He has been followed closely with subsequent MRIs in [place].  He remains on Temodar.  Presently he states that he is feeling well.  He has no new complaints.  He presents today with a new MRI for surveillance.      Past Medical and Surgical History:  Past Medical History:   Diagnosis Date   • Brain cancer (CMS/HCC)    • History of radiation therapy 2020    left temporal lobe of brain   • Hyperlipidemia    • Hypertension    • Medical history reviewed with no changes    • Seizures (CMS/HCC)      Past Surgical History:   Procedure Laterality Date   • CRANIOTOMY FOR TUMOR Left 2020    Procedure: CRANIOTOMY FOR TUMOR LEFT;  Surgeon: Gilbert Harrell MD;  Location: [facility];  Service: Neurosurgery;  Laterality: Left;       Current Medications:    Current Outpatient Medications:   •  aspirin 81 MG chewable tablet, Chew 81 mg Daily., Disp: , Rfl:   •  atenolol (TENORMIN) 50 MG tablet, Take 50 mg by mouth Daily., Disp: , Rfl:   •  cetirizine (zyrTEC) 10 MG tablet, Take 10 mg by mouth Daily., Disp: , Rfl:   •  docusate sodium 100 MG capsule, Take 1capsule by mouth 2 (Two) Times a Day As Needed for Constipation., Disp: 30 each, Rfl: 2  •  levETIRAcetam (KEPPRA) 500 MG tablet, Take 1 tablet by mouth Every 12 (Twelve) Hours., Disp: 180 tablet, Rfl: 4  •  losartan (COZAAR) 50 MG tablet, Take 50 mg by mouth Daily., Disp: , Rfl:   •  lovastatin (MEVACOR) 20 MG tablet, Take 20 mg by mouth 2 (two) times a day., Disp: , Rfl:   •  ondansetron ODT (ZOFRAN-ODT) 4 MG disintegrating tablet, Place 1 tablet on the tongue Every 6 (Six) Hours As Needed for Nausea or Vomiting for  up to 12 doses., Disp: 12 tablet, Rfl: 0  •  prochlorperazine (COMPAZINE) 10 MG tablet, Take 1 tablet by mouth Every 8 (Eight) Hours As Needed for Nausea or Vomiting., Disp: 90 tablet, Rfl: 5  •  sulfamethoxazole-trimethoprim (BACTRIM DS,SEPTRA DS) 800-160 MG per tablet, Take 1 tablet by mouth 3 (Three) Times a Week. Take 1 tablet on Mondays, Wednesdays and Fridays., Disp: 12 tablet, Rfl: 7  •  temozolomide (TEMODAR) 140 MG chemo capsule, Take 1 capsule by mouth with 1 other temozolomide prescription for 320 mg total Daily for 5 days. Take 140 mg + 180 mg capsule on days 1-5 of each cycle., Disp: 5 capsule, Rfl: 5  •  temozolomide (TEMODAR) 180 MG chemo capsule, Take 1 capsule by mouth with 1 other temozolomide prescription for 320 mg total Daily for 5 days. Take 140 mg + 180 mg capsule on days 1-5 of each cycle., Disp: 5 capsule, Rfl: 5    Allergies:  No Known Allergies      Review of Systems   Constitutional: Negative for activity change, appetite change, chills, diaphoresis, fatigue, fever, unexpected weight gain and unexpected weight loss.   HENT: Negative for congestion, dental problem, drooling, ear discharge, ear pain, facial swelling, hearing loss, mouth sores, nosebleeds, postnasal drip, rhinorrhea, sinus pressure, sneezing, sore throat, swollen glands, tinnitus, trouble swallowing and voice change.    Eyes: Negative for blurred vision, double vision, photophobia, pain, discharge, redness, itching and visual disturbance.   Respiratory: Negative for apnea, cough, choking, chest tightness, shortness of breath, wheezing and stridor.    Cardiovascular: Negative for chest pain, palpitations and leg swelling.   Gastrointestinal: Negative for abdominal distention, abdominal pain, anal bleeding, blood in stool, constipation, diarrhea, nausea, rectal pain, vomiting, GERD and indigestion.   Endocrine: Negative for cold intolerance, heat intolerance, polydipsia, polyphagia and polyuria.   Genitourinary: Negative for  breast discharge, decreased libido, decreased urine volume, difficulty urinating, discharge, dysuria, flank pain, frequency, genital sores, hematuria, nocturia, penile pain, erectile dysfunction, penile swelling, scrotal swelling, testicular pain, urgency and urinary incontinence.   Musculoskeletal: Negative for arthralgias, back pain, gait problem, joint swelling, myalgias, neck pain, neck stiffness and bursitis.   Skin: Negative for color change, dry skin, pallor, rash, skin lesions and bruise.   Allergic/Immunologic: Negative for environmental allergies, food allergies and immunocompromised state.   Neurological: Negative for dizziness, tremors, seizures, syncope, facial asymmetry, speech difficulty, weakness, light-headedness, numbness, headache, memory problem and confusion.   Hematological: Negative for adenopathy. Does not bruise/bleed easily.   Psychiatric/Behavioral: Negative for agitation, behavioral problems, decreased concentration, dysphoric mood, hallucinations, self-injury, sleep disturbance, suicidal ideas, negative for hyperactivity, depressed mood and stress. The patient is not nervous/anxious.    All other systems reviewed and are negative.        Physical Exam  Constitutional:       Appearance: Normal appearance.   Musculoskeletal:         General: Normal range of motion.      Cervical back: Normal range of motion and neck supple.   Skin:     General: Skin is warm and dry.   Neurological:      Mental Status: He is alert and oriented to person, place, and time.      Sensory: Sensation is intact.      Motor: Motor function is intact.      Coordination: Coordination is intact.      Gait: Gait is intact.      Comments: CRANIAL NERVES:  Cranial nerve II:  Visual fields are full to confrontation.  Cranial nerves III, IV and VI:  PERRLADC.  Extraocular movements are intact.  Nystagmus is not present.  Cranial nerve V:  Facial sensation is intact to light touch.  Cranial nerve VII:  Muscles of facial  "expression reveal no asymmetry.  Cranial nerve VIII:  Hearing is intact to finger rub bilaterally.  Cranial nerves IX and X:  Palate elevates symmetrically.  Cranial nerve XI:  Shoulder shrug is intact.  Cranial nerve XII:  Tongue is midline without evidence of atrophy or fasciculation.   Psychiatric:         Mood and Affect: Mood normal.         Behavior: Behavior normal.           Vitals:    06/25/21 1421   BP: 129/79   BP Location: Right arm   Patient Position: Sitting   Cuff Size: Adult   Temp: 98.8 °F (37.1 °C)   TempSrc: Infrared   Weight: 99.5 kg (219 lb 6.4 oz)   Height: 172.7 cm (68\")       Patient's Body mass index is 33.36 kg/m². indicating that he is obese (BMI >30).     Independent Review of Diagnostic Imaging:  MRI brain with without contrast performed 6/25/2021 demonstrates stable postsurgical changes within the left temporal lobe without significant increase size of the lesion.  The surrounding vasogenic edema is the same.  There are no new enhancing lesions noted.    Assessment:  1.  Left temporal glioma status post resection    Plan:  Mr. Reardon is seen today in follow-up for continued surveillance.  He continues to do well clinically.  MRI brain reviewed with Dr. Ag which appears stable.  He will continue treatment with oncology.  We will continue Temodar.  We will see him back in 3 months for continued surveillance.        Nuria Cavanaugh PA-C  "

## 2021-06-28 ENCOUNTER — SPECIALTY PHARMACY (OUTPATIENT)
Dept: PHARMACY | Facility: HOSPITAL | Age: 56
End: 2021-06-28

## 2021-07-01 ENCOUNTER — SPECIALTY PHARMACY (OUTPATIENT)
Dept: PHARMACY | Facility: HOSPITAL | Age: 56
End: 2021-07-01

## 2021-07-07 ENCOUNTER — OFFICE VISIT (OUTPATIENT)
Dept: ONCOLOGY | Facility: CLINIC | Age: 56
End: 2021-07-07

## 2021-07-07 ENCOUNTER — LAB (OUTPATIENT)
Dept: ONCOLOGY | Facility: CLINIC | Age: 56
End: 2021-07-07

## 2021-07-07 VITALS
BODY MASS INDEX: 34.06 KG/M2 | OXYGEN SATURATION: 96 % | DIASTOLIC BLOOD PRESSURE: 90 MMHG | HEART RATE: 80 BPM | WEIGHT: 224 LBS | TEMPERATURE: 98.2 F | RESPIRATION RATE: 18 BRPM | SYSTOLIC BLOOD PRESSURE: 150 MMHG

## 2021-07-07 DIAGNOSIS — G93.89 BRAIN MASS: Primary | ICD-10-CM

## 2021-07-07 DIAGNOSIS — C71.9 GLIOMA (HCC): ICD-10-CM

## 2021-07-07 LAB
ALBUMIN SERPL-MCNC: 4.12 G/DL (ref 3.5–5.2)
ALBUMIN/GLOB SERPL: 1.4 G/DL
ALP SERPL-CCNC: 100 U/L (ref 39–117)
ALT SERPL W P-5'-P-CCNC: 32 U/L (ref 1–41)
ANION GAP SERPL CALCULATED.3IONS-SCNC: 8.8 MMOL/L (ref 5–15)
AST SERPL-CCNC: 20 U/L (ref 1–40)
BASOPHILS # BLD AUTO: 0.05 10*3/MM3 (ref 0–0.2)
BASOPHILS NFR BLD AUTO: 0.8 % (ref 0–1.5)
BILIRUB SERPL-MCNC: 0.3 MG/DL (ref 0–1.2)
BUN SERPL-MCNC: 6 MG/DL (ref 6–20)
BUN/CREAT SERPL: 7.2 (ref 7–25)
CALCIUM SPEC-SCNC: 9.4 MG/DL (ref 8.6–10.5)
CHLORIDE SERPL-SCNC: 101 MMOL/L (ref 98–107)
CO2 SERPL-SCNC: 27.2 MMOL/L (ref 22–29)
CREAT SERPL-MCNC: 0.83 MG/DL (ref 0.76–1.27)
DEPRECATED RDW RBC AUTO: 40.2 FL (ref 37–54)
EOSINOPHIL # BLD AUTO: 0.13 10*3/MM3 (ref 0–0.4)
EOSINOPHIL NFR BLD AUTO: 2 % (ref 0.3–6.2)
ERYTHROCYTE [DISTWIDTH] IN BLOOD BY AUTOMATED COUNT: 12 % (ref 12.3–15.4)
GFR SERPL CREATININE-BSD FRML MDRD: 96 ML/MIN/1.73
GLOBULIN UR ELPH-MCNC: 3 GM/DL
GLUCOSE SERPL-MCNC: 117 MG/DL (ref 65–99)
HCT VFR BLD AUTO: 42.6 % (ref 37.5–51)
HGB BLD-MCNC: 14.3 G/DL (ref 13–17.7)
IMM GRANULOCYTES # BLD AUTO: 0.02 10*3/MM3 (ref 0–0.05)
IMM GRANULOCYTES NFR BLD AUTO: 0.3 % (ref 0–0.5)
LYMPHOCYTES # BLD AUTO: 1.82 10*3/MM3 (ref 0.7–3.1)
LYMPHOCYTES NFR BLD AUTO: 28.1 % (ref 19.6–45.3)
MCH RBC QN AUTO: 30.6 PG (ref 26.6–33)
MCHC RBC AUTO-ENTMCNC: 33.6 G/DL (ref 31.5–35.7)
MCV RBC AUTO: 91 FL (ref 79–97)
MONOCYTES # BLD AUTO: 0.51 10*3/MM3 (ref 0.1–0.9)
MONOCYTES NFR BLD AUTO: 7.9 % (ref 5–12)
NEUTROPHILS NFR BLD AUTO: 3.95 10*3/MM3 (ref 1.7–7)
NEUTROPHILS NFR BLD AUTO: 60.9 % (ref 42.7–76)
NRBC BLD AUTO-RTO: 0 /100 WBC (ref 0–0.2)
PLATELET # BLD AUTO: 204 10*3/MM3 (ref 140–450)
PMV BLD AUTO: 9.7 FL (ref 6–12)
POTASSIUM SERPL-SCNC: 5.1 MMOL/L (ref 3.5–5.2)
PROT SERPL-MCNC: 7.1 G/DL (ref 6–8.5)
RBC # BLD AUTO: 4.68 10*6/MM3 (ref 4.14–5.8)
SODIUM SERPL-SCNC: 137 MMOL/L (ref 136–145)
WBC # BLD AUTO: 6.48 10*3/MM3 (ref 3.4–10.8)

## 2021-07-07 PROCEDURE — 99214 OFFICE O/P EST MOD 30 MIN: CPT | Performed by: INTERNAL MEDICINE

## 2021-07-07 PROCEDURE — 85025 COMPLETE CBC W/AUTO DIFF WBC: CPT | Performed by: INTERNAL MEDICINE

## 2021-07-07 PROCEDURE — 80053 COMPREHEN METABOLIC PANEL: CPT | Performed by: INTERNAL MEDICINE

## 2021-07-07 NOTE — PROGRESS NOTES
Name:  Kennedy Reardon  :  1965  Date:  2021     REFERRING PHYSICIAN  Gilbert Harrell MD    PRIMARY CARE PHYSICIAN  Feliberto Moore MD    REASON FOR FOLLOWUP  1. 5 X 3.8cm L temporal glioma s/p crani and excision 2020      CHIEF COMPLAINT  None.    Dear Dr. Harrell,    HISTORY OF PRESENT ILLNESS:   I saw Mr. Reardon in follow up today in our medical oncology clinic. As you are aware, he is a pleasant, 56 y.o., white male with a history of minimal medical problems who was in his usual state of health until late 2020 when he suffered a couple of seizures a few days apart. He presented to our ED following the second episode and was med-flighted to [facility] due to concerns that he was suffering a stroke. Ultimately, an MRI of the brain identified a cystic mass in the left temporoparietal region; and you took him to the OR on 2020. The final, surgical pathology was consistent with glioblastoma multiforme. He recovered uneventfully from his craniotomy, and he was subsequently referred to our clinic for assistance with his management closer to his home in [place], [place].     INTERIM HISTORY:  Mr. Reardon presents today for follow up accompanied by his wife. He completed concomitant, daily PO Temodar and XRT on 2020 and tolerated this regimen well, with mild nausea as his only reported noticeable side effects. He was subsequently agreeable to transitioning to qmonthly, PO Temodar alone and began this regimen on 2021 (taking the Temodar from -15). He has now completed a total of six (6) cycles and continues to tolerate this treatment well, with mild, but very tolerable, nausea for the first couple of days of each cycle (he has still not even had to take any antiemetics). He did have to go to our ED in early , but he quickly felt better after he was given IVFs and other supportive treatment for dehydration. He again denies any specific complaints.    Past  Medical History:   Diagnosis Date   • Brain cancer (CMS/HCC)    • History of radiation therapy 12/02/2020    left temporal lobe of brain   • Hyperlipidemia    • Hypertension    • Medical history reviewed with no changes    • Seizures (CMS/HCC)        Past Surgical History:   Procedure Laterality Date   • CRANIOTOMY FOR TUMOR Left 8/28/2020    Procedure: CRANIOTOMY FOR TUMOR LEFT;  Surgeon: Gilbert Harrell MD;  Location: [facility];  Service: Neurosurgery;  Laterality: Left;       Social History     Socioeconomic History   • Marital status:      Spouse name: Not on file   • Number of children: Not on file   • Years of education: Not on file   • Highest education level: Not on file   Tobacco Use   • Smoking status: Current Every Day Smoker     Packs/day: 0.50   • Smokeless tobacco: Never Used   Vaping Use   • Vaping Use: Never used   Substance and Sexual Activity   • Alcohol use: No     Comment: few beers every now and then   • Drug use: Defer   • Sexual activity: Defer       Family History   Problem Relation Age of Onset   • Osteoarthritis Mother    • Rheum arthritis Mother    • Hypertension Mother    • Cancer Father    • Osteoporosis Sister    • Osteoarthritis Maternal Grandfather    • Rheum arthritis Maternal Grandfather    • Heart disease Paternal Grandmother    • Cancer Paternal Grandfather    • Heart disease Paternal Grandfather    • Diabetes Paternal Grandfather        No Known Allergies     .ECOG score: 0               Current Outpatient Medications   Medication Sig Dispense Refill   • aspirin 81 MG chewable tablet Chew 81 mg Daily.     • atenolol (TENORMIN) 50 MG tablet Take 50 mg by mouth Daily.     • cetirizine (zyrTEC) 10 MG tablet Take 10 mg by mouth Daily.     • docusate sodium 100 MG capsule Take 1capsule by mouth 2 (Two) Times a Day As Needed for Constipation. 30 each 2   • levETIRAcetam (KEPPRA) 500 MG tablet Take 1 tablet by mouth Every 12 (Twelve) Hours. 180 tablet 4   • losartan (COZAAR)  50 MG tablet Take 50 mg by mouth Daily.     • lovastatin (MEVACOR) 20 MG tablet Take 20 mg by mouth 2 (two) times a day.     • ondansetron ODT (ZOFRAN-ODT) 4 MG disintegrating tablet Place 1 tablet on the tongue Every 6 (Six) Hours As Needed for Nausea or Vomiting for up to 12 doses. 12 tablet 0   • prochlorperazine (COMPAZINE) 10 MG tablet Take 1 tablet by mouth Every 8 (Eight) Hours As Needed for Nausea or Vomiting. 90 tablet 5   • sulfamethoxazole-trimethoprim (BACTRIM DS,SEPTRA DS) 800-160 MG per tablet Take 1 tablet by mouth 3 (Three) Times a Week. Take 1 tablet on Mondays, Wednesdays and Fridays. 12 tablet 7     No current facility-administered medications for this visit.     REVIEW OF SYSTEMS  CONSTITUTIONAL:  No fever, chills or night sweats.  EYES:  No blurry vision, diplopia or other vision changes. Improved photosensitivity, no longer wearing sunglasses indoors (as he was doing at the time of his initial consultation).  ENT:  No hearing loss, nosebleeds or sore throat.  CARDIOVASCULAR:  No palpitations, arrhythmia, syncopal episodes or edema.  PULMONARY:  No hemoptysis, wheezing, chronic cough or shortness of breath.  GASTROINTESTINAL:  No constipation or diarrhea. No abdominal pain. Mild, nausea for the first couple of days after starting qmonthly, PO Temodar, as per the HPI above.  GENITOURINARY:  No hematuria, kidney stones or frequent urination.  MUSCULOSKELETAL:  No joint or back pains.  INTEGUMENTARY: No rashes or pruritus.  ENDOCRINE:  No excessive thirst or hot flashes.  HEMATOLOGIC:  No history of free bleeding, spontaneous bleeding or clotting.  IMMUNOLOGIC:  No allergies or frequent infections.  NEUROLOGIC: As per the HPI above.  PSYCHIATRIC:  No anxiety or depression.    PHYSICAL EXAMINATION  /90   Pulse 80   Temp 98.2 °F (36.8 °C) (Temporal)   Resp 18   Wt 102 kg (224 lb)   SpO2 96%   BMI 34.06 kg/m²     Pain Score:  Pain Score    07/07/21 1218   PainSc: 0-No pain     PHQ-Score  Total:  PHQ-9 Total Score:      ECO  GENERAL:  A well-developed, well-nourished, white male in no acute distress.  HEENT:  Pupils equally round reactive to light. Extraocular muscles intact. Resolved alopecia.  CARDIOVASCULAR:  Regular rate and rhythm. No murmurs, gallops or rubs.  LUNGS:  Clear to auscultation bilaterally.  ABDOMEN:  Soft, nontender, nondistended with positive bowel sounds.  EXTREMITIES:  No clubbing, cyanosis or edema bilaterally.  SKIN:  No rashes or petechiae.  NEURO:  No focal deficits.  PSYCH:  Alert and oriented x3.    The physical exam is unchanged from recent priors.    LABORATORY  Lab Results   Component Value Date    WBC 6.48 2021    HGB 14.3 2021    HCT 42.6 2021    MCV 91.0 2021     2021    NEUTROABS 3.95 2021       Lab Results   Component Value Date     2021    K 5.1 2021     2021    CO2 27.2 2021    BUN 6 2021    CREATININE 0.83 2021    GLUCOSE 117 (H) 2021    CALCIUM 9.4 2021    AST 20 2021    ALT 32 2021    ALKPHOS 100 2021    BILITOT 0.3 2021    PROTEINTOT 7.1 2021    ALBUMIN 4.12 2021     CBC (2021): WBCs: 6.48; HgB: 14.3; Hct: 42.6; platelets: 204  CBC (2021): WBCs: 7.1; HgB: 15.5; Hct: 46.4; platelets: 208  CBC (2021): WBCs: 7.4; HgB: 14.9; Hct: 44.1; platelets: 240  CBC (2021): WBCs: 7.4; HgB: 15.1; Hct: 45.5; platelets: 244  CBC (2021): WBCs: 7.0; HgB: 15.3; Hct: 45.3; platelets: 244  CBC (2021): WBCs: 7.4; HgB: 15.0; Hct: 45.5; platelets: 182  CBC (2020): WBCs: 7.8; HgB: 15.0; Hct: 45.0; platelets: 258    IMAGING  MRI brain with contrast (2020):  Impression: Large cystic mass identified in the left temporoparietal region with surgical markers in place for surgical planning. The largest axial dimension of the nodular component is 2.5 x 1.6 cm.    MRI brain with and without contrast  (08/31/2020):  Impression: Postsurgical changes left temporal craniotomy and resection with expected early postoperative changes including dural enhancement mildly prominent along the left inferolateral margin of the resection site with attention on followup otherwise, no residual soft tissue nodular enhancement or mass-occupying focus of enhancement. No midline shift or hydrocephalus.    MRI brain with and without contrast (10/01/2020):  Impression: T2 signal abnormality surrounding a left temporal mass measuring approximately 1.8 x 2.9 cm that shows predominantly peripheral enhancement. Some postsurgical changes noted overlying this region, but no prior examination is available for comparison.    MRI brain without contrast (12/03/2020):  Impression: The left temporal lobe lesion is slightly smaller now with several cystic areas in the lesion. There is less edema than on the previous exam in 10/2020.    MRI brain with and without contrast (02/04/2021):  Impression: [Remains stable per neurosurgery interpretation.]    MRI brain with and without contrast (04/01/2021):  Impression:  1) Based on retrospective remeasurement, no interval change in size of enhancing mass left temporal lobe with cystic components and no change in the degree of peritumoral signal abnormality/vasogenic edema. Today's measurements are 3.0 x 2.4 cm.  2) Localized mass effect but no hydrocephalus or midline shift.  3) No new enhancing brain lesions identified.    MRI brain with and without contrast (04/29/2021):  Impression: Again there is a peripherally enhancing mass in the right temporal lobe again measuring about 2.1 x 2.6 cm. Again there is surrounding vasogenic edema.    MRI brain with and without contrast (06/25/2021):  Impression:  1) Contrast-enhancing mass in the left temporal lobe very similar to the previous exam. Surrounding vasogenic edema again noted.  2) No new contrast-enhancing lesions.    PATHOLOGY  Temporal lobe mass  (08/28/2020):  Glioblastoma multiforme. IDH1/IDH2 mutation not detected. 1p/19q co-deletion not detected. MGMT gene promotor methylation: Detected.    IMPRESSION AND PLAN  Mr. Reardon is a 56 y.o., white male with:  Glioblastoma multiforme: Initially diagnosed in late August 2020 following a couple of seizure events a few days apart and status post craniotomy with resection of a left, temporoparietal region, cystic mass on 08/27/2020. The final surgical pathology results are summarized above, and he recovered well from this procedure. I have had multiple, long discussions with the patient (+/- his wife) since the time of his initial consultation in our clinic (on 09/30/2020) regarding this diagnosis and its prognosis, reiterating much of what they were previously told by [facility] neurosurgery. They remain aware that his diagnosis is, unfortunately, ultimately very poor; however, this malignancy was, and remains, potentially treatable and sustained remissions are possible. Adjuvant, concomitant chemotherapy and radiation was recommended. He began a ~six-week course of this regimen (with temozolomide 75 mg/m2 daily with XRT; patient dose: 160 mg) by mid-October 2020 and completed it by early December 2020, overall tolerating this regimen well, with some mild, manageable nausea as his only noticeable side effect. He had a repeat MRI with [facility] neurosurgery on 12/03/2020, following the conclusion of his chemo/XRT, which showed overall improvement (summarized above). Subsequently, he was felt to be a good candidate to transition to adjuvant, qmonthly PO Temodar alone (150 mg/m2 on days 1-5 of 28-day cycles; patient dose: 320 mg). He began this qmonthly therapy in mid-January 2021. He has now completed a total of six (6) cycles (most recently taking the Temodar from 06/07-11/2021; and he again reports today that he continues to tolerate this regimen overall well (with some mild, manageable nausea for  the first couple of days as his only noticeable side effect; he has still not had to take any antiemetics). Meanwhile, the most recent repeat MRI of the brain (performed on 06/25/2021 and summarized above) remained overall unchanged. We will proceed with this current, adjuvant treatment plan (day #1 of cycle #7 scheduled shortly). We will continue to check CBCs and CMPs on a qmonthly basis, just prior to the start of each cycle of Temodar. He will follow up with neurosurgery again in ~late September with another repeat MRI of the brain, as previously planned. We will see him back in our clinic in three months, shortly after these (the MRI and NS) appointments and just prior to the start of the potential tenth cycle of qmonthly Temodar, with a CBC and CMP. The patient and his wife were in agreement with these plans.    It is a pleasure to participate in Mr. Reardon's care. Please do not hesitate to call with any questions or concerns that you may have.    A total of 30 minutes were spent coordinating this patient’s care in clinic today; more than 50% of this time was face-to-face with the patient and his wife, reviewing his interim medical history, discussing the results of recent labwork and repeat MRI of the brain and counseling on the current treatment and followup plan. All questions were answered to their satisfaction.    FOLLOW UP  Proceed with adjuvant, qmonthly, PO Temodar alone, as planned (cycle #7 scheduled to start later this week). With neurosurgery with a repeat MRI of the brain in late September, as previously planned. Return to our clinic in 3 months, shortly after the next MRI and NS appointments and just prior to the start of the potential 10th cycle of qmonthly Temodar, respectively, with a CBC and CMP. Qmonthly CBCs and CMPs between now and then (just prior to the start of the 8th and 9th cycles of Temodar).            This document was electronically signed by KEON Casey MD July 7, 2021  12:52 EDT      CC: MD Zena Mason MD James W. Killian, MD

## 2021-07-28 ENCOUNTER — SPECIALTY PHARMACY (OUTPATIENT)
Dept: PHARMACY | Facility: HOSPITAL | Age: 56
End: 2021-07-28
Payer: COMMERCIAL

## 2021-07-28 DIAGNOSIS — G93.89 BRAIN MASS: ICD-10-CM

## 2021-07-28 RX ORDER — TEMOZOLOMIDE 180 MG/1
180 CAPSULE ORAL DAILY
Qty: 5 CAPSULE | Refills: 3 | Status: SHIPPED | OUTPATIENT
Start: 2021-07-28 | End: 2021-11-29 | Stop reason: SDUPTHER

## 2021-07-28 RX ORDER — TEMOZOLOMIDE 140 MG/1
140 CAPSULE ORAL DAILY
Qty: 5 CAPSULE | Refills: 3 | Status: SHIPPED | OUTPATIENT
Start: 2021-07-28 | End: 2021-11-29 | Stop reason: SDUPTHER

## 2021-08-06 ENCOUNTER — LAB (OUTPATIENT)
Dept: ONCOLOGY | Facility: CLINIC | Age: 56
End: 2021-08-06

## 2021-08-06 VITALS
HEIGHT: 68 IN | HEART RATE: 79 BPM | SYSTOLIC BLOOD PRESSURE: 139 MMHG | TEMPERATURE: 97.8 F | DIASTOLIC BLOOD PRESSURE: 85 MMHG | OXYGEN SATURATION: 98 % | RESPIRATION RATE: 18 BRPM | BODY MASS INDEX: 34.74 KG/M2 | WEIGHT: 229.2 LBS

## 2021-08-06 DIAGNOSIS — C71.9 GLIOMA (HCC): ICD-10-CM

## 2021-08-06 LAB
ALBUMIN SERPL-MCNC: 4.02 G/DL (ref 3.5–5.2)
ALBUMIN/GLOB SERPL: 1.4 G/DL
ALP SERPL-CCNC: 92 U/L (ref 39–117)
ALT SERPL W P-5'-P-CCNC: 22 U/L (ref 1–41)
ANION GAP SERPL CALCULATED.3IONS-SCNC: 7.9 MMOL/L (ref 5–15)
AST SERPL-CCNC: 17 U/L (ref 1–40)
BASOPHILS # BLD AUTO: 0.06 10*3/MM3 (ref 0–0.2)
BASOPHILS NFR BLD AUTO: 0.9 % (ref 0–1.5)
BILIRUB SERPL-MCNC: 0.3 MG/DL (ref 0–1.2)
BUN SERPL-MCNC: 5 MG/DL (ref 6–20)
BUN/CREAT SERPL: 6.4 (ref 7–25)
CALCIUM SPEC-SCNC: 9.1 MG/DL (ref 8.6–10.5)
CHLORIDE SERPL-SCNC: 102 MMOL/L (ref 98–107)
CO2 SERPL-SCNC: 26.1 MMOL/L (ref 22–29)
CREAT SERPL-MCNC: 0.78 MG/DL (ref 0.76–1.27)
DEPRECATED RDW RBC AUTO: 43.6 FL (ref 37–54)
EOSINOPHIL # BLD AUTO: 0.2 10*3/MM3 (ref 0–0.4)
EOSINOPHIL NFR BLD AUTO: 3 % (ref 0.3–6.2)
ERYTHROCYTE [DISTWIDTH] IN BLOOD BY AUTOMATED COUNT: 12.8 % (ref 12.3–15.4)
GFR SERPL CREATININE-BSD FRML MDRD: 103 ML/MIN/1.73
GLOBULIN UR ELPH-MCNC: 2.9 GM/DL
GLUCOSE SERPL-MCNC: 110 MG/DL (ref 65–99)
HCT VFR BLD AUTO: 42.5 % (ref 37.5–51)
HGB BLD-MCNC: 14 G/DL (ref 13–17.7)
IMM GRANULOCYTES # BLD AUTO: 0.03 10*3/MM3 (ref 0–0.05)
IMM GRANULOCYTES NFR BLD AUTO: 0.4 % (ref 0–0.5)
LYMPHOCYTES # BLD AUTO: 1.92 10*3/MM3 (ref 0.7–3.1)
LYMPHOCYTES NFR BLD AUTO: 28.6 % (ref 19.6–45.3)
MCH RBC QN AUTO: 30.5 PG (ref 26.6–33)
MCHC RBC AUTO-ENTMCNC: 32.9 G/DL (ref 31.5–35.7)
MCV RBC AUTO: 92.6 FL (ref 79–97)
MONOCYTES # BLD AUTO: 0.58 10*3/MM3 (ref 0.1–0.9)
MONOCYTES NFR BLD AUTO: 8.6 % (ref 5–12)
NEUTROPHILS NFR BLD AUTO: 3.93 10*3/MM3 (ref 1.7–7)
NEUTROPHILS NFR BLD AUTO: 58.5 % (ref 42.7–76)
NRBC BLD AUTO-RTO: 0 /100 WBC (ref 0–0.2)
PLATELET # BLD AUTO: 216 10*3/MM3 (ref 140–450)
PMV BLD AUTO: 9.6 FL (ref 6–12)
POTASSIUM SERPL-SCNC: 4.5 MMOL/L (ref 3.5–5.2)
PROT SERPL-MCNC: 6.9 G/DL (ref 6–8.5)
RBC # BLD AUTO: 4.59 10*6/MM3 (ref 4.14–5.8)
SODIUM SERPL-SCNC: 136 MMOL/L (ref 136–145)
WBC # BLD AUTO: 6.72 10*3/MM3 (ref 3.4–10.8)

## 2021-08-06 PROCEDURE — 80053 COMPREHEN METABOLIC PANEL: CPT | Performed by: INTERNAL MEDICINE

## 2021-08-06 PROCEDURE — 85025 COMPLETE CBC W/AUTO DIFF WBC: CPT | Performed by: INTERNAL MEDICINE

## 2021-09-07 ENCOUNTER — LAB (OUTPATIENT)
Dept: ONCOLOGY | Facility: CLINIC | Age: 56
End: 2021-09-07

## 2021-09-07 VITALS
OXYGEN SATURATION: 97 % | RESPIRATION RATE: 18 BRPM | SYSTOLIC BLOOD PRESSURE: 144 MMHG | TEMPERATURE: 98.6 F | DIASTOLIC BLOOD PRESSURE: 90 MMHG | HEART RATE: 81 BPM

## 2021-09-07 DIAGNOSIS — G93.89 BRAIN MASS: ICD-10-CM

## 2021-09-07 LAB
ALBUMIN SERPL-MCNC: 4.26 G/DL (ref 3.5–5.2)
ALBUMIN/GLOB SERPL: 1.4 G/DL
ALP SERPL-CCNC: 98 U/L (ref 39–117)
ALT SERPL W P-5'-P-CCNC: 26 U/L (ref 1–41)
ANION GAP SERPL CALCULATED.3IONS-SCNC: 9.9 MMOL/L (ref 5–15)
AST SERPL-CCNC: 20 U/L (ref 1–40)
BASOPHILS # BLD AUTO: 0.05 10*3/MM3 (ref 0–0.2)
BASOPHILS NFR BLD AUTO: 0.7 % (ref 0–1.5)
BILIRUB SERPL-MCNC: 0.4 MG/DL (ref 0–1.2)
BUN SERPL-MCNC: 6 MG/DL (ref 6–20)
BUN/CREAT SERPL: 6.5 (ref 7–25)
CALCIUM SPEC-SCNC: 9.3 MG/DL (ref 8.6–10.5)
CHLORIDE SERPL-SCNC: 100 MMOL/L (ref 98–107)
CO2 SERPL-SCNC: 26.1 MMOL/L (ref 22–29)
CREAT SERPL-MCNC: 0.92 MG/DL (ref 0.76–1.27)
DEPRECATED RDW RBC AUTO: 42.5 FL (ref 37–54)
EOSINOPHIL # BLD AUTO: 0.26 10*3/MM3 (ref 0–0.4)
EOSINOPHIL NFR BLD AUTO: 3.4 % (ref 0.3–6.2)
ERYTHROCYTE [DISTWIDTH] IN BLOOD BY AUTOMATED COUNT: 12.3 % (ref 12.3–15.4)
GFR SERPL CREATININE-BSD FRML MDRD: 85 ML/MIN/1.73
GLOBULIN UR ELPH-MCNC: 2.9 GM/DL
GLUCOSE SERPL-MCNC: 114 MG/DL (ref 65–99)
HCT VFR BLD AUTO: 44.1 % (ref 37.5–51)
HGB BLD-MCNC: 14.6 G/DL (ref 13–17.7)
IMM GRANULOCYTES # BLD AUTO: 0.02 10*3/MM3 (ref 0–0.05)
IMM GRANULOCYTES NFR BLD AUTO: 0.3 % (ref 0–0.5)
LYMPHOCYTES # BLD AUTO: 1.99 10*3/MM3 (ref 0.7–3.1)
LYMPHOCYTES NFR BLD AUTO: 26.4 % (ref 19.6–45.3)
MCH RBC QN AUTO: 30.7 PG (ref 26.6–33)
MCHC RBC AUTO-ENTMCNC: 33.1 G/DL (ref 31.5–35.7)
MCV RBC AUTO: 92.8 FL (ref 79–97)
MONOCYTES # BLD AUTO: 0.64 10*3/MM3 (ref 0.1–0.9)
MONOCYTES NFR BLD AUTO: 8.5 % (ref 5–12)
NEUTROPHILS NFR BLD AUTO: 4.58 10*3/MM3 (ref 1.7–7)
NEUTROPHILS NFR BLD AUTO: 60.7 % (ref 42.7–76)
NRBC BLD AUTO-RTO: 0 /100 WBC (ref 0–0.2)
PLATELET # BLD AUTO: 207 10*3/MM3 (ref 140–450)
PMV BLD AUTO: 9.5 FL (ref 6–12)
POTASSIUM SERPL-SCNC: 4.7 MMOL/L (ref 3.5–5.2)
PROT SERPL-MCNC: 7.2 G/DL (ref 6–8.5)
RBC # BLD AUTO: 4.75 10*6/MM3 (ref 4.14–5.8)
SODIUM SERPL-SCNC: 136 MMOL/L (ref 136–145)
WBC # BLD AUTO: 7.54 10*3/MM3 (ref 3.4–10.8)

## 2021-09-07 PROCEDURE — 80053 COMPREHEN METABOLIC PANEL: CPT | Performed by: INTERNAL MEDICINE

## 2021-09-07 PROCEDURE — 85025 COMPLETE CBC W/AUTO DIFF WBC: CPT | Performed by: INTERNAL MEDICINE

## 2021-09-16 ENCOUNTER — APPOINTMENT (OUTPATIENT)
Dept: MRI IMAGING | Facility: HOSPITAL | Age: 56
End: 2021-09-16

## 2021-09-16 ENCOUNTER — HOSPITAL ENCOUNTER (OUTPATIENT)
Dept: MRI IMAGING | Facility: HOSPITAL | Age: 56
Discharge: HOME | End: 2021-09-16
Admitting: PHYSICIAN ASSISTANT

## 2021-09-16 ENCOUNTER — OFFICE VISIT (OUTPATIENT)
Dept: NEUROSURGERY | Facility: CLINIC | Age: 56
End: 2021-09-16

## 2021-09-16 VITALS
SYSTOLIC BLOOD PRESSURE: 136 MMHG | DIASTOLIC BLOOD PRESSURE: 78 MMHG | HEART RATE: 78 BPM | OXYGEN SATURATION: 93 % | WEIGHT: 229.2 LBS | HEIGHT: 65 IN | RESPIRATION RATE: 20 BRPM | BODY MASS INDEX: 38.19 KG/M2

## 2021-09-16 DIAGNOSIS — G93.89 BRAIN MASS: ICD-10-CM

## 2021-09-16 DIAGNOSIS — C71.9 GLIOMA (HCC): Primary | ICD-10-CM

## 2021-09-16 PROBLEM — Z92.3 HISTORY OF RADIATION THERAPY: Status: ACTIVE | Noted: 2020-12-02

## 2021-09-16 PROCEDURE — 70553 MRI BRAIN STEM W/O & W/DYE: CPT | Performed by: RADIOLOGY

## 2021-09-16 PROCEDURE — 70553 MRI BRAIN STEM W/O & W/DYE: CPT

## 2021-09-16 PROCEDURE — 99213 OFFICE O/P EST LOW 20 MIN: CPT | Performed by: PHYSICIAN ASSISTANT

## 2021-09-16 PROCEDURE — A9577 INJ MULTIHANCE: HCPCS | Performed by: PHYSICIAN ASSISTANT

## 2021-09-16 PROCEDURE — A9577 INJ MULTIHANCE: HCPCS

## 2021-09-16 PROCEDURE — 0 GADOBENATE DIMEGLUMINE 529 MG/ML SOLUTION: Performed by: PHYSICIAN ASSISTANT

## 2021-09-16 PROCEDURE — 0 GADOBENATE DIMEGLUMINE 529 MG/ML SOLUTION

## 2021-09-16 RX ADMIN — GADOBENATE DIMEGLUMINE 20 ML: 529 INJECTION, SOLUTION INTRAVENOUS at 09:46

## 2021-09-16 NOTE — PROGRESS NOTES
Kennedy Reardon   1965   [id number]       09/16/2021     Chief Complaint   Patient presents with   • Follow-up     MRI        HPI   Mr. Reardon is a 56-year-old gentleman status post left craniotomy for a left temporal glioma on 8/28/2020.  The patient is treated with Keppra 500 mg twice daily with no breakthrough seizures, he is currently on Temodar once a month and tolerating his medications.  He is here with a 3-month follow-up brain MRI scan.  Overall the patient feels well.  His speech is clear, no difficulties with daily activities.    Chronic Illnesses:  Ongoing tobacco use, he is cut way down to only a few cigarettes per day.  Past Medical History:  No date: Brain cancer (CMS/HCC)  12/02/2020: History of radiation therapy      Comment:  left temporal lobe of brain  No date: Hyperlipidemia  No date: Hypertension  No date: Medical history reviewed with no changes  No date: Seizures (CMS/HCC)     Past Surgical History:   Procedure Laterality Date   • CRANIOTOMY FOR TUMOR Left 8/28/2020    Procedure: CRANIOTOMY FOR TUMOR LEFT;  Surgeon: Gilbert Harrell MD;  Location: [facility];  Service: Neurosurgery;  Laterality: Left;        No Known Allergies       Current Outpatient Medications:   •  aspirin 81 MG chewable tablet, Chew 81 mg Daily., Disp: , Rfl:   •  atenolol (TENORMIN) 50 MG tablet, Take 50 mg by mouth Daily., Disp: , Rfl:   •  cetirizine (zyrTEC) 10 MG tablet, Take 10 mg by mouth Daily., Disp: , Rfl:   •  levETIRAcetam (KEPPRA) 500 MG tablet, Take 1 tablet by mouth Every 12 (Twelve) Hours., Disp: 180 tablet, Rfl: 4  •  losartan (COZAAR) 50 MG tablet, Take 50 mg by mouth Daily., Disp: , Rfl:   •  lovastatin (MEVACOR) 20 MG tablet, Take 20 mg by mouth 2 (two) times a day., Disp: , Rfl:   •  ondansetron ODT (ZOFRAN-ODT) 4 MG disintegrating tablet, Place 1 tablet on the tongue Every 6 (Six) Hours As Needed for Nausea or Vomiting for up to 12 doses., Disp: 12 tablet, Rfl: 0  •  prochlorperazine (COMPAZINE)  "10 MG tablet, Take 1 tablet by mouth Every 8 (Eight) Hours As Needed for Nausea or Vomiting., Disp: 90 tablet, Rfl: 5  •  sulfamethoxazole-trimethoprim (BACTRIM DS,SEPTRA DS) 800-160 MG per tablet, Take 1 tablet by mouth 3 (Three) Times a Week. Take 1 tablet on Mondays, Wednesdays and Fridays., Disp: 12 tablet, Rfl: 7  •  docusate sodium 100 MG capsule, Take 1capsule by mouth 2 (Two) Times a Day As Needed for Constipation., Disp: 30 each, Rfl: 2  No current facility-administered medications for this visit.     Social History     Socioeconomic History   • Marital status:      Spouse name: Not on file   • Number of children: Not on file   • Years of education: Not on file   • Highest education level: Not on file   Tobacco Use   • Smoking status: Current Every Day Smoker     Packs/day: 0.50   • Smokeless tobacco: Never Used   Vaping Use   • Vaping Use: Never used   Substance and Sexual Activity   • Alcohol use: No     Comment: few beers every now and then   • Drug use: Defer   • Sexual activity: Defer        family history includes Cancer in his father and paternal grandfather; Diabetes in his paternal grandfather; Heart disease in his paternal grandfather and paternal grandmother; Hypertension in his mother; Osteoarthritis in his maternal grandfather and mother; Osteoporosis in his sister; Rheum arthritis in his maternal grandfather and mother.     Social History    Tobacco Use      Smoking status: Current Every Day Smoker        Packs/day: 0.50      Smokeless tobacco: Never Used       Body mass index is 38.14 kg/m².   Patient's Body mass index is 38.14 kg/m². indicating that he is obese (BMI >30). Obesity-related health conditions include the following: osteoarthritis. Obesity is unchanged. BMI is is above average; BMI management plan is completed.   /78 (BP Location: Right arm, Patient Position: Sitting, Cuff Size: Adult)   Pulse 78   Resp 20   Ht 165.1 cm (65\")   Wt 104 kg (229 lb 3.2 oz)   SpO2 " 93%   BMI 38.14 kg/m²    Physical Examination:  HEENT-wnl  Lungs-No wheezing or SOB    Neurologic Exam     Mental Status   Oriented to person, place, and time.   Attention: normal. Concentration: normal.   Speech: speech is normal   Level of consciousness: alert  Knowledge: good.     Cranial Nerves   Cranial nerves II through XII intact.     Motor Exam   Muscle bulk: normal  Overall muscle tone: normal    Sensory Exam   Light touch normal.     Gait, Coordination, and Reflexes     Gait  Gait: normal    Tremor   Resting tremor: absent  Intention tremor: absent  Action tremor: absent         Radiological Data Review:  I have independently interpreted the imaging and discussed the findings with patient and spouse.  Brain MRI scan shows postsurgical changes in the left temporal lobe with surrounding vasogenic edema essentially unchanged.  No new areas of enhancement, no increase in size of lesion.      Assessment and Plan:  1.  Left temporal glioma-treating with Temodar.  We will plan on follow-up MRI of the brain in 3 months.      Marti Burk, SHALONDA      PCP:  Feliberto Moore MD

## 2021-10-05 ENCOUNTER — TELEPHONE (OUTPATIENT)
Dept: ONCOLOGY | Facility: CLINIC | Age: 56
End: 2021-10-05

## 2021-10-05 NOTE — TELEPHONE ENCOUNTER
Caller: Delia Reardon    Relationship: Emergency Contact    Best call back number: [phone]    What is the best time to reach you: ASAP    Who are you requesting to speak with (clinical staff, provider,  specific staff member): NURSE    Do you know the name of the person who called:     What was the call regarding: CALLER SAYS PATIENT HAS MEMORY ISSUES AND NEEDS A VISITOR WITH HIM    Do you require a callback: YES

## 2021-10-06 ENCOUNTER — LAB (OUTPATIENT)
Dept: ONCOLOGY | Facility: CLINIC | Age: 56
End: 2021-10-06

## 2021-10-06 ENCOUNTER — OFFICE VISIT (OUTPATIENT)
Dept: ONCOLOGY | Facility: CLINIC | Age: 56
End: 2021-10-06

## 2021-10-06 VITALS
OXYGEN SATURATION: 98 % | HEART RATE: 71 BPM | WEIGHT: 233.4 LBS | SYSTOLIC BLOOD PRESSURE: 139 MMHG | DIASTOLIC BLOOD PRESSURE: 83 MMHG | HEIGHT: 69 IN | TEMPERATURE: 98 F | BODY MASS INDEX: 34.57 KG/M2 | RESPIRATION RATE: 18 BRPM

## 2021-10-06 DIAGNOSIS — C71.9 GLIOMA (HCC): Primary | ICD-10-CM

## 2021-10-06 DIAGNOSIS — G93.89 BRAIN MASS: ICD-10-CM

## 2021-10-06 LAB
ALBUMIN SERPL-MCNC: 4.29 G/DL (ref 3.5–5.2)
ALBUMIN/GLOB SERPL: 1.6 G/DL
ALP SERPL-CCNC: 91 U/L (ref 39–117)
ALT SERPL W P-5'-P-CCNC: 24 U/L (ref 1–41)
ANION GAP SERPL CALCULATED.3IONS-SCNC: 9.9 MMOL/L (ref 5–15)
AST SERPL-CCNC: 18 U/L (ref 1–40)
BASOPHILS # BLD AUTO: 0.07 10*3/MM3 (ref 0–0.2)
BASOPHILS NFR BLD AUTO: 1 % (ref 0–1.5)
BILIRUB SERPL-MCNC: 0.4 MG/DL (ref 0–1.2)
BUN SERPL-MCNC: 7 MG/DL (ref 6–20)
BUN/CREAT SERPL: 8.8 (ref 7–25)
CALCIUM SPEC-SCNC: 9 MG/DL (ref 8.6–10.5)
CHLORIDE SERPL-SCNC: 101 MMOL/L (ref 98–107)
CO2 SERPL-SCNC: 25.1 MMOL/L (ref 22–29)
CREAT SERPL-MCNC: 0.8 MG/DL (ref 0.76–1.27)
DEPRECATED RDW RBC AUTO: 42.3 FL (ref 37–54)
EOSINOPHIL # BLD AUTO: 0.23 10*3/MM3 (ref 0–0.4)
EOSINOPHIL NFR BLD AUTO: 3.4 % (ref 0.3–6.2)
ERYTHROCYTE [DISTWIDTH] IN BLOOD BY AUTOMATED COUNT: 12.4 % (ref 12.3–15.4)
GFR SERPL CREATININE-BSD FRML MDRD: 100 ML/MIN/1.73
GLOBULIN UR ELPH-MCNC: 2.7 GM/DL
GLUCOSE SERPL-MCNC: 114 MG/DL (ref 65–99)
HCT VFR BLD AUTO: 43.2 % (ref 37.5–51)
HGB BLD-MCNC: 14.4 G/DL (ref 13–17.7)
IMM GRANULOCYTES # BLD AUTO: 0.02 10*3/MM3 (ref 0–0.05)
IMM GRANULOCYTES NFR BLD AUTO: 0.3 % (ref 0–0.5)
LYMPHOCYTES # BLD AUTO: 1.74 10*3/MM3 (ref 0.7–3.1)
LYMPHOCYTES NFR BLD AUTO: 25.7 % (ref 19.6–45.3)
MCH RBC QN AUTO: 30.6 PG (ref 26.6–33)
MCHC RBC AUTO-ENTMCNC: 33.3 G/DL (ref 31.5–35.7)
MCV RBC AUTO: 91.9 FL (ref 79–97)
MONOCYTES # BLD AUTO: 0.63 10*3/MM3 (ref 0.1–0.9)
MONOCYTES NFR BLD AUTO: 9.3 % (ref 5–12)
NEUTROPHILS NFR BLD AUTO: 4.07 10*3/MM3 (ref 1.7–7)
NEUTROPHILS NFR BLD AUTO: 60.3 % (ref 42.7–76)
NRBC BLD AUTO-RTO: 0 /100 WBC (ref 0–0.2)
PLATELET # BLD AUTO: 213 10*3/MM3 (ref 140–450)
PMV BLD AUTO: 9.6 FL (ref 6–12)
POTASSIUM SERPL-SCNC: 4.7 MMOL/L (ref 3.5–5.2)
PROT SERPL-MCNC: 7 G/DL (ref 6–8.5)
RBC # BLD AUTO: 4.7 10*6/MM3 (ref 4.14–5.8)
SODIUM SERPL-SCNC: 136 MMOL/L (ref 136–145)
WBC # BLD AUTO: 6.76 10*3/MM3 (ref 3.4–10.8)

## 2021-10-06 PROCEDURE — 90686 IIV4 VACC NO PRSV 0.5 ML IM: CPT | Performed by: INTERNAL MEDICINE

## 2021-10-06 PROCEDURE — 80053 COMPREHEN METABOLIC PANEL: CPT | Performed by: INTERNAL MEDICINE

## 2021-10-06 PROCEDURE — 85025 COMPLETE CBC W/AUTO DIFF WBC: CPT | Performed by: INTERNAL MEDICINE

## 2021-10-06 PROCEDURE — 99214 OFFICE O/P EST MOD 30 MIN: CPT | Performed by: INTERNAL MEDICINE

## 2021-10-06 PROCEDURE — 90471 IMMUNIZATION ADMIN: CPT | Performed by: INTERNAL MEDICINE

## 2021-10-06 NOTE — PROGRESS NOTES
Name:  Kennedy Reardon  :  1965  Date:  10/6/2021     REFERRING PHYSICIAN  Gilbert Harrell MD    PRIMARY CARE PHYSICIAN  Feliberto Moore MD    REASON FOR FOLLOWUP  1. Glioma (HCC)      CHIEF COMPLAINT  None.    Dear Dr. Ag,    HISTORY OF PRESENT ILLNESS:   I saw Mr. Reardon in follow up today in our medical oncology clinic. As you are aware, he is a pleasant, 56 y.o., white male with a history of minimal medical problems who was in his usual state of health until late 2020 when he suffered a couple of seizures a few days apart. He presented to our ED following the second episode and was med-flighted to [facility] due to concerns that he was suffering a stroke. Ultimately, an MRI of the brain identified a cystic mass in the left temporoparietal region; and you took him to the OR on 2020. The final, surgical pathology was consistent with glioblastoma multiforme. He recovered uneventfully from his craniotomy, and he was subsequently referred to our clinic for assistance with his management closer to his home in [place], [place].     INTERIM HISTORY:  Mr. Reardon presents today for follow up again accompanied by his wife. He completed concomitant, daily PO Temodar and XRT on 2020 and tolerated this regimen well, with mild nausea as his only reported noticeable side effects. He was subsequently agreeable to transitioning to qmonthly, PO Temodar alone and began this regimen on 2021 (taking the Temodar from -15). He has now completed a total of ~nine (9) cycles and continues to tolerate this treatment well, with mild, but very tolerable, nausea for the first couple of days of each cycle (he has still not even had to take any antiemetics). He again denies any specific complaints.    Past Medical History:   Diagnosis Date   • Brain cancer (HCC)    • History of radiation therapy 2020    left temporal lobe of brain   • Hyperlipidemia    • Hypertension    • Medical  history reviewed with no changes    • Seizures (HCC)        Past Surgical History:   Procedure Laterality Date   • CRANIOTOMY FOR TUMOR Left 8/28/2020    Procedure: CRANIOTOMY FOR TUMOR LEFT;  Surgeon: Gilbert Harrell MD;  Location: [facility];  Service: Neurosurgery;  Laterality: Left;       Social History     Socioeconomic History   • Marital status:      Spouse name: Not on file   • Number of children: Not on file   • Years of education: Not on file   • Highest education level: Not on file   Tobacco Use   • Smoking status: Current Every Day Smoker     Packs/day: 0.50   • Smokeless tobacco: Never Used   Vaping Use   • Vaping Use: Never used   Substance and Sexual Activity   • Alcohol use: No     Comment: few beers every now and then   • Drug use: Defer   • Sexual activity: Defer       Family History   Problem Relation Age of Onset   • Osteoarthritis Mother    • Rheum arthritis Mother    • Hypertension Mother    • Cancer Father    • Osteoporosis Sister    • Osteoarthritis Maternal Grandfather    • Rheum arthritis Maternal Grandfather    • Heart disease Paternal Grandmother    • Cancer Paternal Grandfather    • Heart disease Paternal Grandfather    • Diabetes Paternal Grandfather        No Known Allergies     .ECOG score: 0               Current Outpatient Medications   Medication Sig Dispense Refill   • aspirin 81 MG chewable tablet Chew 81 mg Daily.     • atenolol (TENORMIN) 50 MG tablet Take 50 mg by mouth Daily.     • cetirizine (zyrTEC) 10 MG tablet Take 10 mg by mouth Daily.     • levETIRAcetam (KEPPRA) 500 MG tablet Take 1 tablet by mouth Every 12 (Twelve) Hours. 180 tablet 4   • losartan (COZAAR) 50 MG tablet Take 50 mg by mouth Daily.     • lovastatin (MEVACOR) 20 MG tablet Take 20 mg by mouth 2 (two) times a day.     • ondansetron ODT (ZOFRAN-ODT) 4 MG disintegrating tablet Place 1 tablet on the tongue Every 6 (Six) Hours As Needed for Nausea or Vomiting for up to 12 doses. 12 tablet 0   •  "prochlorperazine (COMPAZINE) 10 MG tablet Take 1 tablet by mouth Every 8 (Eight) Hours As Needed for Nausea or Vomiting. 90 tablet 5   • temozolomide (TEMODAR) 140 MG chemo capsule Take 1 capsule by mouth with 1 other temozolomide prescription for 320 mg total Daily for 5 days. Take 140 mg + 180 mg capsule on days 1-5 of each cycle. 5 capsule 3   • temozolomide (TEMODAR) 180 MG chemo capsule Take 1 capsule by mouth with 1 other temozolomide prescription for 320 mg total Daily for 5 days. Take 140 mg + 180 mg capsule on days 1-5 of each cycle. 5 capsule 3     No current facility-administered medications for this visit.     REVIEW OF SYSTEMS  CONSTITUTIONAL:  No fever, chills or night sweats.  EYES:  No blurry vision, diplopia or other vision changes. Improved photosensitivity, no longer wearing sunglasses indoors (as he was doing at the time of his initial consultation).  ENT:  No hearing loss, nosebleeds or sore throat.  CARDIOVASCULAR:  No palpitations, arrhythmia, syncopal episodes or edema.  PULMONARY:  No hemoptysis, wheezing, chronic cough or shortness of breath.  GASTROINTESTINAL:  No constipation or diarrhea. No abdominal pain. Mild, nausea for the first couple of days after starting each cycle of qmonthly, PO Temodar, as per the HPI above.  GENITOURINARY:  No hematuria, kidney stones or frequent urination.  MUSCULOSKELETAL:  No joint or back pains.  INTEGUMENTARY: No rashes or pruritus.  ENDOCRINE:  No excessive thirst or hot flashes.  HEMATOLOGIC:  No history of free bleeding, spontaneous bleeding or clotting.  IMMUNOLOGIC:  No allergies or frequent infections.  NEUROLOGIC: As per the HPI above.  PSYCHIATRIC:  No anxiety or depression.    PHYSICAL EXAMINATION  /83   Pulse 71   Temp 98 °F (36.7 °C) (Temporal)   Resp 18   Ht 175.3 cm (69\")   Wt 106 kg (233 lb 6.4 oz)   SpO2 98%   BMI 34.47 kg/m²     Pain Score:  Pain Score    10/06/21 1108   PainSc: 0-No pain     PHQ-Score Total:  PHQ-9 Total " Score:      ECO  GENERAL:  A well-developed, well-nourished, white male in no acute distress.  HEENT:  Pupils equally round reactive to light. Extraocular muscles intact.  CARDIOVASCULAR:  Regular rate and rhythm. No murmurs, gallops or rubs.  LUNGS:  Clear to auscultation bilaterally.  ABDOMEN:  Soft, nontender, nondistended with positive bowel sounds.  EXTREMITIES:  No clubbing, cyanosis or edema bilaterally.  SKIN:  No rashes or petechiae.  NEURO:  No focal deficits.  PSYCH:  Alert and oriented x3.    LABORATORY  Lab Results   Component Value Date    WBC 6.76 10/06/2021    HGB 14.4 10/06/2021    HCT 43.2 10/06/2021    MCV 91.9 10/06/2021     10/06/2021    NEUTROABS 4.07 10/06/2021       Lab Results   Component Value Date     2021    K 4.7 2021     2021    CO2 26.1 2021    BUN 6 2021    CREATININE 0.92 2021    GLUCOSE 114 (H) 2021    CALCIUM 9.3 2021    AST 20 2021    ALT 26 2021    ALKPHOS 98 2021    BILITOT 0.4 2021    PROTEINTOT 7.2 2021    ALBUMIN 4.26 2021     CBC (10/06/2021): WBCs: 6.76; HgB: 14.4; Hct: 43.2; platelets: 213  CBC (2021): WBCs: 6.48; HgB: 14.3; Hct: 42.6; platelets: 204  CBC (2021): WBCs: 7.1; HgB: 15.5; Hct: 46.4; platelets: 208  CBC (2021): WBCs: 7.4; HgB: 14.9; Hct: 44.1; platelets: 240  CBC (2021): WBCs: 7.4; HgB: 15.1; Hct: 45.5; platelets: 244  CBC (2021): WBCs: 7.0; HgB: 15.3; Hct: 45.3; platelets: 244  CBC (2021): WBCs: 7.4; HgB: 15.0; Hct: 45.5; platelets: 182  CBC (2020): WBCs: 7.8; HgB: 15.0; Hct: 45.0; platelets: 258    IMAGING  MRI brain with contrast (2020):  Impression: Large cystic mass identified in the left temporoparietal region with surgical markers in place for surgical planning. The largest axial dimension of the nodular component is 2.5 x 1.6 cm.    MRI brain with and without contrast (2020):  Impression:  Postsurgical changes left temporal craniotomy and resection with expected early postoperative changes including dural enhancement mildly prominent along the left inferolateral margin of the resection site with attention on followup otherwise, no residual soft tissue nodular enhancement or mass-occupying focus of enhancement. No midline shift or hydrocephalus.    MRI brain with and without contrast (10/01/2020):  Impression: T2 signal abnormality surrounding a left temporal mass measuring approximately 1.8 x 2.9 cm that shows predominantly peripheral enhancement. Some postsurgical changes noted overlying this region, but no prior examination is available for comparison.    MRI brain without contrast (12/03/2020):  Impression: The left temporal lobe lesion is slightly smaller now with several cystic areas in the lesion. There is less edema than on the previous exam in 10/2020.    MRI brain with and without contrast (02/04/2021):  Impression: [Remains stable per neurosurgery interpretation.]    MRI brain with and without contrast (04/01/2021):  Impression:  1) Based on retrospective remeasurement, no interval change in size of enhancing mass left temporal lobe with cystic components and no change in the degree of peritumoral signal abnormality/vasogenic edema. Today's measurements are 3.0 x 2.4 cm.  2) Localized mass effect but no hydrocephalus or midline shift.  3) No new enhancing brain lesions identified.    MRI brain with and without contrast (04/29/2021):  Impression: Again there is a peripherally enhancing mass in the right temporal lobe again measuring about 2.1 x 2.6 cm. Again there is surrounding vasogenic edema.    MRI brain with and without contrast (06/25/2021):  Impression:  1) Contrast-enhancing mass in the left temporal lobe very similar to the previous exam. Surrounding vasogenic edema again noted.  2) No new contrast-enhancing lesions.    MRI brain with and without contrast (09/16/2021, compared to  06/25/2021):  Impression:  1) Given differences in technique, there is no interval change in the predominantly left temporal region enhancing process subjacent to the craniotomy site with no change in the degree of vasogenic edema or peritumoral signal abnormality. Lesion is approximately 2.5 x 2.1 cm and was previously 2.6 x 2.2 cm.  2) There remains no evidence of midline shift or hydrocephalus.  3) No new areas of pathologic enhancement identified. No acute intracranial findings are noted.    PATHOLOGY  Temporal lobe mass (08/28/2020):  Glioblastoma multiforme. IDH1/IDH2 mutation not detected. 1p/19q co-deletion not detected. MGMT gene promotor methylation: Detected.    IMPRESSION AND PLAN  Mr. Reardon is a 56 y.o., white male with:  1. Glioblastoma multiforme: Initially diagnosed in late August 2020 following a couple of seizure events a few days apart and status post craniotomy with resection of a left, temporoparietal region, cystic mass on 08/27/2020. The final surgical pathology results are summarized above, and he recovered well from this procedure. I have had multiple, long discussions with the patient (+/- his wife) since the time of his initial consultation in our clinic (on 09/30/2020) regarding this diagnosis and its prognosis, reiterating much of what they were previously told by [facility] neurosurgery. They remain aware that his diagnosis is, unfortunately, ultimately very poor; however, this malignancy was, and remains, potentially treatable and sustained remissions are possible. Adjuvant, concomitant chemotherapy and radiation was recommended. He began a ~six-week course of this regimen (with temozolomide 75 mg/m2 daily with XRT; patient dose: 160 mg) by mid-October 2020 and completed it by early December 2020, overall tolerating this regimen well, with some mild, manageable nausea as his only noticeable side effect. He had a repeat MRI with [facility] neurosurgery on 12/03/2020,  following the conclusion of his chemo/XRT, which showed overall improvement (summarized above). Subsequently, he was felt to be a good candidate to transition to adjuvant, qmonthly PO Temodar alone (150 mg/m2 on days 1-5 of 28-day cycles; patient dose: 320 mg). He began this qmonthly therapy in mid-January 2021. He has now completed a total of nine (9) cycles, and he continues to tolerate this regimen overall well (with some mild, manageable nausea for the first couple of days as his only noticeable side effect; he has still not had to take any antiemetics). Meanwhile, the most recent repeat MRI of the brain (performed on 09/16/2021 and summarized above) remained, once again, overall unchanged. We will proceed with this current, adjuvant treatment plan (day #1 of cycle #10 scheduled shortly). We will continue to check CBCs and CMPs on a qmonthly basis, just prior to the start of each cycle of Temodar. He will follow up with neurosurgery again in ~late December with another repeat MRI of the brain, as previously planned. We will see him back in our clinic in three months, shortly after these (the MRI and NS) appointments and just prior to the start of the potential thirteenth cycle of qmonthly Temodar, with a CBC and CMP.  2. Health maintenance: The patient is agreeable to receiving the influenza vaccine today through our clinic, and he plans to get a COVID booster next week.  The patient and his wife were in agreement with these plans.    It is a pleasure to participate in Mr. Reardon's care. Please do not hesitate to call with any questions or concerns that you may have.    A total of 30 minutes were spent coordinating this patient’s care in clinic today; more than 50% of this time was face-to-face with the patient and his wife, reviewing his interim medical history, discussing the results of recent labwork and repeat MRI of the brain and counseling on the current treatment and followup plan. All questions were  answered to their satisfaction.    FOLLOW UP  Flu vaccine today (and COVID booster next week). Proceed with adjuvant, qmonthly, PO Temodar alone, as planned (cycle #10 scheduled to start the first of next week). With neurosurgery with a repeat MRI of the brain in late December, as previously planned. Return to our clinic in 3 months, shortly after the next MRI and NS appointments and just prior to the start of the potential 13th cycle of qmonthly Temodar, respectively, with a CBC and CMP. Qmonthly CBCs and CMPs between now and then (just prior to the start of the 11th and 12th cycles of Temodar).            This document was electronically signed by KEON Casey MD October 6, 2021 11:22 EDT      CC: MD Zena Mason MD James W. Killian, MD
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
## 2021-11-01 ENCOUNTER — TELEPHONE (OUTPATIENT)
Dept: ONCOLOGY | Facility: CLINIC | Age: 56
End: 2021-11-01

## 2021-11-01 NOTE — TELEPHONE ENCOUNTER
Caller: Cinthia Reardon    Relationship to patient: Emergency Contact    Best call back number: [phone]    Chief complaint: RESCHEDULE    Type of visit: LAB    Requested date: 11/3    If rescheduling, when is the original appointment: 11/5    Additional notes: PT WIFE CINTHIA CALLED REQUESTING TO RESCHEDULE LAB APPT CHANGED FROM 11/5 TO 11/3. PLEASE CALL CINTHIA BACK TO RESCHEDULE PT APPT

## 2021-11-01 NOTE — TELEPHONE ENCOUNTER
PTS WIFE CALLED BACK AFTER MISSING A CALL FROM THE OFFICE. PTS WIFE IS HAVING TROUBLE WITH HER PHONE SERVICE SO WARM TRANSFERRED HER TO THE OFFICE WITH AMBIKA TO GET APPT RESCHEDULED.

## 2021-11-03 ENCOUNTER — LAB (OUTPATIENT)
Dept: ONCOLOGY | Facility: CLINIC | Age: 56
End: 2021-11-03

## 2021-11-03 VITALS
RESPIRATION RATE: 18 BRPM | SYSTOLIC BLOOD PRESSURE: 135 MMHG | DIASTOLIC BLOOD PRESSURE: 82 MMHG | OXYGEN SATURATION: 96 % | TEMPERATURE: 98.4 F | HEART RATE: 74 BPM

## 2021-11-03 DIAGNOSIS — C71.9 GLIOMA (HCC): ICD-10-CM

## 2021-11-03 LAB
ALBUMIN SERPL-MCNC: 4.3 G/DL (ref 3.5–5.2)
ALBUMIN/GLOB SERPL: 1.6 G/DL
ALP SERPL-CCNC: 94 U/L (ref 39–117)
ALT SERPL W P-5'-P-CCNC: 23 U/L (ref 1–41)
ANION GAP SERPL CALCULATED.3IONS-SCNC: 9.4 MMOL/L (ref 5–15)
AST SERPL-CCNC: 17 U/L (ref 1–40)
BASOPHILS # BLD AUTO: 0.05 10*3/MM3 (ref 0–0.2)
BASOPHILS NFR BLD AUTO: 0.8 % (ref 0–1.5)
BILIRUB SERPL-MCNC: 0.3 MG/DL (ref 0–1.2)
BUN SERPL-MCNC: 7 MG/DL (ref 6–20)
BUN/CREAT SERPL: 8.4 (ref 7–25)
CALCIUM SPEC-SCNC: 9 MG/DL (ref 8.6–10.5)
CHLORIDE SERPL-SCNC: 101 MMOL/L (ref 98–107)
CO2 SERPL-SCNC: 25.6 MMOL/L (ref 22–29)
CREAT SERPL-MCNC: 0.83 MG/DL (ref 0.76–1.27)
DEPRECATED RDW RBC AUTO: 40.8 FL (ref 37–54)
EOSINOPHIL # BLD AUTO: 0.28 10*3/MM3 (ref 0–0.4)
EOSINOPHIL NFR BLD AUTO: 4.7 % (ref 0.3–6.2)
ERYTHROCYTE [DISTWIDTH] IN BLOOD BY AUTOMATED COUNT: 12.2 % (ref 12.3–15.4)
GFR SERPL CREATININE-BSD FRML MDRD: 96 ML/MIN/1.73
GLOBULIN UR ELPH-MCNC: 2.7 GM/DL
GLUCOSE SERPL-MCNC: 124 MG/DL (ref 65–99)
HCT VFR BLD AUTO: 42.5 % (ref 37.5–51)
HGB BLD-MCNC: 14.6 G/DL (ref 13–17.7)
IMM GRANULOCYTES # BLD AUTO: 0.01 10*3/MM3 (ref 0–0.05)
IMM GRANULOCYTES NFR BLD AUTO: 0.2 % (ref 0–0.5)
LYMPHOCYTES # BLD AUTO: 1.84 10*3/MM3 (ref 0.7–3.1)
LYMPHOCYTES NFR BLD AUTO: 30.6 % (ref 19.6–45.3)
MCH RBC QN AUTO: 31.2 PG (ref 26.6–33)
MCHC RBC AUTO-ENTMCNC: 34.4 G/DL (ref 31.5–35.7)
MCV RBC AUTO: 90.8 FL (ref 79–97)
MONOCYTES # BLD AUTO: 0.67 10*3/MM3 (ref 0.1–0.9)
MONOCYTES NFR BLD AUTO: 11.1 % (ref 5–12)
NEUTROPHILS NFR BLD AUTO: 3.16 10*3/MM3 (ref 1.7–7)
NEUTROPHILS NFR BLD AUTO: 52.6 % (ref 42.7–76)
NRBC BLD AUTO-RTO: 0 /100 WBC (ref 0–0.2)
PLATELET # BLD AUTO: 230 10*3/MM3 (ref 140–450)
PMV BLD AUTO: 9.4 FL (ref 6–12)
POTASSIUM SERPL-SCNC: 4.4 MMOL/L (ref 3.5–5.2)
PROT SERPL-MCNC: 7 G/DL (ref 6–8.5)
RBC # BLD AUTO: 4.68 10*6/MM3 (ref 4.14–5.8)
SODIUM SERPL-SCNC: 136 MMOL/L (ref 136–145)
WBC # BLD AUTO: 6.01 10*3/MM3 (ref 3.4–10.8)

## 2021-11-03 PROCEDURE — 85025 COMPLETE CBC W/AUTO DIFF WBC: CPT | Performed by: INTERNAL MEDICINE

## 2021-11-03 PROCEDURE — 80053 COMPREHEN METABOLIC PANEL: CPT | Performed by: INTERNAL MEDICINE

## 2021-11-22 ENCOUNTER — TELEPHONE (OUTPATIENT)
Dept: NEUROSURGERY | Facility: CLINIC | Age: 56
End: 2021-11-22

## 2021-11-22 RX ORDER — LEVETIRACETAM 500 MG/1
500 TABLET ORAL EVERY 12 HOURS SCHEDULED
Qty: 180 TABLET | Refills: 4 | Status: SHIPPED | OUTPATIENT
Start: 2021-11-22 | End: 2023-02-15

## 2021-11-22 NOTE — TELEPHONE ENCOUNTER
Provider:   Wm  Caller: Delia  Time of call:  [phone]   Phone #:  [phone]  Surgery:  Craniotomy  Surgery Date: 8-   Last visit:  9-16-21   Next visit: 12-2-21    JASON:         Reason for call: Patient's wife called and ask if we could refill the patient's Keppra? Please Advise. Thank you.       Requested Prescriptions     Pending Prescriptions Disp Refills   • levETIRAcetam (KEPPRA) 500 MG tablet 180 tablet 4     Sig: Take 1 tablet by mouth Every 12 (Twelve) Hours.

## 2021-11-22 NOTE — TELEPHONE ENCOUNTER
Caller: CINTHIA COREAS     Relationship: SPOUSE     Best call back number: [phone]    Requested Prescriptions: KEPPRA 500 MG     Pharmacy where request should be sent: [id number]     Additional details provided by patient: N/A

## 2021-11-29 ENCOUNTER — TELEPHONE (OUTPATIENT)
Dept: ONCOLOGY | Facility: CLINIC | Age: 56
End: 2021-11-29

## 2021-11-29 ENCOUNTER — SPECIALTY PHARMACY (OUTPATIENT)
Dept: PHARMACY | Facility: HOSPITAL | Age: 56
End: 2021-11-29

## 2021-11-29 DIAGNOSIS — G93.89 BRAIN MASS: ICD-10-CM

## 2021-11-29 RX ORDER — TEMOZOLOMIDE 180 MG/1
180 CAPSULE ORAL DAILY
Qty: 5 CAPSULE | Refills: 3 | Status: SHIPPED | OUTPATIENT
Start: 2021-11-29 | End: 2022-01-01 | Stop reason: SDUPTHER

## 2021-11-29 RX ORDER — TEMOZOLOMIDE 140 MG/1
140 CAPSULE ORAL DAILY
Qty: 5 CAPSULE | Refills: 3 | Status: SHIPPED | OUTPATIENT
Start: 2021-11-29 | End: 2022-01-01 | Stop reason: SDUPTHER

## 2021-11-29 NOTE — TELEPHONE ENCOUNTER
Caller: Delia Reardon    Relationship to patient: Emergency Contact    Best call back number: [phone]    Chief complaint: PTS WIFE WAS CALLING TO SEE WHEN PTS NEXT APPT WAS. THEY WOULD LIKE TO GET BLOOD WORK AGAIN ON 12/2 WHEN THEY ARE AT ANOTHER ONE OF HIS APPTS IF POSSIBLE.     Additional notes: PLS CALL PTS WIFE TO SCHEDULE APPT.

## 2021-11-29 NOTE — TELEPHONE ENCOUNTER
Caller: ReardonDelia    Relationship: Emergency Contact    Best call back number: [phone]    Requested Prescriptions:   TEMOZOLOMIDE 140MG   TEMOZOLOMIDE 180MG     Pharmacy where request should be sent: [facility]          Does the patient have less than a 3 day supply:  [x] Yes  [] No    Barron NGAY Rep   11/29/21 10:51 EST

## 2021-12-02 NOTE — PROGRESS NOTES
Kennedy Reardon   1965   [id number]       12/02/2021     Chief Complaint   Patient presents with   • Follow-up        HPI   56 year old male presents to the clinic today for follow-up on Glioma. Currently still taking Temodar 5 days per month and seems to be tolerating his medications. He is here with a new MRI for review.    Chronic illnesses:  Tobacco abuse  Past Medical History:  No date: Brain cancer (HCC)  12/02/2020: History of radiation therapy      Comment:  left temporal lobe of brain  No date: Hyperlipidemia  No date: Hypertension  No date: Medical history reviewed with no changes  No date: Seizures (HCC)     Past Surgical History:   Procedure Laterality Date   • CRANIOTOMY FOR TUMOR Left 8/28/2020    Procedure: CRANIOTOMY FOR TUMOR LEFT;  Surgeon: Gilbert Harrell MD;  Location: [facility];  Service: Neurosurgery;  Laterality: Left;        No Known Allergies       Current Outpatient Medications:   •  aspirin 81 MG chewable tablet, Chew 81 mg Daily., Disp: , Rfl:   •  atenolol (TENORMIN) 50 MG tablet, Take 50 mg by mouth Daily., Disp: , Rfl:   •  cetirizine (zyrTEC) 10 MG tablet, Take 10 mg by mouth Daily., Disp: , Rfl:   •  levETIRAcetam (KEPPRA) 500 MG tablet, Take 1 tablet by mouth Every 12 (Twelve) Hours., Disp: 180 tablet, Rfl: 4  •  losartan (COZAAR) 50 MG tablet, Take 50 mg by mouth Daily., Disp: , Rfl:   •  lovastatin (MEVACOR) 20 MG tablet, Take 20 mg by mouth 2 (two) times a day., Disp: , Rfl:   •  ondansetron ODT (ZOFRAN-ODT) 4 MG disintegrating tablet, Place 1 tablet on the tongue Every 6 (Six) Hours As Needed for Nausea or Vomiting for up to 12 doses., Disp: 12 tablet, Rfl: 0  •  prochlorperazine (COMPAZINE) 10 MG tablet, Take 1 tablet by mouth Every 8 (Eight) Hours As Needed for Nausea or Vomiting., Disp: 90 tablet, Rfl: 5  •  temozolomide (TEMODAR) 140 MG chemo capsule, Take 1 capsule by mouth with 1 other temozolomide prescription for 320 mg total Daily for 5 days. Take 140 mg + 180 mg  capsule on days 1-5 of each cycle., Disp: 5 capsule, Rfl: 3  •  temozolomide (TEMODAR) 180 MG chemo capsule, Take 1 capsule by mouth with 1 other temozolomide prescription for 320 mg total Daily for 5 days. Take 140 mg + 180 mg capsule on days 1-5 of each cycle., Disp: 5 capsule, Rfl: 3  No current facility-administered medications for this visit.     Social History     Socioeconomic History   • Marital status:    Tobacco Use   • Smoking status: Current Every Day Smoker     Packs/day: 0.50   • Smokeless tobacco: Never Used   Vaping Use   • Vaping Use: Never used   Substance and Sexual Activity   • Alcohol use: No     Comment: few beers every now and then   • Drug use: Defer   • Sexual activity: Defer        family history includes Cancer in his father and paternal grandfather; Diabetes in his paternal grandfather; Heart disease in his paternal grandfather and paternal grandmother; Hypertension in his mother; Osteoarthritis in his maternal grandfather and mother; Osteoporosis in his sister; Rheum arthritis in his maternal grandfather and mother.     Social History    Tobacco Use      Smoking status: Current Every Day Smoker        Packs/day: 0.50      Smokeless tobacco: Never Used       There is no height or weight on file to calculate BMI.   Patient's There is no height or weight on file to calculate BMI. indicating that he is obese (BMI >30). Obesity-related health conditions include the following: osteoarthritis. Obesity is unchanged. BMI is is above average; BMI management plan is completed.     There were no vitals taken for this visit.   Physical Examination:  HEENT-wnl  Lungs-No wheezing or SOB    Neurologic Exam   Cranial nerves II through XII are intact.  Gait is normal.    Radiological Data Review:  I have independently reviewed the imaging and discussed the findings with the patient   Brain MRI scan is stable from scan 3 months ago. The enhancing glioma in the left temporal lobe is slightly smaller  in size with less surrounding edema.    Laboratory data revealed IDH 1/IDH two mutation, not detected.   TERT gene promoter mutation detected, -124C>T. MGMT gene promoter methylation detected, 30.56. 1P/19 Q co-deletion, not detected.    Assessment and Plan:  1. 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 Postop radiation therapy 12/2/2020. Follow-up brain MRI scan is stable. We will plan on follow-up in 6 months  2. Continue Temodar  3. History of seizures  4. Hyperlipidemia  5. Hypertension  6. Tobacco abuse, half pack per day    Marti Burk, SHALONDA      PCP:  Feliberto Moore MD

## 2022-01-01 ENCOUNTER — TELEPHONE (OUTPATIENT)
Dept: GENERAL RADIOLOGY | Facility: HOSPITAL | Age: 57
End: 2022-01-01

## 2022-01-01 ENCOUNTER — LAB (OUTPATIENT)
Dept: ONCOLOGY | Facility: CLINIC | Age: 57
End: 2022-01-01

## 2022-01-01 ENCOUNTER — APPOINTMENT (OUTPATIENT)
Dept: MRI IMAGING | Facility: HOSPITAL | Age: 57
End: 2022-01-01

## 2022-01-01 ENCOUNTER — OFFICE VISIT (OUTPATIENT)
Dept: ONCOLOGY | Facility: CLINIC | Age: 57
End: 2022-01-01

## 2022-01-01 ENCOUNTER — TELEPHONE (OUTPATIENT)
Dept: ONCOLOGY | Facility: CLINIC | Age: 57
End: 2022-01-01

## 2022-01-01 ENCOUNTER — APPOINTMENT (OUTPATIENT)
Dept: ONCOLOGY | Facility: HOSPITAL | Age: 57
End: 2022-01-01

## 2022-01-01 ENCOUNTER — DOCUMENTATION (OUTPATIENT)
Dept: ONCOLOGY | Facility: HOSPITAL | Age: 57
End: 2022-01-01

## 2022-01-01 ENCOUNTER — SPECIALTY PHARMACY (OUTPATIENT)
Dept: PHARMACY | Facility: HOSPITAL | Age: 57
End: 2022-01-01

## 2022-01-01 ENCOUNTER — APPOINTMENT (OUTPATIENT)
Dept: GENERAL RADIOLOGY | Facility: HOSPITAL | Age: 57
End: 2022-01-01

## 2022-01-01 ENCOUNTER — INFUSION (OUTPATIENT)
Dept: ONCOLOGY | Facility: HOSPITAL | Age: 57
End: 2022-01-01

## 2022-01-01 ENCOUNTER — TELEPHONE (OUTPATIENT)
Dept: NEUROSURGERY | Facility: CLINIC | Age: 57
End: 2022-01-01

## 2022-01-01 ENCOUNTER — HOSPITAL ENCOUNTER (OUTPATIENT)
Dept: MRI IMAGING | Facility: HOSPITAL | Age: 57
Discharge: HOME | End: 2022-11-15
Admitting: INTERNAL MEDICINE

## 2022-01-01 ENCOUNTER — HOSPITAL ENCOUNTER (INPATIENT)
Facility: HOSPITAL | Age: 57
LOS: 1 days | End: 2022-11-30
Attending: STUDENT IN AN ORGANIZED HEALTH CARE EDUCATION/TRAINING PROGRAM | Admitting: INTERNAL MEDICINE

## 2022-01-01 ENCOUNTER — APPOINTMENT (OUTPATIENT)
Dept: CT IMAGING | Facility: HOSPITAL | Age: 57
End: 2022-01-01

## 2022-01-01 ENCOUNTER — OFFICE VISIT (OUTPATIENT)
Dept: NEUROSURGERY | Facility: CLINIC | Age: 57
End: 2022-01-01

## 2022-01-01 ENCOUNTER — LAB (OUTPATIENT)
Dept: ONCOLOGY | Facility: HOSPITAL | Age: 57
End: 2022-01-01

## 2022-01-01 ENCOUNTER — TELEPHONE (OUTPATIENT)
Dept: ONCOLOGY | Facility: OTHER | Age: 57
End: 2022-01-01

## 2022-01-01 ENCOUNTER — HOSPITAL ENCOUNTER (OUTPATIENT)
Dept: MRI IMAGING | Facility: HOSPITAL | Age: 57
Discharge: HOME | End: 2022-09-20
Admitting: STUDENT IN AN ORGANIZED HEALTH CARE EDUCATION/TRAINING PROGRAM

## 2022-01-01 ENCOUNTER — HOSPITAL ENCOUNTER (OUTPATIENT)
Dept: MRI IMAGING | Facility: HOSPITAL | Age: 57
Discharge: HOME | End: 2022-06-02
Admitting: PHYSICIAN ASSISTANT

## 2022-01-01 VITALS
SYSTOLIC BLOOD PRESSURE: 140 MMHG | TEMPERATURE: 98.4 F | OXYGEN SATURATION: 96 % | BODY MASS INDEX: 34.02 KG/M2 | RESPIRATION RATE: 18 BRPM | DIASTOLIC BLOOD PRESSURE: 83 MMHG | HEART RATE: 69 BPM | WEIGHT: 230.4 LBS

## 2022-01-01 VITALS
RESPIRATION RATE: 18 BRPM | TEMPERATURE: 98.2 F | DIASTOLIC BLOOD PRESSURE: 81 MMHG | SYSTOLIC BLOOD PRESSURE: 139 MMHG | HEART RATE: 80 BPM | OXYGEN SATURATION: 94 %

## 2022-01-01 VITALS
OXYGEN SATURATION: 96 % | SYSTOLIC BLOOD PRESSURE: 140 MMHG | HEART RATE: 69 BPM | WEIGHT: 230 LBS | DIASTOLIC BLOOD PRESSURE: 83 MMHG | RESPIRATION RATE: 18 BRPM | TEMPERATURE: 98.4 F | BODY MASS INDEX: 33.97 KG/M2

## 2022-01-01 VITALS
TEMPERATURE: 98.6 F | HEART RATE: 108 BPM | OXYGEN SATURATION: 95 % | DIASTOLIC BLOOD PRESSURE: 93 MMHG | SYSTOLIC BLOOD PRESSURE: 170 MMHG | RESPIRATION RATE: 18 BRPM | BODY MASS INDEX: 33.82 KG/M2 | WEIGHT: 229 LBS

## 2022-01-01 VITALS
HEIGHT: 67 IN | BODY MASS INDEX: 38.55 KG/M2 | RESPIRATION RATE: 25 BRPM | TEMPERATURE: 98.9 F | OXYGEN SATURATION: 85 % | DIASTOLIC BLOOD PRESSURE: 42 MMHG | SYSTOLIC BLOOD PRESSURE: 72 MMHG | HEART RATE: 40 BPM | WEIGHT: 245.6 LBS

## 2022-01-01 VITALS
TEMPERATURE: 97.7 F | OXYGEN SATURATION: 96 % | BODY MASS INDEX: 34.7 KG/M2 | SYSTOLIC BLOOD PRESSURE: 148 MMHG | WEIGHT: 235 LBS | DIASTOLIC BLOOD PRESSURE: 93 MMHG | HEART RATE: 73 BPM | RESPIRATION RATE: 18 BRPM

## 2022-01-01 VITALS
SYSTOLIC BLOOD PRESSURE: 155 MMHG | RESPIRATION RATE: 18 BRPM | HEIGHT: 69 IN | TEMPERATURE: 98.4 F | DIASTOLIC BLOOD PRESSURE: 83 MMHG | WEIGHT: 237.2 LBS | BODY MASS INDEX: 35.13 KG/M2 | HEART RATE: 74 BPM | OXYGEN SATURATION: 97 %

## 2022-01-01 VITALS
BODY MASS INDEX: 35 KG/M2 | OXYGEN SATURATION: 96 % | WEIGHT: 237 LBS | TEMPERATURE: 98.2 F | HEART RATE: 67 BPM | SYSTOLIC BLOOD PRESSURE: 134 MMHG | RESPIRATION RATE: 18 BRPM | DIASTOLIC BLOOD PRESSURE: 83 MMHG

## 2022-01-01 VITALS
WEIGHT: 241 LBS | DIASTOLIC BLOOD PRESSURE: 93 MMHG | OXYGEN SATURATION: 97 % | HEART RATE: 80 BPM | RESPIRATION RATE: 18 BRPM | SYSTOLIC BLOOD PRESSURE: 139 MMHG | BODY MASS INDEX: 35.59 KG/M2 | TEMPERATURE: 98 F

## 2022-01-01 VITALS
HEIGHT: 69 IN | HEART RATE: 74 BPM | DIASTOLIC BLOOD PRESSURE: 85 MMHG | TEMPERATURE: 97.3 F | OXYGEN SATURATION: 98 % | RESPIRATION RATE: 18 BRPM | BODY MASS INDEX: 35.44 KG/M2 | SYSTOLIC BLOOD PRESSURE: 133 MMHG

## 2022-01-01 VITALS
DIASTOLIC BLOOD PRESSURE: 85 MMHG | HEART RATE: 105 BPM | HEIGHT: 69 IN | TEMPERATURE: 97.8 F | WEIGHT: 241 LBS | OXYGEN SATURATION: 98 % | RESPIRATION RATE: 18 BRPM | BODY MASS INDEX: 35.7 KG/M2 | SYSTOLIC BLOOD PRESSURE: 131 MMHG

## 2022-01-01 VITALS
RESPIRATION RATE: 18 BRPM | HEART RATE: 74 BPM | SYSTOLIC BLOOD PRESSURE: 134 MMHG | WEIGHT: 225.6 LBS | TEMPERATURE: 97.3 F | DIASTOLIC BLOOD PRESSURE: 70 MMHG | SYSTOLIC BLOOD PRESSURE: 128 MMHG | TEMPERATURE: 98 F | DIASTOLIC BLOOD PRESSURE: 84 MMHG | BODY MASS INDEX: 33.41 KG/M2 | HEIGHT: 69 IN | WEIGHT: 240 LBS | BODY MASS INDEX: 35.55 KG/M2 | OXYGEN SATURATION: 96 % | HEIGHT: 69 IN

## 2022-01-01 VITALS
RESPIRATION RATE: 18 BRPM | SYSTOLIC BLOOD PRESSURE: 143 MMHG | DIASTOLIC BLOOD PRESSURE: 88 MMHG | OXYGEN SATURATION: 97 % | HEART RATE: 74 BPM | TEMPERATURE: 97.1 F

## 2022-01-01 VITALS
OXYGEN SATURATION: 96 % | WEIGHT: 237 LBS | TEMPERATURE: 97.1 F | BODY MASS INDEX: 35.1 KG/M2 | HEIGHT: 69 IN | RESPIRATION RATE: 18 BRPM | SYSTOLIC BLOOD PRESSURE: 133 MMHG | HEART RATE: 73 BPM | DIASTOLIC BLOOD PRESSURE: 87 MMHG

## 2022-01-01 VITALS
OXYGEN SATURATION: 97 % | DIASTOLIC BLOOD PRESSURE: 86 MMHG | BODY MASS INDEX: 33.38 KG/M2 | SYSTOLIC BLOOD PRESSURE: 128 MMHG | TEMPERATURE: 98 F | RESPIRATION RATE: 18 BRPM | HEART RATE: 79 BPM | HEIGHT: 69 IN | WEIGHT: 225.4 LBS

## 2022-01-01 VITALS
SYSTOLIC BLOOD PRESSURE: 135 MMHG | DIASTOLIC BLOOD PRESSURE: 81 MMHG | TEMPERATURE: 98.6 F | RESPIRATION RATE: 18 BRPM | HEART RATE: 70 BPM | OXYGEN SATURATION: 97 %

## 2022-01-01 VITALS
TEMPERATURE: 98.4 F | OXYGEN SATURATION: 97 % | DIASTOLIC BLOOD PRESSURE: 89 MMHG | SYSTOLIC BLOOD PRESSURE: 139 MMHG | HEART RATE: 72 BPM | RESPIRATION RATE: 18 BRPM

## 2022-01-01 VITALS
TEMPERATURE: 98.6 F | BODY MASS INDEX: 33.82 KG/M2 | DIASTOLIC BLOOD PRESSURE: 80 MMHG | RESPIRATION RATE: 18 BRPM | WEIGHT: 229 LBS | SYSTOLIC BLOOD PRESSURE: 133 MMHG | OXYGEN SATURATION: 95 % | HEART RATE: 74 BPM

## 2022-01-01 VITALS
SYSTOLIC BLOOD PRESSURE: 135 MMHG | BODY MASS INDEX: 35.25 KG/M2 | WEIGHT: 238 LBS | DIASTOLIC BLOOD PRESSURE: 81 MMHG | HEIGHT: 69 IN

## 2022-01-01 VITALS
SYSTOLIC BLOOD PRESSURE: 138 MMHG | DIASTOLIC BLOOD PRESSURE: 81 MMHG | OXYGEN SATURATION: 98 % | HEART RATE: 76 BPM | RESPIRATION RATE: 18 BRPM | TEMPERATURE: 98 F

## 2022-01-01 DIAGNOSIS — G93.89 BRAIN MASS: ICD-10-CM

## 2022-01-01 DIAGNOSIS — G93.89 BRAIN MASS: Primary | ICD-10-CM

## 2022-01-01 DIAGNOSIS — C71.9 GLIOMA: Primary | ICD-10-CM

## 2022-01-01 DIAGNOSIS — M62.82 NON-TRAUMATIC RHABDOMYOLYSIS: ICD-10-CM

## 2022-01-01 DIAGNOSIS — C71.9 GLIOMA: ICD-10-CM

## 2022-01-01 DIAGNOSIS — R41.0 CONFUSION: ICD-10-CM

## 2022-01-01 DIAGNOSIS — Z92.3 HISTORY OF RADIATION THERAPY: ICD-10-CM

## 2022-01-01 DIAGNOSIS — J96.01 ACUTE RESPIRATORY FAILURE WITH HYPOXIA: Primary | ICD-10-CM

## 2022-01-01 DIAGNOSIS — C71.9 GLIOBLASTOMA: Primary | ICD-10-CM

## 2022-01-01 DIAGNOSIS — J10.1 INFLUENZA A: ICD-10-CM

## 2022-01-01 DIAGNOSIS — C71.9 GLIOBLASTOMA: ICD-10-CM

## 2022-01-01 LAB
A-A DO2: 33 MMHG (ref 0–300)
A-A DO2: 47.2 MMHG (ref 0–300)
ALBUMIN SERPL-MCNC: 3.32 G/DL (ref 3.5–5.2)
ALBUMIN SERPL-MCNC: 3.54 G/DL (ref 3.5–5.2)
ALBUMIN SERPL-MCNC: 3.87 G/DL (ref 3.5–5.2)
ALBUMIN SERPL-MCNC: 3.94 G/DL (ref 3.5–5.2)
ALBUMIN SERPL-MCNC: 4.06 G/DL (ref 3.5–5.2)
ALBUMIN SERPL-MCNC: 4.13 G/DL (ref 3.5–5.2)
ALBUMIN SERPL-MCNC: 4.18 G/DL (ref 3.5–5.2)
ALBUMIN SERPL-MCNC: 4.34 G/DL (ref 3.5–5.2)
ALBUMIN SERPL-MCNC: 4.35 G/DL (ref 3.5–5.2)
ALBUMIN SERPL-MCNC: 4.39 G/DL (ref 3.5–5.2)
ALBUMIN SERPL-MCNC: 4.49 G/DL (ref 3.5–5.2)
ALBUMIN SERPL-MCNC: 4.57 G/DL (ref 3.5–5.2)
ALBUMIN/GLOB SERPL: 1.3 G/DL
ALBUMIN/GLOB SERPL: 1.4 G/DL
ALBUMIN/GLOB SERPL: 1.5 G/DL
ALBUMIN/GLOB SERPL: 1.6 G/DL
ALBUMIN/GLOB SERPL: 1.7 G/DL
ALBUMIN/GLOB SERPL: 1.8 G/DL
ALBUMIN/GLOB SERPL: 1.9 G/DL
ALP SERPL-CCNC: 100 U/L (ref 39–117)
ALP SERPL-CCNC: 101 U/L (ref 39–117)
ALP SERPL-CCNC: 102 U/L (ref 39–117)
ALP SERPL-CCNC: 105 U/L (ref 39–117)
ALP SERPL-CCNC: 105 U/L (ref 39–117)
ALP SERPL-CCNC: 54 U/L (ref 39–117)
ALP SERPL-CCNC: 60 U/L (ref 39–117)
ALP SERPL-CCNC: 60 U/L (ref 39–117)
ALP SERPL-CCNC: 67 U/L (ref 39–117)
ALP SERPL-CCNC: 87 U/L (ref 39–117)
ALP SERPL-CCNC: 94 U/L (ref 39–117)
ALP SERPL-CCNC: 99 U/L (ref 39–117)
ALT SERPL W P-5'-P-CCNC: 118 U/L (ref 1–41)
ALT SERPL W P-5'-P-CCNC: 19 U/L (ref 1–41)
ALT SERPL W P-5'-P-CCNC: 20 U/L (ref 1–41)
ALT SERPL W P-5'-P-CCNC: 24 U/L (ref 1–41)
ALT SERPL W P-5'-P-CCNC: 24 U/L (ref 1–41)
ALT SERPL W P-5'-P-CCNC: 25 U/L (ref 1–41)
ALT SERPL W P-5'-P-CCNC: 26 U/L (ref 1–41)
ALT SERPL W P-5'-P-CCNC: 27 U/L (ref 1–41)
ALT SERPL W P-5'-P-CCNC: 32 U/L (ref 1–41)
ALT SERPL W P-5'-P-CCNC: 33 U/L (ref 1–41)
ALT SERPL W P-5'-P-CCNC: 72 U/L (ref 1–41)
ALT SERPL W P-5'-P-CCNC: 72 U/L (ref 1–41)
ALT SERPL W P-5'-P-CCNC: 83 U/L (ref 1–41)
ALT SERPL W P-5'-P-CCNC: 85 U/L (ref 1–41)
AMMONIA BLD-SCNC: 54 UMOL/L (ref 16–60)
AMPHET+METHAMPHET UR QL: NEGATIVE
AMPHETAMINES UR QL: NEGATIVE
ANION GAP SERPL CALCULATED.3IONS-SCNC: 10 MMOL/L (ref 5–15)
ANION GAP SERPL CALCULATED.3IONS-SCNC: 10 MMOL/L (ref 5–15)
ANION GAP SERPL CALCULATED.3IONS-SCNC: 10.1 MMOL/L (ref 5–15)
ANION GAP SERPL CALCULATED.3IONS-SCNC: 10.1 MMOL/L (ref 5–15)
ANION GAP SERPL CALCULATED.3IONS-SCNC: 10.8 MMOL/L (ref 5–15)
ANION GAP SERPL CALCULATED.3IONS-SCNC: 13.7 MMOL/L (ref 5–15)
ANION GAP SERPL CALCULATED.3IONS-SCNC: 7.2 MMOL/L (ref 5–15)
ANION GAP SERPL CALCULATED.3IONS-SCNC: 7.6 MMOL/L (ref 5–15)
ANION GAP SERPL CALCULATED.3IONS-SCNC: 7.7 MMOL/L (ref 5–15)
ANION GAP SERPL CALCULATED.3IONS-SCNC: 8.7 MMOL/L (ref 5–15)
ANION GAP SERPL CALCULATED.3IONS-SCNC: 8.7 MMOL/L (ref 5–15)
ANION GAP SERPL CALCULATED.3IONS-SCNC: 8.8 MMOL/L (ref 5–15)
ANION GAP SERPL CALCULATED.3IONS-SCNC: 8.9 MMOL/L (ref 5–15)
ANION GAP SERPL CALCULATED.3IONS-SCNC: 9.2 MMOL/L (ref 5–15)
ANION GAP SERPL CALCULATED.3IONS-SCNC: 9.9 MMOL/L (ref 5–15)
ARTERIAL PATENCY WRIST A: POSITIVE
ARTERIAL PATENCY WRIST A: POSITIVE
AST SERPL-CCNC: 17 U/L (ref 1–40)
AST SERPL-CCNC: 17 U/L (ref 1–40)
AST SERPL-CCNC: 19 U/L (ref 1–40)
AST SERPL-CCNC: 20 U/L (ref 1–40)
AST SERPL-CCNC: 21 U/L (ref 1–40)
AST SERPL-CCNC: 21 U/L (ref 1–40)
AST SERPL-CCNC: 23 U/L (ref 1–40)
AST SERPL-CCNC: 25 U/L (ref 1–40)
AST SERPL-CCNC: 28 U/L (ref 1–40)
AST SERPL-CCNC: 30 U/L (ref 1–40)
ATMOSPHERIC PRESS: 728 MMHG
ATMOSPHERIC PRESS: 729 MMHG
BARBITURATES UR QL SCN: NEGATIVE
BASE EXCESS BLDA CALC-SCNC: 1.9 MMOL/L (ref 0–2)
BASE EXCESS BLDA CALC-SCNC: 2.7 MMOL/L (ref 0–2)
BASOPHILS # BLD AUTO: 0.01 10*3/MM3 (ref 0–0.2)
BASOPHILS # BLD AUTO: 0.01 10*3/MM3 (ref 0–0.2)
BASOPHILS # BLD AUTO: 0.02 10*3/MM3 (ref 0–0.2)
BASOPHILS # BLD AUTO: 0.03 10*3/MM3 (ref 0–0.2)
BASOPHILS # BLD AUTO: 0.04 10*3/MM3 (ref 0–0.2)
BASOPHILS # BLD AUTO: 0.05 10*3/MM3 (ref 0–0.2)
BASOPHILS # BLD AUTO: 0.06 10*3/MM3 (ref 0–0.2)
BASOPHILS NFR BLD AUTO: 0.1 % (ref 0–1.5)
BASOPHILS NFR BLD AUTO: 0.5 % (ref 0–1.5)
BASOPHILS NFR BLD AUTO: 0.6 % (ref 0–1.5)
BASOPHILS NFR BLD AUTO: 0.7 % (ref 0–1.5)
BASOPHILS NFR BLD AUTO: 0.8 % (ref 0–1.5)
BASOPHILS NFR BLD AUTO: 0.8 % (ref 0–1.5)
BDY SITE: ABNORMAL
BDY SITE: ABNORMAL
BENZODIAZ UR QL SCN: POSITIVE
BILIRUB SERPL-MCNC: 0.2 MG/DL (ref 0–1.2)
BILIRUB SERPL-MCNC: 0.3 MG/DL (ref 0–1.2)
BILIRUB SERPL-MCNC: 0.4 MG/DL (ref 0–1.2)
BILIRUB SERPL-MCNC: 0.5 MG/DL (ref 0–1.2)
BILIRUB SERPL-MCNC: 0.6 MG/DL (ref 0–1.2)
BILIRUB SERPL-MCNC: 0.6 MG/DL (ref 0–1.2)
BILIRUB SERPL-MCNC: 0.7 MG/DL (ref 0–1.2)
BILIRUB UR QL STRIP: NEGATIVE
BODY TEMPERATURE: 0 C
BODY TEMPERATURE: 0 C
BUN SERPL-MCNC: 10 MG/DL (ref 6–20)
BUN SERPL-MCNC: 14 MG/DL (ref 6–20)
BUN SERPL-MCNC: 14 MG/DL (ref 6–20)
BUN SERPL-MCNC: 15 MG/DL (ref 6–20)
BUN SERPL-MCNC: 16 MG/DL (ref 6–20)
BUN SERPL-MCNC: 16 MG/DL (ref 6–20)
BUN SERPL-MCNC: 19 MG/DL (ref 6–20)
BUN SERPL-MCNC: 5 MG/DL (ref 6–20)
BUN SERPL-MCNC: 6 MG/DL (ref 6–20)
BUN SERPL-MCNC: 6 MG/DL (ref 6–20)
BUN SERPL-MCNC: 7 MG/DL (ref 6–20)
BUN SERPL-MCNC: 7 MG/DL (ref 6–20)
BUN SERPL-MCNC: 8 MG/DL (ref 6–20)
BUN/CREAT SERPL: 10.5 (ref 7–25)
BUN/CREAT SERPL: 17.5 (ref 7–25)
BUN/CREAT SERPL: 18.8 (ref 7–25)
BUN/CREAT SERPL: 20.3 (ref 7–25)
BUN/CREAT SERPL: 21.6 (ref 7–25)
BUN/CREAT SERPL: 22.5 (ref 7–25)
BUN/CREAT SERPL: 28.8 (ref 7–25)
BUN/CREAT SERPL: 5.7 (ref 7–25)
BUN/CREAT SERPL: 6.7 (ref 7–25)
BUN/CREAT SERPL: 7 (ref 7–25)
BUN/CREAT SERPL: 7.7 (ref 7–25)
BUN/CREAT SERPL: 8.4 (ref 7–25)
BUN/CREAT SERPL: 8.8 (ref 7–25)
BUN/CREAT SERPL: 8.9 (ref 7–25)
BUN/CREAT SERPL: 9.1 (ref 7–25)
BUPRENORPHINE SERPL-MCNC: NEGATIVE NG/ML
CALCIUM SPEC-SCNC: 8.3 MG/DL (ref 8.6–10.5)
CALCIUM SPEC-SCNC: 8.4 MG/DL (ref 8.6–10.5)
CALCIUM SPEC-SCNC: 8.4 MG/DL (ref 8.6–10.5)
CALCIUM SPEC-SCNC: 8.8 MG/DL (ref 8.6–10.5)
CALCIUM SPEC-SCNC: 9 MG/DL (ref 8.6–10.5)
CALCIUM SPEC-SCNC: 9 MG/DL (ref 8.6–10.5)
CALCIUM SPEC-SCNC: 9.1 MG/DL (ref 8.6–10.5)
CALCIUM SPEC-SCNC: 9.2 MG/DL (ref 8.6–10.5)
CALCIUM SPEC-SCNC: 9.2 MG/DL (ref 8.6–10.5)
CALCIUM SPEC-SCNC: 9.3 MG/DL (ref 8.6–10.5)
CALCIUM SPEC-SCNC: 9.4 MG/DL (ref 8.6–10.5)
CALCIUM SPEC-SCNC: 9.5 MG/DL (ref 8.6–10.5)
CALCIUM SPEC-SCNC: 9.5 MG/DL (ref 8.6–10.5)
CANNABINOIDS SERPL QL: NEGATIVE
CHLORIDE SERPL-SCNC: 100 MMOL/L (ref 98–107)
CHLORIDE SERPL-SCNC: 100 MMOL/L (ref 98–107)
CHLORIDE SERPL-SCNC: 101 MMOL/L (ref 98–107)
CHLORIDE SERPL-SCNC: 101 MMOL/L (ref 98–107)
CHLORIDE SERPL-SCNC: 103 MMOL/L (ref 98–107)
CHLORIDE SERPL-SCNC: 94 MMOL/L (ref 98–107)
CHLORIDE SERPL-SCNC: 94 MMOL/L (ref 98–107)
CHLORIDE SERPL-SCNC: 96 MMOL/L (ref 98–107)
CHLORIDE SERPL-SCNC: 96 MMOL/L (ref 98–107)
CHLORIDE SERPL-SCNC: 98 MMOL/L (ref 98–107)
CHLORIDE SERPL-SCNC: 99 MMOL/L (ref 98–107)
CK SERPL-CCNC: 388 U/L (ref 20–200)
CLARITY UR: ABNORMAL
CLARITY UR: CLEAR
CO2 BLDA-SCNC: 30 MMOL/L (ref 22–33)
CO2 BLDA-SCNC: 30.1 MMOL/L (ref 22–33)
CO2 SERPL-SCNC: 23.3 MMOL/L (ref 22–29)
CO2 SERPL-SCNC: 24.3 MMOL/L (ref 22–29)
CO2 SERPL-SCNC: 24.3 MMOL/L (ref 22–29)
CO2 SERPL-SCNC: 24.9 MMOL/L (ref 22–29)
CO2 SERPL-SCNC: 24.9 MMOL/L (ref 22–29)
CO2 SERPL-SCNC: 25 MMOL/L (ref 22–29)
CO2 SERPL-SCNC: 26 MMOL/L (ref 22–29)
CO2 SERPL-SCNC: 26.2 MMOL/L (ref 22–29)
CO2 SERPL-SCNC: 26.2 MMOL/L (ref 22–29)
CO2 SERPL-SCNC: 26.8 MMOL/L (ref 22–29)
CO2 SERPL-SCNC: 27.1 MMOL/L (ref 22–29)
CO2 SERPL-SCNC: 27.8 MMOL/L (ref 22–29)
CO2 SERPL-SCNC: 28.1 MMOL/L (ref 22–29)
CO2 SERPL-SCNC: 28.3 MMOL/L (ref 22–29)
CO2 SERPL-SCNC: 28.4 MMOL/L (ref 22–29)
COCAINE UR QL: NEGATIVE
COHGB MFR BLD: 1.4 % (ref 0–5)
COHGB MFR BLD: 1.5 % (ref 0–5)
COLOR UR: YELLOW
CREAT SERPL-MCNC: 0.66 MG/DL (ref 0.76–1.27)
CREAT SERPL-MCNC: 0.69 MG/DL (ref 0.76–1.27)
CREAT SERPL-MCNC: 0.71 MG/DL (ref 0.76–1.27)
CREAT SERPL-MCNC: 0.74 MG/DL (ref 0.76–1.27)
CREAT SERPL-MCNC: 0.8 MG/DL (ref 0.76–1.27)
CREAT SERPL-MCNC: 0.8 MG/DL (ref 0.76–1.27)
CREAT SERPL-MCNC: 0.83 MG/DL (ref 0.76–1.27)
CREAT SERPL-MCNC: 0.86 MG/DL (ref 0.76–1.27)
CREAT SERPL-MCNC: 0.87 MG/DL (ref 0.76–1.27)
CREAT SERPL-MCNC: 0.88 MG/DL (ref 0.76–1.27)
CREAT SERPL-MCNC: 0.89 MG/DL (ref 0.76–1.27)
CREAT SERPL-MCNC: 0.9 MG/DL (ref 0.76–1.27)
CREAT SERPL-MCNC: 0.91 MG/DL (ref 0.76–1.27)
CREAT SERPL-MCNC: 0.91 MG/DL (ref 0.76–1.27)
CREAT SERPL-MCNC: 0.95 MG/DL (ref 0.76–1.27)
DEPRECATED RDW RBC AUTO: 40.4 FL (ref 37–54)
DEPRECATED RDW RBC AUTO: 40.8 FL (ref 37–54)
DEPRECATED RDW RBC AUTO: 40.9 FL (ref 37–54)
DEPRECATED RDW RBC AUTO: 41.3 FL (ref 37–54)
DEPRECATED RDW RBC AUTO: 42 FL (ref 37–54)
DEPRECATED RDW RBC AUTO: 42.7 FL (ref 37–54)
DEPRECATED RDW RBC AUTO: 42.9 FL (ref 37–54)
DEPRECATED RDW RBC AUTO: 43.7 FL (ref 37–54)
DEPRECATED RDW RBC AUTO: 43.8 FL (ref 37–54)
DEPRECATED RDW RBC AUTO: 43.9 FL (ref 37–54)
DEPRECATED RDW RBC AUTO: 44.5 FL (ref 37–54)
DEPRECATED RDW RBC AUTO: 45.1 FL (ref 37–54)
DEPRECATED RDW RBC AUTO: 46.5 FL (ref 37–54)
DEPRECATED RDW RBC AUTO: 46.5 FL (ref 37–54)
DEPRECATED RDW RBC AUTO: 48.2 FL (ref 37–54)
EGFRCR SERPLBLD CKD-EPI 2021: 100.9 ML/MIN/1.73
EGFRCR SERPLBLD CKD-EPI 2021: 100.9 ML/MIN/1.73
EGFRCR SERPLBLD CKD-EPI 2021: 101.3 ML/MIN/1.73
EGFRCR SERPLBLD CKD-EPI 2021: 102.3 ML/MIN/1.73
EGFRCR SERPLBLD CKD-EPI 2021: 102.7 ML/MIN/1.73
EGFRCR SERPLBLD CKD-EPI 2021: 103.9 ML/MIN/1.73
EGFRCR SERPLBLD CKD-EPI 2021: 103.9 ML/MIN/1.73
EGFRCR SERPLBLD CKD-EPI 2021: 106.3 ML/MIN/1.73
EGFRCR SERPLBLD CKD-EPI 2021: 107.7 ML/MIN/1.73
EGFRCR SERPLBLD CKD-EPI 2021: 108.6 ML/MIN/1.73
EGFRCR SERPLBLD CKD-EPI 2021: 110.1 ML/MIN/1.73
EGFRCR SERPLBLD CKD-EPI 2021: 98.9 ML/MIN/1.73
EGFRCR SERPLBLD CKD-EPI 2021: 98.9 ML/MIN/1.73
EOSINOPHIL # BLD AUTO: 0 10*3/MM3 (ref 0–0.4)
EOSINOPHIL # BLD AUTO: 0.01 10*3/MM3 (ref 0–0.4)
EOSINOPHIL # BLD AUTO: 0.01 10*3/MM3 (ref 0–0.4)
EOSINOPHIL # BLD AUTO: 0.12 10*3/MM3 (ref 0–0.4)
EOSINOPHIL # BLD AUTO: 0.18 10*3/MM3 (ref 0–0.4)
EOSINOPHIL # BLD AUTO: 0.18 10*3/MM3 (ref 0–0.4)
EOSINOPHIL # BLD AUTO: 0.19 10*3/MM3 (ref 0–0.4)
EOSINOPHIL # BLD AUTO: 0.19 10*3/MM3 (ref 0–0.4)
EOSINOPHIL # BLD AUTO: 0.22 10*3/MM3 (ref 0–0.4)
EOSINOPHIL # BLD AUTO: 0.23 10*3/MM3 (ref 0–0.4)
EOSINOPHIL # BLD AUTO: 0.25 10*3/MM3 (ref 0–0.4)
EOSINOPHIL # BLD AUTO: 0.32 10*3/MM3 (ref 0–0.4)
EOSINOPHIL # BLD MANUAL: 0.12 10*3/MM3 (ref 0–0.4)
EOSINOPHIL NFR BLD AUTO: 0 % (ref 0.3–6.2)
EOSINOPHIL NFR BLD AUTO: 0.1 % (ref 0.3–6.2)
EOSINOPHIL NFR BLD AUTO: 0.1 % (ref 0.3–6.2)
EOSINOPHIL NFR BLD AUTO: 1.8 % (ref 0.3–6.2)
EOSINOPHIL NFR BLD AUTO: 2.4 % (ref 0.3–6.2)
EOSINOPHIL NFR BLD AUTO: 2.6 % (ref 0.3–6.2)
EOSINOPHIL NFR BLD AUTO: 2.6 % (ref 0.3–6.2)
EOSINOPHIL NFR BLD AUTO: 2.7 % (ref 0.3–6.2)
EOSINOPHIL NFR BLD AUTO: 3.2 % (ref 0.3–6.2)
EOSINOPHIL NFR BLD AUTO: 3.7 % (ref 0.3–6.2)
EOSINOPHIL NFR BLD AUTO: 3.8 % (ref 0.3–6.2)
EOSINOPHIL NFR BLD AUTO: 4.4 % (ref 0.3–6.2)
EOSINOPHIL NFR BLD MANUAL: 1 % (ref 0.3–6.2)
ERYTHROCYTE [DISTWIDTH] IN BLOOD BY AUTOMATED COUNT: 12 % (ref 12.3–15.4)
ERYTHROCYTE [DISTWIDTH] IN BLOOD BY AUTOMATED COUNT: 12.2 % (ref 12.3–15.4)
ERYTHROCYTE [DISTWIDTH] IN BLOOD BY AUTOMATED COUNT: 12.2 % (ref 12.3–15.4)
ERYTHROCYTE [DISTWIDTH] IN BLOOD BY AUTOMATED COUNT: 12.3 % (ref 12.3–15.4)
ERYTHROCYTE [DISTWIDTH] IN BLOOD BY AUTOMATED COUNT: 12.3 % (ref 12.3–15.4)
ERYTHROCYTE [DISTWIDTH] IN BLOOD BY AUTOMATED COUNT: 12.4 % (ref 12.3–15.4)
ERYTHROCYTE [DISTWIDTH] IN BLOOD BY AUTOMATED COUNT: 12.6 % (ref 12.3–15.4)
ERYTHROCYTE [DISTWIDTH] IN BLOOD BY AUTOMATED COUNT: 12.7 % (ref 12.3–15.4)
ERYTHROCYTE [DISTWIDTH] IN BLOOD BY AUTOMATED COUNT: 12.7 % (ref 12.3–15.4)
ERYTHROCYTE [DISTWIDTH] IN BLOOD BY AUTOMATED COUNT: 13 % (ref 12.3–15.4)
ERYTHROCYTE [DISTWIDTH] IN BLOOD BY AUTOMATED COUNT: 13.2 % (ref 12.3–15.4)
ERYTHROCYTE [DISTWIDTH] IN BLOOD BY AUTOMATED COUNT: 13.7 % (ref 12.3–15.4)
ERYTHROCYTE [DISTWIDTH] IN BLOOD BY AUTOMATED COUNT: 14.2 % (ref 12.3–15.4)
ETHANOL BLD-MCNC: <10 MG/DL (ref 0–10)
ETHANOL UR QL: <0.01 %
FLUAV RNA RESP QL NAA+PROBE: DETECTED
FLUBV RNA RESP QL NAA+PROBE: NOT DETECTED
GAS FLOW AIRWAY: 2 LPM
GFR SERPL CREATININE-BSD FRML MDRD: 82 ML/MIN/1.73
GFR SERPL CREATININE-BSD FRML MDRD: 89 ML/MIN/1.73
GLOBULIN UR ELPH-MCNC: 2.1 GM/DL
GLOBULIN UR ELPH-MCNC: 2.2 GM/DL
GLOBULIN UR ELPH-MCNC: 2.5 GM/DL
GLOBULIN UR ELPH-MCNC: 2.6 GM/DL
GLOBULIN UR ELPH-MCNC: 2.6 GM/DL
GLOBULIN UR ELPH-MCNC: 2.7 GM/DL
GLOBULIN UR ELPH-MCNC: 2.8 GM/DL
GLOBULIN UR ELPH-MCNC: 2.9 GM/DL
GLOBULIN UR ELPH-MCNC: 3.1 GM/DL
GLUCOSE SERPL-MCNC: 111 MG/DL (ref 65–99)
GLUCOSE SERPL-MCNC: 111 MG/DL (ref 65–99)
GLUCOSE SERPL-MCNC: 114 MG/DL (ref 65–99)
GLUCOSE SERPL-MCNC: 116 MG/DL (ref 65–99)
GLUCOSE SERPL-MCNC: 117 MG/DL (ref 65–99)
GLUCOSE SERPL-MCNC: 118 MG/DL (ref 65–99)
GLUCOSE SERPL-MCNC: 121 MG/DL (ref 65–99)
GLUCOSE SERPL-MCNC: 127 MG/DL (ref 65–99)
GLUCOSE SERPL-MCNC: 131 MG/DL (ref 65–99)
GLUCOSE SERPL-MCNC: 131 MG/DL (ref 65–99)
GLUCOSE SERPL-MCNC: 138 MG/DL (ref 65–99)
GLUCOSE SERPL-MCNC: 143 MG/DL (ref 65–99)
GLUCOSE SERPL-MCNC: 144 MG/DL (ref 65–99)
GLUCOSE SERPL-MCNC: 211 MG/DL (ref 65–99)
GLUCOSE SERPL-MCNC: 90 MG/DL (ref 65–99)
GLUCOSE UR STRIP-MCNC: ABNORMAL MG/DL
GLUCOSE UR STRIP-MCNC: NEGATIVE MG/DL
HCO3 BLDA-SCNC: 28.4 MMOL/L (ref 20–26)
HCO3 BLDA-SCNC: 28.6 MMOL/L (ref 20–26)
HCT VFR BLD AUTO: 33 % (ref 37.5–51)
HCT VFR BLD AUTO: 38.4 % (ref 37.5–51)
HCT VFR BLD AUTO: 42.1 % (ref 37.5–51)
HCT VFR BLD AUTO: 42.2 % (ref 37.5–51)
HCT VFR BLD AUTO: 42.4 % (ref 37.5–51)
HCT VFR BLD AUTO: 42.7 % (ref 37.5–51)
HCT VFR BLD AUTO: 42.9 % (ref 37.5–51)
HCT VFR BLD AUTO: 43.1 % (ref 37.5–51)
HCT VFR BLD AUTO: 43.3 % (ref 37.5–51)
HCT VFR BLD AUTO: 43.6 % (ref 37.5–51)
HCT VFR BLD AUTO: 43.8 % (ref 37.5–51)
HCT VFR BLD AUTO: 44.1 % (ref 37.5–51)
HCT VFR BLD AUTO: 44.7 % (ref 37.5–51)
HCT VFR BLD AUTO: 45 % (ref 37.5–51)
HCT VFR BLD AUTO: 47.8 % (ref 37.5–51)
HCT VFR BLD CALC: 35.6 % (ref 38–51)
HCT VFR BLD CALC: 36.5 % (ref 38–51)
HGB BLD-MCNC: 11.4 G/DL (ref 13–17.7)
HGB BLD-MCNC: 13.4 G/DL (ref 13–17.7)
HGB BLD-MCNC: 14.4 G/DL (ref 13–17.7)
HGB BLD-MCNC: 14.5 G/DL (ref 13–17.7)
HGB BLD-MCNC: 14.7 G/DL (ref 13–17.7)
HGB BLD-MCNC: 14.7 G/DL (ref 13–17.7)
HGB BLD-MCNC: 14.8 G/DL (ref 13–17.7)
HGB BLD-MCNC: 14.8 G/DL (ref 13–17.7)
HGB BLD-MCNC: 14.9 G/DL (ref 13–17.7)
HGB BLD-MCNC: 15 G/DL (ref 13–17.7)
HGB BLD-MCNC: 15 G/DL (ref 13–17.7)
HGB BLD-MCNC: 15.1 G/DL (ref 13–17.7)
HGB BLD-MCNC: 15.1 G/DL (ref 13–17.7)
HGB BLD-MCNC: 15.3 G/DL (ref 13–17.7)
HGB BLD-MCNC: 16.5 G/DL (ref 13–17.7)
HGB BLDA-MCNC: 11.6 G/DL (ref 14–18)
HGB BLDA-MCNC: 11.9 G/DL (ref 14–18)
HGB UR QL STRIP.AUTO: NEGATIVE
IMM GRANULOCYTES # BLD AUTO: 0.01 10*3/MM3 (ref 0–0.05)
IMM GRANULOCYTES # BLD AUTO: 0.02 10*3/MM3 (ref 0–0.05)
IMM GRANULOCYTES # BLD AUTO: 0.03 10*3/MM3 (ref 0–0.05)
IMM GRANULOCYTES # BLD AUTO: 0.08 10*3/MM3 (ref 0–0.05)
IMM GRANULOCYTES # BLD AUTO: 0.13 10*3/MM3 (ref 0–0.05)
IMM GRANULOCYTES # BLD AUTO: 0.27 10*3/MM3 (ref 0–0.05)
IMM GRANULOCYTES NFR BLD AUTO: 0.1 % (ref 0–0.5)
IMM GRANULOCYTES NFR BLD AUTO: 0.2 % (ref 0–0.5)
IMM GRANULOCYTES NFR BLD AUTO: 0.3 % (ref 0–0.5)
IMM GRANULOCYTES NFR BLD AUTO: 0.4 % (ref 0–0.5)
IMM GRANULOCYTES NFR BLD AUTO: 0.7 % (ref 0–0.5)
IMM GRANULOCYTES NFR BLD AUTO: 1.6 % (ref 0–0.5)
IMM GRANULOCYTES NFR BLD AUTO: 1.9 % (ref 0–0.5)
INHALED O2 CONCENTRATION: 21 %
INHALED O2 CONCENTRATION: 28 %
KETONES UR QL STRIP: NEGATIVE
LEUKOCYTE ESTERASE UR QL STRIP.AUTO: NEGATIVE
LYMPHOCYTES # BLD AUTO: 0.61 10*3/MM3 (ref 0.7–3.1)
LYMPHOCYTES # BLD AUTO: 0.79 10*3/MM3 (ref 0.7–3.1)
LYMPHOCYTES # BLD AUTO: 0.96 10*3/MM3 (ref 0.7–3.1)
LYMPHOCYTES # BLD AUTO: 1.33 10*3/MM3 (ref 0.7–3.1)
LYMPHOCYTES # BLD AUTO: 1.48 10*3/MM3 (ref 0.7–3.1)
LYMPHOCYTES # BLD AUTO: 1.72 10*3/MM3 (ref 0.7–3.1)
LYMPHOCYTES # BLD AUTO: 1.73 10*3/MM3 (ref 0.7–3.1)
LYMPHOCYTES # BLD AUTO: 1.81 10*3/MM3 (ref 0.7–3.1)
LYMPHOCYTES # BLD AUTO: 1.82 10*3/MM3 (ref 0.7–3.1)
LYMPHOCYTES # BLD AUTO: 1.87 10*3/MM3 (ref 0.7–3.1)
LYMPHOCYTES # BLD AUTO: 1.99 10*3/MM3 (ref 0.7–3.1)
LYMPHOCYTES # BLD AUTO: 2.06 10*3/MM3 (ref 0.7–3.1)
LYMPHOCYTES # BLD MANUAL: 1.52 10*3/MM3 (ref 0.7–3.1)
LYMPHOCYTES # BLD MANUAL: 1.74 10*3/MM3 (ref 0.7–3.1)
LYMPHOCYTES # BLD MANUAL: 2.56 10*3/MM3 (ref 0.7–3.1)
LYMPHOCYTES NFR BLD AUTO: 20.2 % (ref 19.6–45.3)
LYMPHOCYTES NFR BLD AUTO: 21.8 % (ref 19.6–45.3)
LYMPHOCYTES NFR BLD AUTO: 24.4 % (ref 19.6–45.3)
LYMPHOCYTES NFR BLD AUTO: 24.7 % (ref 19.6–45.3)
LYMPHOCYTES NFR BLD AUTO: 24.9 % (ref 19.6–45.3)
LYMPHOCYTES NFR BLD AUTO: 26.8 % (ref 19.6–45.3)
LYMPHOCYTES NFR BLD AUTO: 27.1 % (ref 19.6–45.3)
LYMPHOCYTES NFR BLD AUTO: 28.5 % (ref 19.6–45.3)
LYMPHOCYTES NFR BLD AUTO: 30 % (ref 19.6–45.3)
LYMPHOCYTES NFR BLD AUTO: 5.3 % (ref 19.6–45.3)
LYMPHOCYTES NFR BLD AUTO: 6.9 % (ref 19.6–45.3)
LYMPHOCYTES NFR BLD AUTO: 9.5 % (ref 19.6–45.3)
LYMPHOCYTES NFR BLD MANUAL: 3 % (ref 5–12)
LYMPHOCYTES NFR BLD MANUAL: 4 % (ref 5–12)
LYMPHOCYTES NFR BLD MANUAL: 8 % (ref 5–12)
Lab: ABNORMAL
Lab: ABNORMAL
MCH RBC QN AUTO: 30.8 PG (ref 26.6–33)
MCH RBC QN AUTO: 30.9 PG (ref 26.6–33)
MCH RBC QN AUTO: 31.4 PG (ref 26.6–33)
MCH RBC QN AUTO: 31.5 PG (ref 26.6–33)
MCH RBC QN AUTO: 31.8 PG (ref 26.6–33)
MCH RBC QN AUTO: 31.8 PG (ref 26.6–33)
MCH RBC QN AUTO: 31.9 PG (ref 26.6–33)
MCH RBC QN AUTO: 31.9 PG (ref 26.6–33)
MCH RBC QN AUTO: 32 PG (ref 26.6–33)
MCH RBC QN AUTO: 32.1 PG (ref 26.6–33)
MCH RBC QN AUTO: 32.3 PG (ref 26.6–33)
MCHC RBC AUTO-ENTMCNC: 33.6 G/DL (ref 31.5–35.7)
MCHC RBC AUTO-ENTMCNC: 34 G/DL (ref 31.5–35.7)
MCHC RBC AUTO-ENTMCNC: 34 G/DL (ref 31.5–35.7)
MCHC RBC AUTO-ENTMCNC: 34.1 G/DL (ref 31.5–35.7)
MCHC RBC AUTO-ENTMCNC: 34.2 G/DL (ref 31.5–35.7)
MCHC RBC AUTO-ENTMCNC: 34.4 G/DL (ref 31.5–35.7)
MCHC RBC AUTO-ENTMCNC: 34.5 G/DL (ref 31.5–35.7)
MCHC RBC AUTO-ENTMCNC: 34.6 G/DL (ref 31.5–35.7)
MCHC RBC AUTO-ENTMCNC: 34.7 G/DL (ref 31.5–35.7)
MCHC RBC AUTO-ENTMCNC: 34.9 G/DL (ref 31.5–35.7)
MCHC RBC AUTO-ENTMCNC: 35.1 G/DL (ref 31.5–35.7)
MCV RBC AUTO: 90.7 FL (ref 79–97)
MCV RBC AUTO: 90.7 FL (ref 79–97)
MCV RBC AUTO: 91.1 FL (ref 79–97)
MCV RBC AUTO: 91.1 FL (ref 79–97)
MCV RBC AUTO: 91.9 FL (ref 79–97)
MCV RBC AUTO: 91.9 FL (ref 79–97)
MCV RBC AUTO: 92 FL (ref 79–97)
MCV RBC AUTO: 92 FL (ref 79–97)
MCV RBC AUTO: 92.4 FL (ref 79–97)
MCV RBC AUTO: 92.6 FL (ref 79–97)
MCV RBC AUTO: 93 FL (ref 79–97)
MCV RBC AUTO: 93 FL (ref 79–97)
MCV RBC AUTO: 93.1 FL (ref 79–97)
MCV RBC AUTO: 93.2 FL (ref 79–97)
MCV RBC AUTO: 94.7 FL (ref 79–97)
METAMYELOCYTES NFR BLD MANUAL: 1 % (ref 0–0)
METAMYELOCYTES NFR BLD MANUAL: 4 % (ref 0–0)
METAMYELOCYTES NFR BLD MANUAL: 6 % (ref 0–0)
METHADONE UR QL SCN: NEGATIVE
METHGB BLD QL: 0.5 % (ref 0–3)
METHGB BLD QL: 0.7 % (ref 0–3)
MODALITY: ABNORMAL
MODALITY: ABNORMAL
MONOCYTES # BLD AUTO: 0.48 10*3/MM3 (ref 0.1–0.9)
MONOCYTES # BLD AUTO: 0.53 10*3/MM3 (ref 0.1–0.9)
MONOCYTES # BLD AUTO: 0.55 10*3/MM3 (ref 0.1–0.9)
MONOCYTES # BLD AUTO: 0.56 10*3/MM3 (ref 0.1–0.9)
MONOCYTES # BLD AUTO: 0.56 10*3/MM3 (ref 0.1–0.9)
MONOCYTES # BLD AUTO: 0.6 10*3/MM3 (ref 0.1–0.9)
MONOCYTES # BLD AUTO: 0.61 10*3/MM3 (ref 0.1–0.9)
MONOCYTES # BLD AUTO: 0.62 10*3/MM3 (ref 0.1–0.9)
MONOCYTES # BLD AUTO: 0.64 10*3/MM3 (ref 0.1–0.9)
MONOCYTES # BLD AUTO: 0.66 10*3/MM3 (ref 0.1–0.9)
MONOCYTES # BLD: 0.37 10*3/MM3 (ref 0.1–0.9)
MONOCYTES # BLD: 0.38 10*3/MM3 (ref 0.1–0.9)
MONOCYTES # BLD: 0.99 10*3/MM3 (ref 0.1–0.9)
MONOCYTES NFR BLD AUTO: 4.2 % (ref 5–12)
MONOCYTES NFR BLD AUTO: 4.4 % (ref 5–12)
MONOCYTES NFR BLD AUTO: 6.4 % (ref 5–12)
MONOCYTES NFR BLD AUTO: 7.6 % (ref 5–12)
MONOCYTES NFR BLD AUTO: 8 % (ref 5–12)
MONOCYTES NFR BLD AUTO: 8.3 % (ref 5–12)
MONOCYTES NFR BLD AUTO: 8.6 % (ref 5–12)
MONOCYTES NFR BLD AUTO: 8.8 % (ref 5–12)
MONOCYTES NFR BLD AUTO: 9.1 % (ref 5–12)
MONOCYTES NFR BLD AUTO: 9.2 % (ref 5–12)
MYELOCYTES NFR BLD MANUAL: 1 % (ref 0–0)
MYELOCYTES NFR BLD MANUAL: 3 % (ref 0–0)
MYELOCYTES NFR BLD MANUAL: 4 % (ref 0–0)
MYOGLOBIN SERPL-MCNC: 179.5 NG/ML (ref 28–72)
NEUTROPHILS # BLD AUTO: 7.1 10*3/MM3 (ref 1.7–7)
NEUTROPHILS # BLD AUTO: 8.54 10*3/MM3 (ref 1.7–7)
NEUTROPHILS # BLD AUTO: 8.56 10*3/MM3 (ref 1.7–7)
NEUTROPHILS NFR BLD AUTO: 10.31 10*3/MM3 (ref 1.7–7)
NEUTROPHILS NFR BLD AUTO: 12.15 10*3/MM3 (ref 1.7–7)
NEUTROPHILS NFR BLD AUTO: 3.4 10*3/MM3 (ref 1.7–7)
NEUTROPHILS NFR BLD AUTO: 4.1 10*3/MM3 (ref 1.7–7)
NEUTROPHILS NFR BLD AUTO: 4.29 10*3/MM3 (ref 1.7–7)
NEUTROPHILS NFR BLD AUTO: 4.34 10*3/MM3 (ref 1.7–7)
NEUTROPHILS NFR BLD AUTO: 4.35 10*3/MM3 (ref 1.7–7)
NEUTROPHILS NFR BLD AUTO: 4.42 10*3/MM3 (ref 1.7–7)
NEUTROPHILS NFR BLD AUTO: 4.5 10*3/MM3 (ref 1.7–7)
NEUTROPHILS NFR BLD AUTO: 4.51 10*3/MM3 (ref 1.7–7)
NEUTROPHILS NFR BLD AUTO: 4.76 10*3/MM3 (ref 1.7–7)
NEUTROPHILS NFR BLD AUTO: 56.1 % (ref 42.7–76)
NEUTROPHILS NFR BLD AUTO: 56.9 % (ref 42.7–76)
NEUTROPHILS NFR BLD AUTO: 6.82 10*3/MM3 (ref 1.7–7)
NEUTROPHILS NFR BLD AUTO: 61.6 % (ref 42.7–76)
NEUTROPHILS NFR BLD AUTO: 61.7 % (ref 42.7–76)
NEUTROPHILS NFR BLD AUTO: 62.3 % (ref 42.7–76)
NEUTROPHILS NFR BLD AUTO: 62.5 % (ref 42.7–76)
NEUTROPHILS NFR BLD AUTO: 64.1 % (ref 42.7–76)
NEUTROPHILS NFR BLD AUTO: 65 % (ref 42.7–76)
NEUTROPHILS NFR BLD AUTO: 68.1 % (ref 42.7–76)
NEUTROPHILS NFR BLD AUTO: 82.3 % (ref 42.7–76)
NEUTROPHILS NFR BLD AUTO: 86.7 % (ref 42.7–76)
NEUTROPHILS NFR BLD AUTO: 89.6 % (ref 42.7–76)
NEUTROPHILS NFR BLD MANUAL: 65 % (ref 42.7–76)
NEUTROPHILS NFR BLD MANUAL: 69 % (ref 42.7–76)
NEUTROPHILS NFR BLD MANUAL: 70 % (ref 42.7–76)
NEUTS BAND NFR BLD MANUAL: 1 % (ref 0–5)
NEUTS BAND NFR BLD MANUAL: 4 % (ref 0–5)
NEUTS BAND NFR BLD MANUAL: 5 % (ref 0–5)
NITRITE UR QL STRIP: NEGATIVE
NOTE: ABNORMAL
NOTE: ABNORMAL
NRBC BLD AUTO-RTO: 0 /100 WBC (ref 0–0.2)
OPIATES UR QL: NEGATIVE
OXYCODONE UR QL SCN: NEGATIVE
OXYHGB MFR BLDV: 85.3 % (ref 94–99)
OXYHGB MFR BLDV: 93.8 % (ref 94–99)
PCO2 BLDA: 48.7 MM HG (ref 35–45)
PCO2 BLDA: 52.1 MM HG (ref 35–45)
PCO2 TEMP ADJ BLD: ABNORMAL MM[HG]
PCO2 TEMP ADJ BLD: ABNORMAL MM[HG]
PCP UR QL SCN: NEGATIVE
PH BLDA: 7.34 PH UNITS (ref 7.35–7.45)
PH BLDA: 7.38 PH UNITS (ref 7.35–7.45)
PH UR STRIP.AUTO: 6.5 [PH] (ref 5–8)
PH UR STRIP.AUTO: 7.5 [PH] (ref 5–8)
PH, TEMP CORRECTED: ABNORMAL
PH, TEMP CORRECTED: ABNORMAL
PLAT MORPH BLD: NORMAL
PLATELET # BLD AUTO: 118 10*3/MM3 (ref 140–450)
PLATELET # BLD AUTO: 133 10*3/MM3 (ref 140–450)
PLATELET # BLD AUTO: 152 10*3/MM3 (ref 140–450)
PLATELET # BLD AUTO: 153 10*3/MM3 (ref 140–450)
PLATELET # BLD AUTO: 167 10*3/MM3 (ref 140–450)
PLATELET # BLD AUTO: 180 10*3/MM3 (ref 140–450)
PLATELET # BLD AUTO: 202 10*3/MM3 (ref 140–450)
PLATELET # BLD AUTO: 208 10*3/MM3 (ref 140–450)
PLATELET # BLD AUTO: 211 10*3/MM3 (ref 140–450)
PLATELET # BLD AUTO: 212 10*3/MM3 (ref 140–450)
PLATELET # BLD AUTO: 220 10*3/MM3 (ref 140–450)
PLATELET # BLD AUTO: 221 10*3/MM3 (ref 140–450)
PLATELET # BLD AUTO: 223 10*3/MM3 (ref 140–450)
PLATELET # BLD AUTO: 223 10*3/MM3 (ref 140–450)
PLATELET # BLD AUTO: 275 10*3/MM3 (ref 140–450)
PMV BLD AUTO: 10 FL (ref 6–12)
PMV BLD AUTO: 10 FL (ref 6–12)
PMV BLD AUTO: 9.1 FL (ref 6–12)
PMV BLD AUTO: 9.1 FL (ref 6–12)
PMV BLD AUTO: 9.2 FL (ref 6–12)
PMV BLD AUTO: 9.3 FL (ref 6–12)
PMV BLD AUTO: 9.4 FL (ref 6–12)
PMV BLD AUTO: 9.6 FL (ref 6–12)
PMV BLD AUTO: 9.9 FL (ref 6–12)
PO2 BLDA: 55.2 MM HG (ref 83–108)
PO2 BLDA: 85.3 MM HG (ref 83–108)
PO2 TEMP ADJ BLD: ABNORMAL MM[HG]
PO2 TEMP ADJ BLD: ABNORMAL MM[HG]
POTASSIUM SERPL-SCNC: 4.3 MMOL/L (ref 3.5–5.2)
POTASSIUM SERPL-SCNC: 4.3 MMOL/L (ref 3.5–5.2)
POTASSIUM SERPL-SCNC: 4.4 MMOL/L (ref 3.5–5.2)
POTASSIUM SERPL-SCNC: 4.4 MMOL/L (ref 3.5–5.2)
POTASSIUM SERPL-SCNC: 4.5 MMOL/L (ref 3.5–5.2)
POTASSIUM SERPL-SCNC: 4.6 MMOL/L (ref 3.5–5.2)
POTASSIUM SERPL-SCNC: 4.7 MMOL/L (ref 3.5–5.2)
POTASSIUM SERPL-SCNC: 4.8 MMOL/L (ref 3.5–5.2)
POTASSIUM SERPL-SCNC: 4.8 MMOL/L (ref 3.5–5.2)
POTASSIUM SERPL-SCNC: 4.9 MMOL/L (ref 3.5–5.2)
POTASSIUM SERPL-SCNC: 5.1 MMOL/L (ref 3.5–5.2)
POTASSIUM SERPL-SCNC: 5.2 MMOL/L (ref 3.5–5.2)
PROPOXYPH UR QL: NEGATIVE
PROT SERPL-MCNC: 5.7 G/DL (ref 6–8.5)
PROT SERPL-MCNC: 5.8 G/DL (ref 6–8.5)
PROT SERPL-MCNC: 6.2 G/DL (ref 6–8.5)
PROT SERPL-MCNC: 6.4 G/DL (ref 6–8.5)
PROT SERPL-MCNC: 6.5 G/DL (ref 6–8.5)
PROT SERPL-MCNC: 6.8 G/DL (ref 6–8.5)
PROT SERPL-MCNC: 6.8 G/DL (ref 6–8.5)
PROT SERPL-MCNC: 6.9 G/DL (ref 6–8.5)
PROT SERPL-MCNC: 6.9 G/DL (ref 6–8.5)
PROT SERPL-MCNC: 7.1 G/DL (ref 6–8.5)
PROT SERPL-MCNC: 7.1 G/DL (ref 6–8.5)
PROT SERPL-MCNC: 7.2 G/DL (ref 6–8.5)
PROT SERPL-MCNC: 7.3 G/DL (ref 6–8.5)
PROT SERPL-MCNC: 7.4 G/DL (ref 6–8.5)
PROT UR QL STRIP: NEGATIVE
RBC # BLD AUTO: 3.55 10*6/MM3 (ref 4.14–5.8)
RBC # BLD AUTO: 4.18 10*6/MM3 (ref 4.14–5.8)
RBC # BLD AUTO: 4.58 10*6/MM3 (ref 4.14–5.8)
RBC # BLD AUTO: 4.61 10*6/MM3 (ref 4.14–5.8)
RBC # BLD AUTO: 4.63 10*6/MM3 (ref 4.14–5.8)
RBC # BLD AUTO: 4.65 10*6/MM3 (ref 4.14–5.8)
RBC # BLD AUTO: 4.71 10*6/MM3 (ref 4.14–5.8)
RBC # BLD AUTO: 4.73 10*6/MM3 (ref 4.14–5.8)
RBC # BLD AUTO: 4.74 10*6/MM3 (ref 4.14–5.8)
RBC # BLD AUTO: 4.75 10*6/MM3 (ref 4.14–5.8)
RBC # BLD AUTO: 4.75 10*6/MM3 (ref 4.14–5.8)
RBC # BLD AUTO: 4.86 10*6/MM3 (ref 4.14–5.8)
RBC # BLD AUTO: 5.14 10*6/MM3 (ref 4.14–5.8)
RBC MORPH BLD: NORMAL
SAO2 % BLDCOA: 87 % (ref 94–99)
SAO2 % BLDCOA: 95.8 % (ref 94–99)
SARS-COV-2 RNA RESP QL NAA+PROBE: NOT DETECTED
SCAN SLIDE: NORMAL
SODIUM SERPL-SCNC: 130 MMOL/L (ref 136–145)
SODIUM SERPL-SCNC: 131 MMOL/L (ref 136–145)
SODIUM SERPL-SCNC: 132 MMOL/L (ref 136–145)
SODIUM SERPL-SCNC: 133 MMOL/L (ref 136–145)
SODIUM SERPL-SCNC: 133 MMOL/L (ref 136–145)
SODIUM SERPL-SCNC: 134 MMOL/L (ref 136–145)
SODIUM SERPL-SCNC: 134 MMOL/L (ref 136–145)
SODIUM SERPL-SCNC: 135 MMOL/L (ref 136–145)
SODIUM SERPL-SCNC: 135 MMOL/L (ref 136–145)
SODIUM SERPL-SCNC: 136 MMOL/L (ref 136–145)
SODIUM SERPL-SCNC: 137 MMOL/L (ref 136–145)
SODIUM SERPL-SCNC: 137 MMOL/L (ref 136–145)
SODIUM SERPL-SCNC: 138 MMOL/L (ref 136–145)
SP GR UR STRIP: 1.01 (ref 1–1.03)
SP GR UR STRIP: 1.02 (ref 1–1.03)
TRICYCLICS UR QL SCN: NEGATIVE
UROBILINOGEN UR QL STRIP: ABNORMAL
UROBILINOGEN UR QL STRIP: NORMAL
VARIANT LYMPHS NFR BLD MANUAL: 14 % (ref 19.6–45.3)
VARIANT LYMPHS NFR BLD MANUAL: 16 % (ref 19.6–45.3)
VARIANT LYMPHS NFR BLD MANUAL: 21 % (ref 19.6–45.3)
VENTILATOR MODE: ABNORMAL
VENTILATOR MODE: ABNORMAL
WBC NRBC COR # BLD: 11.5 10*3/MM3 (ref 3.4–10.8)
WBC NRBC COR # BLD: 12.2 10*3/MM3 (ref 3.4–10.8)
WBC NRBC COR # BLD: 12.4 10*3/MM3 (ref 3.4–10.8)
WBC NRBC COR # BLD: 14.01 10*3/MM3 (ref 3.4–10.8)
WBC NRBC COR # BLD: 6.06 10*3/MM3 (ref 3.4–10.8)
WBC NRBC COR # BLD: 6.6 10*3/MM3 (ref 3.4–10.8)
WBC NRBC COR # BLD: 6.8 10*3/MM3 (ref 3.4–10.8)
WBC NRBC COR # BLD: 6.96 10*3/MM3 (ref 3.4–10.8)
WBC NRBC COR # BLD: 6.96 10*3/MM3 (ref 3.4–10.8)
WBC NRBC COR # BLD: 6.97 10*3/MM3 (ref 3.4–10.8)
WBC NRBC COR # BLD: 7.22 10*3/MM3 (ref 3.4–10.8)
WBC NRBC COR # BLD: 7.33 10*3/MM3 (ref 3.4–10.8)
WBC NRBC COR # BLD: 7.43 10*3/MM3 (ref 3.4–10.8)
WBC NRBC COR # BLD: 8.29 10*3/MM3 (ref 3.4–10.8)
WBC NRBC COR # BLD: 9.47 10*3/MM3 (ref 3.4–10.8)

## 2022-01-01 PROCEDURE — 25010000002 BEVACIZUMAB-AWWB 100 MG/4ML SOLUTION 4 ML VIAL: Performed by: INTERNAL MEDICINE

## 2022-01-01 PROCEDURE — 25010000002 MORPHINE PER 10 MG: Performed by: NURSE PRACTITIONER

## 2022-01-01 PROCEDURE — 87636 SARSCOV2 & INF A&B AMP PRB: CPT | Performed by: STUDENT IN AN ORGANIZED HEALTH CARE EDUCATION/TRAINING PROGRAM

## 2022-01-01 PROCEDURE — 80053 COMPREHEN METABOLIC PANEL: CPT | Performed by: INTERNAL MEDICINE

## 2022-01-01 PROCEDURE — 70553 MRI BRAIN STEM W/O & W/DYE: CPT | Performed by: RADIOLOGY

## 2022-01-01 PROCEDURE — 25010000002 FUROSEMIDE PER 20 MG: Performed by: INTERNAL MEDICINE

## 2022-01-01 PROCEDURE — 99223 1ST HOSP IP/OBS HIGH 75: CPT | Performed by: INTERNAL MEDICINE

## 2022-01-01 PROCEDURE — 0 GADOBENATE DIMEGLUMINE 529 MG/ML SOLUTION: Performed by: INTERNAL MEDICINE

## 2022-01-01 PROCEDURE — 85025 COMPLETE CBC W/AUTO DIFF WBC: CPT

## 2022-01-01 PROCEDURE — 70553 MRI BRAIN STEM W/O & W/DYE: CPT

## 2022-01-01 PROCEDURE — 96413 CHEMO IV INFUSION 1 HR: CPT

## 2022-01-01 PROCEDURE — 82805 BLOOD GASES W/O2 SATURATION: CPT

## 2022-01-01 PROCEDURE — 99285 EMERGENCY DEPT VISIT HI MDM: CPT

## 2022-01-01 PROCEDURE — 25010000002 BEVACIZUMAB-AWWB 400 MG/16ML SOLUTION 16 ML VIAL: Performed by: INTERNAL MEDICINE

## 2022-01-01 PROCEDURE — 85025 COMPLETE CBC W/AUTO DIFF WBC: CPT | Performed by: INTERNAL MEDICINE

## 2022-01-01 PROCEDURE — 0 GADOBENATE DIMEGLUMINE 529 MG/ML SOLUTION: Performed by: STUDENT IN AN ORGANIZED HEALTH CARE EDUCATION/TRAINING PROGRAM

## 2022-01-01 PROCEDURE — A9577 INJ MULTIHANCE: HCPCS | Performed by: STUDENT IN AN ORGANIZED HEALTH CARE EDUCATION/TRAINING PROGRAM

## 2022-01-01 PROCEDURE — 99215 OFFICE O/P EST HI 40 MIN: CPT | Performed by: NURSE PRACTITIONER

## 2022-01-01 PROCEDURE — 80306 DRUG TEST PRSMV INSTRMNT: CPT | Performed by: STUDENT IN AN ORGANIZED HEALTH CARE EDUCATION/TRAINING PROGRAM

## 2022-01-01 PROCEDURE — 82375 ASSAY CARBOXYHB QUANT: CPT

## 2022-01-01 PROCEDURE — 71045 X-RAY EXAM CHEST 1 VIEW: CPT | Performed by: RADIOLOGY

## 2022-01-01 PROCEDURE — 90686 IIV4 VACC NO PRSV 0.5 ML IM: CPT | Performed by: INTERNAL MEDICINE

## 2022-01-01 PROCEDURE — 36415 COLL VENOUS BLD VENIPUNCTURE: CPT | Performed by: INTERNAL MEDICINE

## 2022-01-01 PROCEDURE — 25010000002 LORAZEPAM PER 2 MG: Performed by: INTERNAL MEDICINE

## 2022-01-01 PROCEDURE — 25010000002 MORPHINE PER 10 MG: Performed by: INTERNAL MEDICINE

## 2022-01-01 PROCEDURE — 82077 ASSAY SPEC XCP UR&BREATH IA: CPT | Performed by: STUDENT IN AN ORGANIZED HEALTH CARE EDUCATION/TRAINING PROGRAM

## 2022-01-01 PROCEDURE — 99213 OFFICE O/P EST LOW 20 MIN: CPT | Performed by: STUDENT IN AN ORGANIZED HEALTH CARE EDUCATION/TRAINING PROGRAM

## 2022-01-01 PROCEDURE — 99214 OFFICE O/P EST MOD 30 MIN: CPT | Performed by: INTERNAL MEDICINE

## 2022-01-01 PROCEDURE — 80048 BASIC METABOLIC PNL TOTAL CA: CPT

## 2022-01-01 PROCEDURE — 81003 URINALYSIS AUTO W/O SCOPE: CPT

## 2022-01-01 PROCEDURE — 83874 ASSAY OF MYOGLOBIN: CPT | Performed by: STUDENT IN AN ORGANIZED HEALTH CARE EDUCATION/TRAINING PROGRAM

## 2022-01-01 PROCEDURE — 80053 COMPREHEN METABOLIC PANEL: CPT | Performed by: STUDENT IN AN ORGANIZED HEALTH CARE EDUCATION/TRAINING PROGRAM

## 2022-01-01 PROCEDURE — 85007 BL SMEAR W/DIFF WBC COUNT: CPT

## 2022-01-01 PROCEDURE — 0 GADOBENATE DIMEGLUMINE 529 MG/ML SOLUTION: Performed by: PHYSICIAN ASSISTANT

## 2022-01-01 PROCEDURE — A9577 INJ MULTIHANCE: HCPCS | Performed by: PHYSICIAN ASSISTANT

## 2022-01-01 PROCEDURE — 71045 X-RAY EXAM CHEST 1 VIEW: CPT

## 2022-01-01 PROCEDURE — 80053 COMPREHEN METABOLIC PANEL: CPT

## 2022-01-01 PROCEDURE — 99239 HOSP IP/OBS DSCHRG MGMT >30: CPT | Performed by: INTERNAL MEDICINE

## 2022-01-01 PROCEDURE — 70450 CT HEAD/BRAIN W/O DYE: CPT

## 2022-01-01 PROCEDURE — 82550 ASSAY OF CK (CPK): CPT | Performed by: STUDENT IN AN ORGANIZED HEALTH CARE EDUCATION/TRAINING PROGRAM

## 2022-01-01 PROCEDURE — 99214 OFFICE O/P EST MOD 30 MIN: CPT | Performed by: NURSE PRACTITIONER

## 2022-01-01 PROCEDURE — A9577 INJ MULTIHANCE: HCPCS | Performed by: INTERNAL MEDICINE

## 2022-01-01 PROCEDURE — 90471 IMMUNIZATION ADMIN: CPT | Performed by: INTERNAL MEDICINE

## 2022-01-01 PROCEDURE — 83050 HGB METHEMOGLOBIN QUAN: CPT

## 2022-01-01 PROCEDURE — 82140 ASSAY OF AMMONIA: CPT | Performed by: STUDENT IN AN ORGANIZED HEALTH CARE EDUCATION/TRAINING PROGRAM

## 2022-01-01 PROCEDURE — 85025 COMPLETE CBC W/AUTO DIFF WBC: CPT | Performed by: STUDENT IN AN ORGANIZED HEALTH CARE EDUCATION/TRAINING PROGRAM

## 2022-01-01 PROCEDURE — 36415 COLL VENOUS BLD VENIPUNCTURE: CPT

## 2022-01-01 PROCEDURE — 36600 WITHDRAWAL OF ARTERIAL BLOOD: CPT

## 2022-01-01 PROCEDURE — 70450 CT HEAD/BRAIN W/O DYE: CPT | Performed by: RADIOLOGY

## 2022-01-01 PROCEDURE — 25010000002 ONDANSETRON PER 1 MG: Performed by: STUDENT IN AN ORGANIZED HEALTH CARE EDUCATION/TRAINING PROGRAM

## 2022-01-01 PROCEDURE — 85007 BL SMEAR W/DIFF WBC COUNT: CPT | Performed by: INTERNAL MEDICINE

## 2022-01-01 PROCEDURE — 81003 URINALYSIS AUTO W/O SCOPE: CPT | Performed by: INTERNAL MEDICINE

## 2022-01-01 PROCEDURE — 25010000002 MORPHINE PER 10 MG: Performed by: STUDENT IN AN ORGANIZED HEALTH CARE EDUCATION/TRAINING PROGRAM

## 2022-01-01 RX ORDER — CETIRIZINE HYDROCHLORIDE 10 MG/1
10 TABLET ORAL DAILY PRN
Status: DISCONTINUED | OUTPATIENT
Start: 2022-01-01 | End: 2022-12-01 | Stop reason: HOSPADM

## 2022-01-01 RX ORDER — CETIRIZINE HYDROCHLORIDE 10 MG/1
TABLET ORAL
COMMUNITY
Start: 2022-01-01 | End: 2022-01-01 | Stop reason: SDUPTHER

## 2022-01-01 RX ORDER — DEXAMETHASONE 4 MG/1
4 TABLET ORAL 2 TIMES DAILY WITH MEALS
Qty: 60 TABLET | Refills: 5 | Status: SHIPPED | OUTPATIENT
Start: 2022-01-01 | End: 2022-01-01 | Stop reason: SDUPTHER

## 2022-01-01 RX ORDER — SODIUM CHLORIDE 9 MG/ML
250 INJECTION, SOLUTION INTRAVENOUS ONCE
Status: CANCELLED | OUTPATIENT
Start: 2022-01-01

## 2022-01-01 RX ORDER — LEVETIRACETAM 500 MG/1
500 TABLET ORAL EVERY 12 HOURS SCHEDULED
Status: DISCONTINUED | OUTPATIENT
Start: 2022-01-01 | End: 2022-12-01 | Stop reason: HOSPADM

## 2022-01-01 RX ORDER — TEMOZOLOMIDE 180 MG/1
180 CAPSULE ORAL DAILY
Qty: 5 CAPSULE | Refills: 3 | Status: SHIPPED | OUTPATIENT
Start: 2022-01-01 | End: 2022-01-01 | Stop reason: SDUPTHER

## 2022-01-01 RX ORDER — SODIUM CHLORIDE 9 MG/ML
250 INJECTION, SOLUTION INTRAVENOUS ONCE
Status: COMPLETED | OUTPATIENT
Start: 2022-01-01 | End: 2022-01-01

## 2022-01-01 RX ORDER — LORAZEPAM 1 MG/1
1 TABLET ORAL EVERY 8 HOURS PRN
Status: CANCELLED | OUTPATIENT
Start: 2022-01-01

## 2022-01-01 RX ORDER — SODIUM CHLORIDE 9 MG/ML
250 INJECTION, SOLUTION INTRAVENOUS ONCE
Status: CANCELLED | OUTPATIENT
Start: 2022-12-27

## 2022-01-01 RX ORDER — MORPHINE SULFATE 2 MG/ML
2 INJECTION, SOLUTION INTRAMUSCULAR; INTRAVENOUS
Status: DISCONTINUED | OUTPATIENT
Start: 2022-01-01 | End: 2022-01-01

## 2022-01-01 RX ORDER — ZOLPIDEM TARTRATE 5 MG/1
5 TABLET ORAL NIGHTLY PRN
Qty: 30 TABLET | Refills: 3 | Status: SHIPPED | OUTPATIENT
Start: 2022-01-01

## 2022-01-01 RX ORDER — ONDANSETRON 2 MG/ML
4 INJECTION INTRAMUSCULAR; INTRAVENOUS ONCE
Status: COMPLETED | OUTPATIENT
Start: 2022-01-01 | End: 2022-01-01

## 2022-01-01 RX ORDER — PROCHLORPERAZINE MALEATE 10 MG
10 TABLET ORAL EVERY 6 HOURS PRN
Qty: 60 TABLET | Refills: 1 | Status: SHIPPED | OUTPATIENT
Start: 2022-01-01

## 2022-01-01 RX ORDER — SODIUM CHLORIDE 9 MG/ML
250 INJECTION, SOLUTION INTRAVENOUS ONCE
Status: CANCELLED | OUTPATIENT
Start: 2023-01-24

## 2022-01-01 RX ORDER — LORAZEPAM 2 MG/ML
1 INJECTION INTRAMUSCULAR EVERY 4 HOURS PRN
Status: DISCONTINUED | OUTPATIENT
Start: 2022-01-01 | End: 2022-01-01

## 2022-01-01 RX ORDER — IPRATROPIUM BROMIDE AND ALBUTEROL SULFATE 2.5; .5 MG/3ML; MG/3ML
3 SOLUTION RESPIRATORY (INHALATION) EVERY 4 HOURS PRN
Status: CANCELLED | OUTPATIENT
Start: 2022-01-01

## 2022-01-01 RX ORDER — SODIUM CHLORIDE 0.9 % (FLUSH) 0.9 %
10 SYRINGE (ML) INJECTION EVERY 12 HOURS SCHEDULED
Status: DISCONTINUED | OUTPATIENT
Start: 2022-01-01 | End: 2022-12-01 | Stop reason: HOSPADM

## 2022-01-01 RX ORDER — ASPIRIN 81 MG/1
81 TABLET, CHEWABLE ORAL DAILY
Status: CANCELLED | OUTPATIENT
Start: 2022-01-01

## 2022-01-01 RX ORDER — FUROSEMIDE 10 MG/ML
40 INJECTION INTRAMUSCULAR; INTRAVENOUS ONCE
Status: COMPLETED | OUTPATIENT
Start: 2022-01-01 | End: 2022-01-01

## 2022-01-01 RX ORDER — LOSARTAN POTASSIUM 50 MG/1
50 TABLET ORAL DAILY
Status: CANCELLED | OUTPATIENT
Start: 2022-01-01

## 2022-01-01 RX ORDER — LORAZEPAM 1 MG/1
1 TABLET ORAL EVERY 8 HOURS PRN
Qty: 90 TABLET | Refills: 0 | Status: SHIPPED | OUTPATIENT
Start: 2022-01-01

## 2022-01-01 RX ORDER — ACETAMINOPHEN 325 MG/1
650 TABLET ORAL EVERY 6 HOURS PRN
Status: DISCONTINUED | OUTPATIENT
Start: 2022-01-01 | End: 2022-12-01 | Stop reason: HOSPADM

## 2022-01-01 RX ORDER — ATORVASTATIN CALCIUM 10 MG/1
10 TABLET, FILM COATED ORAL DAILY
Status: CANCELLED | OUTPATIENT
Start: 2022-01-01

## 2022-01-01 RX ORDER — CASTOR OIL AND BALSAM, PERU 788; 87 MG/G; MG/G
1 OINTMENT TOPICAL EVERY 12 HOURS SCHEDULED
Status: DISCONTINUED | OUTPATIENT
Start: 2022-01-01 | End: 2022-12-01 | Stop reason: HOSPADM

## 2022-01-01 RX ORDER — ONDANSETRON 2 MG/ML
4 INJECTION INTRAMUSCULAR; INTRAVENOUS EVERY 6 HOURS PRN
Status: DISCONTINUED | OUTPATIENT
Start: 2022-01-01 | End: 2022-12-01 | Stop reason: HOSPADM

## 2022-01-01 RX ORDER — ONDANSETRON 4 MG/1
8 TABLET, FILM COATED ORAL 3 TIMES DAILY PRN
Status: CANCELLED | OUTPATIENT
Start: 2022-01-01

## 2022-01-01 RX ORDER — DEXAMETHASONE 4 MG/1
4 TABLET ORAL 2 TIMES DAILY WITH MEALS
Qty: 60 TABLET | Refills: 5 | Status: SHIPPED | OUTPATIENT
Start: 2022-01-01

## 2022-01-01 RX ORDER — ONDANSETRON HYDROCHLORIDE 8 MG/1
8 TABLET, FILM COATED ORAL 3 TIMES DAILY PRN
Qty: 30 TABLET | Refills: 5 | Status: SHIPPED | OUTPATIENT
Start: 2022-01-01

## 2022-01-01 RX ORDER — LORAZEPAM 2 MG/ML
0.5 INJECTION INTRAMUSCULAR EVERY 4 HOURS PRN
Status: DISCONTINUED | OUTPATIENT
Start: 2022-01-01 | End: 2022-01-01

## 2022-01-01 RX ORDER — ZOLPIDEM TARTRATE 5 MG/1
5 TABLET ORAL NIGHTLY PRN
Status: CANCELLED | OUTPATIENT
Start: 2022-01-01

## 2022-01-01 RX ORDER — SODIUM CHLORIDE 9 MG/ML
40 INJECTION, SOLUTION INTRAVENOUS AS NEEDED
Status: DISCONTINUED | OUTPATIENT
Start: 2022-01-01 | End: 2022-12-01 | Stop reason: HOSPADM

## 2022-01-01 RX ORDER — GLYCOPYRROLATE 0.2 MG/ML
0.4 INJECTION INTRAMUSCULAR; INTRAVENOUS EVERY 4 HOURS PRN
Status: DISCONTINUED | OUTPATIENT
Start: 2022-01-01 | End: 2022-12-01 | Stop reason: HOSPADM

## 2022-01-01 RX ORDER — PROCHLORPERAZINE MALEATE 10 MG
10 TABLET ORAL EVERY 6 HOURS PRN
Status: DISCONTINUED | OUTPATIENT
Start: 2022-01-01 | End: 2022-12-01 | Stop reason: HOSPADM

## 2022-01-01 RX ORDER — SODIUM CHLORIDE 9 MG/ML
250 INJECTION, SOLUTION INTRAVENOUS ONCE
Status: CANCELLED | OUTPATIENT
Start: 2023-01-10

## 2022-01-01 RX ORDER — ALBUTEROL SULFATE 2.5 MG/3ML
2.5 SOLUTION RESPIRATORY (INHALATION) EVERY 4 HOURS PRN
Status: DISCONTINUED | OUTPATIENT
Start: 2022-01-01 | End: 2022-12-01 | Stop reason: HOSPADM

## 2022-01-01 RX ORDER — SODIUM CHLORIDE 0.9 % (FLUSH) 0.9 %
10 SYRINGE (ML) INJECTION AS NEEDED
Status: DISCONTINUED | OUTPATIENT
Start: 2022-01-01 | End: 2022-12-01 | Stop reason: HOSPADM

## 2022-01-01 RX ORDER — CETIRIZINE HYDROCHLORIDE 10 MG/1
10 TABLET ORAL DAILY PRN
COMMUNITY
Start: 2022-01-01

## 2022-01-01 RX ORDER — TEMOZOLOMIDE 180 MG/1
180 CAPSULE ORAL DAILY
Qty: 5 CAPSULE | Refills: 3 | Status: SHIPPED | OUTPATIENT
Start: 2022-01-01 | End: 2022-01-01

## 2022-01-01 RX ORDER — ACETAMINOPHEN 650 MG/1
650 SUPPOSITORY RECTAL EVERY 6 HOURS PRN
Status: DISCONTINUED | OUTPATIENT
Start: 2022-01-01 | End: 2022-12-01 | Stop reason: HOSPADM

## 2022-01-01 RX ORDER — SCOLOPAMINE TRANSDERMAL SYSTEM 1 MG/1
2 PATCH, EXTENDED RELEASE TRANSDERMAL
Status: DISCONTINUED | OUTPATIENT
Start: 2022-01-01 | End: 2022-12-01 | Stop reason: HOSPADM

## 2022-01-01 RX ORDER — IPRATROPIUM BROMIDE AND ALBUTEROL SULFATE 2.5; .5 MG/3ML; MG/3ML
3 SOLUTION RESPIRATORY (INHALATION) EVERY 4 HOURS PRN
Qty: 360 ML | Refills: 2 | Status: SHIPPED | OUTPATIENT
Start: 2022-01-01

## 2022-01-01 RX ORDER — LORAZEPAM 2 MG/ML
0.5 INJECTION INTRAMUSCULAR
Status: DISCONTINUED | OUTPATIENT
Start: 2022-01-01 | End: 2022-12-01 | Stop reason: HOSPADM

## 2022-01-01 RX ORDER — TEMOZOLOMIDE 140 MG/1
140 CAPSULE ORAL DAILY
Qty: 5 CAPSULE | Refills: 3 | Status: SHIPPED | OUTPATIENT
Start: 2022-01-01 | End: 2022-01-01

## 2022-01-01 RX ORDER — ALBUTEROL SULFATE 2.5 MG/3ML
2.5 SOLUTION RESPIRATORY (INHALATION) EVERY 4 HOURS PRN
Qty: 180 ML | Refills: 3 | Status: SHIPPED | OUTPATIENT
Start: 2022-01-01

## 2022-01-01 RX ORDER — SCOLOPAMINE TRANSDERMAL SYSTEM 1 MG/1
1 PATCH, EXTENDED RELEASE TRANSDERMAL
Status: DISCONTINUED | OUTPATIENT
Start: 2022-01-01 | End: 2022-01-01

## 2022-01-01 RX ORDER — SODIUM CHLORIDE 9 MG/ML
250 INJECTION, SOLUTION INTRAVENOUS ONCE
Status: CANCELLED | OUTPATIENT
Start: 2022-12-13

## 2022-01-01 RX ORDER — DEXAMETHASONE 4 MG/1
4 TABLET ORAL 2 TIMES DAILY WITH MEALS
Status: CANCELLED | OUTPATIENT
Start: 2022-01-01

## 2022-01-01 RX ORDER — TEMOZOLOMIDE 140 MG/1
140 CAPSULE ORAL DAILY
Qty: 5 CAPSULE | Refills: 3 | Status: SHIPPED | OUTPATIENT
Start: 2022-01-01 | End: 2022-01-01 | Stop reason: SDUPTHER

## 2022-01-01 RX ORDER — ATENOLOL 50 MG/1
50 TABLET ORAL DAILY
Status: DISCONTINUED | OUTPATIENT
Start: 2022-01-01 | End: 2022-12-01 | Stop reason: HOSPADM

## 2022-01-01 RX ADMIN — MORPHINE SULFATE 4 MG: 4 INJECTION, SOLUTION INTRAMUSCULAR; INTRAVENOUS at 17:13

## 2022-01-01 RX ADMIN — SODIUM CHLORIDE 250 ML: 9 INJECTION, SOLUTION INTRAVENOUS at 12:21

## 2022-01-01 RX ADMIN — BEVACIZUMAB-AWWB 1080 MG: 400 INJECTION, SOLUTION INTRAVENOUS at 12:38

## 2022-01-01 RX ADMIN — SODIUM CHLORIDE 250 ML: 9 INJECTION, SOLUTION INTRAVENOUS at 11:28

## 2022-01-01 RX ADMIN — ONDANSETRON 4 MG: 2 INJECTION INTRAMUSCULAR; INTRAVENOUS at 09:52

## 2022-01-01 RX ADMIN — GLYCOPYRROLATE 0.4 MG: 0.2 INJECTION INTRAMUSCULAR; INTRAVENOUS at 14:56

## 2022-01-01 RX ADMIN — SCOPALAMINE 1 PATCH: 1 PATCH, EXTENDED RELEASE TRANSDERMAL at 11:18

## 2022-01-01 RX ADMIN — CASTOR OIL AND BALSAM, PERU 1 APPLICATION: 788; 87 OINTMENT TOPICAL at 20:15

## 2022-01-01 RX ADMIN — GLYCOPYRROLATE 0.4 MG: 0.2 INJECTION INTRAMUSCULAR; INTRAVENOUS at 20:13

## 2022-01-01 RX ADMIN — CASTOR OIL AND BALSAM, PERU 1 APPLICATION: 788; 87 OINTMENT TOPICAL at 16:20

## 2022-01-01 RX ADMIN — MORPHINE SULFATE 4 MG: 4 INJECTION, SOLUTION INTRAMUSCULAR; INTRAVENOUS at 09:51

## 2022-01-01 RX ADMIN — Medication 10 ML: at 08:17

## 2022-01-01 RX ADMIN — LORAZEPAM 1 MG: 2 INJECTION INTRAMUSCULAR; INTRAVENOUS at 05:45

## 2022-01-01 RX ADMIN — SODIUM CHLORIDE 250 ML: 9 INJECTION, SOLUTION INTRAVENOUS at 12:38

## 2022-01-01 RX ADMIN — BEVACIZUMAB-AWWB 1000 MG: 400 INJECTION, SOLUTION INTRAVENOUS at 11:28

## 2022-01-01 RX ADMIN — GADOBENATE DIMEGLUMINE 20 ML: 529 INJECTION, SOLUTION INTRAVENOUS at 14:24

## 2022-01-01 RX ADMIN — SODIUM CHLORIDE 1000 ML: 9 INJECTION, SOLUTION INTRAVENOUS at 10:11

## 2022-01-01 RX ADMIN — MORPHINE SULFATE 4 MG: 4 INJECTION, SOLUTION INTRAMUSCULAR; INTRAVENOUS at 14:56

## 2022-01-01 RX ADMIN — GLYCOPYRROLATE 0.4 MG: 0.2 INJECTION INTRAMUSCULAR; INTRAVENOUS at 08:51

## 2022-01-01 RX ADMIN — GADOBENATE DIMEGLUMINE 20 ML: 529 INJECTION, SOLUTION INTRAVENOUS at 09:05

## 2022-01-01 RX ADMIN — GADOBENATE DIMEGLUMINE 20 ML: 529 INJECTION, SOLUTION INTRAVENOUS at 14:25

## 2022-01-01 RX ADMIN — SODIUM CHLORIDE 250 ML: 9 INJECTION, SOLUTION INTRAVENOUS at 11:27

## 2022-01-01 RX ADMIN — LORAZEPAM 1 MG: 2 INJECTION INTRAMUSCULAR; INTRAVENOUS at 02:05

## 2022-01-01 RX ADMIN — GLYCOPYRROLATE 0.4 MG: 0.2 INJECTION INTRAMUSCULAR; INTRAVENOUS at 21:21

## 2022-01-01 RX ADMIN — BEVACIZUMAB-AWWB 1000 MG: 400 INJECTION, SOLUTION INTRAVENOUS at 12:22

## 2022-01-01 RX ADMIN — LORAZEPAM 1 MG: 2 INJECTION INTRAMUSCULAR; INTRAVENOUS at 11:22

## 2022-01-01 RX ADMIN — Medication 10 ML: at 20:16

## 2022-01-01 RX ADMIN — Medication 10 ML: at 21:19

## 2022-01-01 RX ADMIN — GADOBENATE DIMEGLUMINE 20 ML: 529 INJECTION, SOLUTION INTRAVENOUS at 11:28

## 2022-01-01 RX ADMIN — BEVACIZUMAB-AWWB 1080 MG: 400 INJECTION, SOLUTION INTRAVENOUS at 11:27

## 2022-01-01 RX ADMIN — SCOPALAMINE 2 PATCH: 1 PATCH, EXTENDED RELEASE TRANSDERMAL at 16:21

## 2022-01-01 RX ADMIN — FUROSEMIDE 40 MG: 10 INJECTION, SOLUTION INTRAVENOUS at 09:53

## 2022-01-01 RX ADMIN — MORPHINE SULFATE 4 MG: 4 INJECTION, SOLUTION INTRAMUSCULAR; INTRAVENOUS at 11:21

## 2022-01-01 RX ADMIN — ACETAMINOPHEN 650 MG: 325 TABLET ORAL at 21:19

## 2022-01-05 NOTE — PROGRESS NOTES
Name:  Kennedy Reardon  :  1965  Date:  2022     REFERRING PHYSICIAN  Gilbert Harrell MD    PRIMARY CARE PHYSICIAN  Feliberto Moore MD    REASON FOR FOLLOWUP  1. Glioma (HCC)      CHIEF COMPLAINT  None.    Dear Dr. Ag,    HISTORY OF PRESENT ILLNESS:   I saw Mr. Reardon in follow up today in our medical oncology clinic. As you are aware, he is a pleasant, 56 y.o., white male with a history of minimal medical problems who was in his usual state of health until late 2020 when he suffered a couple of seizures a few days apart. He presented to our ED following the second episode and was med-flighted to [facility] due to concerns that he was suffering a stroke. Ultimately, an MRI of the brain identified a cystic mass in the left temporoparietal region; and you took him to the OR on 2020. The final, surgical pathology was consistent with glioblastoma multiforme. He recovered uneventfully from his craniotomy, and he was subsequently referred to our clinic for assistance with his management closer to his home in [place], [place].     INTERIM HISTORY:  Mr. Reardon presents today for follow up again accompanied by his wife. He completed concomitant, daily PO Temodar and XRT on 2020 and tolerated this regimen well, with mild nausea as his only reported noticeable side effects. He was subsequently agreeable to transitioning to qmonthly, PO Temodar alone and began this regimen in mid-2021. He has now completed a total of ~twelve (12) cycles and continues to tolerate this treatment well, with mild, but very tolerable, nausea for the first couple of days of each cycle (he has still not even had to take any antiemetics). He once again denies any specific complaints.    Past Medical History:   Diagnosis Date   • Brain cancer (HCC)    • History of radiation therapy 2020    left temporal lobe of brain   • Hyperlipidemia    • Hypertension    • Medical history reviewed with  no changes    • Seizures (HCC)        Past Surgical History:   Procedure Laterality Date   • CRANIOTOMY FOR TUMOR Left 8/28/2020    Procedure: CRANIOTOMY FOR TUMOR LEFT;  Surgeon: Gilbert Harrell MD;  Location: [facility];  Service: Neurosurgery;  Laterality: Left;       Social History     Socioeconomic History   • Marital status:    Tobacco Use   • Smoking status: Current Every Day Smoker     Packs/day: 0.50   • Smokeless tobacco: Never Used   Vaping Use   • Vaping Use: Never used   Substance and Sexual Activity   • Alcohol use: No     Comment: few beers every now and then   • Drug use: Defer   • Sexual activity: Defer       Family History   Problem Relation Age of Onset   • Osteoarthritis Mother    • Rheum arthritis Mother    • Hypertension Mother    • Cancer Father    • Osteoporosis Sister    • Osteoarthritis Maternal Grandfather    • Rheum arthritis Maternal Grandfather    • Heart disease Paternal Grandmother    • Cancer Paternal Grandfather    • Heart disease Paternal Grandfather    • Diabetes Paternal Grandfather        No Known Allergies     .ECOG score: 0               Current Outpatient Medications   Medication Sig Dispense Refill   • aspirin 81 MG chewable tablet Chew 81 mg Daily.     • atenolol (TENORMIN) 50 MG tablet Take 50 mg by mouth Daily.     • cetirizine (zyrTEC) 10 MG tablet Take 10 mg by mouth Daily.     • levETIRAcetam (KEPPRA) 500 MG tablet Take 1 tablet by mouth Every 12 (Twelve) Hours. 180 tablet 4   • losartan (COZAAR) 50 MG tablet Take 50 mg by mouth Daily.     • lovastatin (MEVACOR) 20 MG tablet Take 20 mg by mouth 2 (two) times a day.     • ondansetron ODT (ZOFRAN-ODT) 4 MG disintegrating tablet Place 1 tablet on the tongue Every 6 (Six) Hours As Needed for Nausea or Vomiting for up to 12 doses. 12 tablet 0   • prochlorperazine (COMPAZINE) 10 MG tablet Take 1 tablet by mouth Every 8 (Eight) Hours As Needed for Nausea or Vomiting. 90 tablet 5     No current facility-administered  "medications for this visit.     REVIEW OF SYSTEMS  CONSTITUTIONAL:  No fever, chills or night sweats.  EYES:  No blurry vision, diplopia or other vision changes. Improved photosensitivity, no longer wearing sunglasses indoors (as he was doing at the time of his initial consultation).  ENT:  No hearing loss, nosebleeds or sore throat.  CARDIOVASCULAR:  No palpitations, arrhythmia, syncopal episodes or edema.  PULMONARY:  No hemoptysis, wheezing, chronic cough or shortness of breath.  GASTROINTESTINAL:  No constipation or diarrhea. No abdominal pain. Mild, nausea for the first couple of days after starting each cycle of qmonthly, PO Temodar, as per the HPI above.  GENITOURINARY:  No hematuria, kidney stones or frequent urination.  MUSCULOSKELETAL:  No joint or back pains.  INTEGUMENTARY: No rashes or pruritus.  ENDOCRINE:  No excessive thirst or hot flashes.  HEMATOLOGIC:  No history of free bleeding, spontaneous bleeding or clotting.  IMMUNOLOGIC:  No allergies or frequent infections.  NEUROLOGIC: As per the HPI above.  PSYCHIATRIC:  No anxiety or depression.    PHYSICAL EXAMINATION  /83   Pulse 74   Temp 98.4 °F (36.9 °C) (Temporal)   Resp 18   Ht 175.3 cm (69\")   Wt 108 kg (237 lb 3.2 oz)   SpO2 97%   BMI 35.03 kg/m²     Pain Score:  Pain Score    22 1304   PainSc: 0-No pain     PHQ-Score Total:  PHQ-9 Total Score: 0    ECO  GENERAL:  A well-developed, well-nourished, white male in no acute distress.  HEENT:  Pupils equally round reactive to light. Extraocular muscles intact.  CARDIOVASCULAR:  Regular rate and rhythm. No murmurs, gallops or rubs.  LUNGS:  Clear to auscultation bilaterally.  ABDOMEN:  Soft, nontender, nondistended with positive bowel sounds.  EXTREMITIES:  No clubbing, cyanosis or edema bilaterally.  SKIN:  No rashes or petechiae.  NEURO:  No focal deficits.  PSYCH:  Alert and oriented x3.    The physical exam is unchanged from recent priors.    LABORATORY  Lab Results "   Component Value Date    WBC 7.33 01/05/2022    HGB 14.7 01/05/2022    HCT 42.4 01/05/2022    MCV 92.0 01/05/2022     01/05/2022    NEUTROABS 4.51 01/05/2022       Lab Results   Component Value Date     12/02/2021    K 4.7 12/02/2021     12/02/2021    CO2 25.3 12/02/2021    BUN 11 12/02/2021    CREATININE 0.74 (L) 12/02/2021    GLUCOSE 128 (H) 12/02/2021    CALCIUM 9.1 12/02/2021    AST 21 12/02/2021    ALT 33 12/02/2021    ALKPHOS 86 12/02/2021    BILITOT 0.3 12/02/2021    PROTEINTOT 7.0 12/02/2021    ALBUMIN 4.11 12/02/2021     CBC (01/05/2022): WBCs: 7.33; HgB: 14.7; Hct: 42.4; platelets: 208  CBC (10/06/2021): WBCs: 6.76; HgB: 14.4; Hct: 43.2; platelets: 213  CBC (07/07/2021): WBCs: 6.48; HgB: 14.3; Hct: 42.6; platelets: 204  CBC (05/05/2021): WBCs: 7.1; HgB: 15.5; Hct: 46.4; platelets: 208  CBC (03/30/2021): WBCs: 7.4; HgB: 14.9; Hct: 44.1; platelets: 240  CBC (03/03/2021): WBCs: 7.4; HgB: 15.1; Hct: 45.5; platelets: 244  CBC (02/03/2021): WBCs: 7.0; HgB: 15.3; Hct: 45.3; platelets: 244  CBC (01/04/2021): WBCs: 7.4; HgB: 15.0; Hct: 45.5; platelets: 182  CBC (11/25/2020): WBCs: 7.8; HgB: 15.0; Hct: 45.0; platelets: 258    IMAGING  MRI brain with contrast (08/27/2020):  Impression: Large cystic mass identified in the left temporoparietal region with surgical markers in place for surgical planning. The largest axial dimension of the nodular component is 2.5 x 1.6 cm.    MRI brain with and without contrast (08/31/2020):  Impression: Postsurgical changes left temporal craniotomy and resection with expected early postoperative changes including dural enhancement mildly prominent along the left inferolateral margin of the resection site with attention on followup otherwise, no residual soft tissue nodular enhancement or mass-occupying focus of enhancement. No midline shift or hydrocephalus.    MRI brain with and without contrast (10/01/2020):  Impression: T2 signal abnormality surrounding a left  temporal mass measuring approximately 1.8 x 2.9 cm that shows predominantly peripheral enhancement. Some postsurgical changes noted overlying this region, but no prior examination is available for comparison.    MRI brain without contrast (12/03/2020):  Impression: The left temporal lobe lesion is slightly smaller now with several cystic areas in the lesion. There is less edema than on the previous exam in 10/2020.    MRI brain with and without contrast (02/04/2021):  Impression: [Remains stable per neurosurgery interpretation.]    MRI brain with and without contrast (04/01/2021):  Impression:  1) Based on retrospective remeasurement, no interval change in size of enhancing mass left temporal lobe with cystic components and no change in the degree of peritumoral signal abnormality/vasogenic edema. Today's measurements are 3.0 x 2.4 cm.  2) Localized mass effect but no hydrocephalus or midline shift.  3) No new enhancing brain lesions identified.    MRI brain with and without contrast (04/29/2021):  Impression: Again there is a peripherally enhancing mass in the right temporal lobe again measuring about 2.1 x 2.6 cm. Again there is surrounding vasogenic edema.    MRI brain with and without contrast (06/25/2021):  Impression:  1) Contrast-enhancing mass in the left temporal lobe very similar to the previous exam. Surrounding vasogenic edema again noted.  2) No new contrast-enhancing lesions.    MRI brain with and without contrast (09/16/2021, compared to 06/25/2021):  Impression:  1) Given differences in technique, there is no interval change in the predominantly left temporal region enhancing process subjacent to the craniotomy site with no change in the degree of vasogenic edema or peritumoral signal abnormality. Lesion is approximately 2.5 x 2.1 cm and was previously 2.6 x 2.2 cm.  2) There remains no evidence of midline shift or hydrocephalus.  3) No new areas of pathologic enhancement identified. No acute  intracranial findings are noted.    MRI brain with and without contrast (12/02/2021, compared to 09/16/2021):  Impression: Improvement in the brain. The enhancing glioma in the temporal lobe is slightly smaller in size with less surrounding edema in the brain parenchyma.    PATHOLOGY  Temporal lobe mass (08/28/2020):  Glioblastoma multiforme. IDH1/IDH2 mutation not detected. 1p/19q co-deletion not detected. MGMT gene promotor methylation: Detected.    IMPRESSION AND PLAN  Mr. Reardon is a 56 y.o., white male with:  Glioblastoma multiforme: Initially diagnosed in late August 2020 following a couple of seizure events a few days apart and status post craniotomy with resection of a left, temporoparietal region, cystic mass on 08/27/2020. The final surgical pathology results are summarized above, and he recovered well from this procedure. I have had multiple, long discussions with the patient (+/- his wife) since the time of his initial consultation in our clinic (on 09/30/2020) regarding this diagnosis and its prognosis, reiterating much of what they were previously told by [facility] neurosurgery. They remain aware that his diagnosis is, unfortunately, ultimately very poor; however, this malignancy was, and remains, potentially treatable and sustained remissions are possible. Adjuvant, concomitant chemotherapy and radiation was recommended. He began a ~six-week course of this regimen (with temozolomide 75 mg/m2 daily with XRT; patient dose: 160 mg) by mid-October 2020 and completed it by early December 2020, overall tolerating this regimen well, with some mild, manageable nausea as his only noticeable side effect. He had a repeat MRI with [facility] neurosurgery on 12/03/2020, following the conclusion of his chemo/XRT, which showed overall improvement (summarized above). Subsequently, he was felt to be a good candidate to transition to adjuvant, qmonthly PO Temodar alone (150 mg/m2 on days 1-5 of 28-day  cycles; patient dose: 320 mg). He began this qmonthly therapy in mid-January 2021. He has now completed a total of twelve (12) cycles, and he continues to tolerate this regimen overall well (with some mild, manageable nausea for the first couple of days as his only noticeable side effect; he has still not had to take any antiemetics). Meanwhile, the most recent repeat MRI of the brain (performed on 12/02/2021 and summarized above) was improved compared to priors. We will proceed with this current, adjuvant treatment plan (cycle #13 scheduled next week). We will continue to check CBCs and CMPs on a qmonthly basis, just prior to the start of each cycle of Temodar. He will follow up with neurosurgery again in June 2022 with another repeat MRI of the brain, as previously planned. We will see him back in our clinic in ~six months, shortly after these (the MRI and NS) appointments and just prior to the start of the potential nineteenth cycle of qmonthly Temodar, with a CBC and CMP. The patient and his wife were in agreement with these plans.    It is a pleasure to participate in Mr. Reardon's care. Please do not hesitate to call with any questions or concerns that you may have.    A total of 30 minutes were spent coordinating this patient’s care in clinic today; more than 50% of this time was face-to-face with the patient and his wife, reviewing his interim medical history, discussing the results of the most recent labwork and repeat MRI of the brain and counseling on the current treatment and followup plan. All questions were answered to their satisfaction.    FOLLOW UP  Proceed with adjuvant, qmonthly, PO Temodar alone, as planned (cycle #13 scheduled to start the first of next week). With neurosurgery with a repeat MRI of the brain in June 2022, as previously planned. Return to our clinic in 6 months (late June/early July 2022), shortly after the next MRI and NS appointments and just prior to the start of the potential  19th cycle of qmonthly Temodar, respectively, with a CBC and CMP. Qmonthly CBCs and CMPs between now and then (just prior to the start of each cycle of Temodar).             This document was electronically signed by KEON Casey MD January 5, 2022 13:12 EST      CC: MD Zena Mason MD James W. Killian, MD

## 2022-02-01 NOTE — PROGRESS NOTES
Specialty Pharmacy Patient Management Program  Oncology Reassessment     Kennedy Reardon is a 56 y.o. male with GBM seen by an Oncology provider and enrolled in the Oncology Patient Management program offered by [facility].  A follow-up outreach was conducted, including assessment of continued therapy appropriateness, medication adherence, and side effect incidence and management for Temodar.       Relevant Past Medical History and Comorbidities  Relevant medical history and concomitant health conditions were discussed with the patient. The patient's chart has been reviewed for relevant past medical history and comorbid health conditions and updated as necessary.   Past Medical History:   Diagnosis Date   • Brain cancer (HCC)    • History of radiation therapy 12/02/2020    left temporal lobe of brain   • Hyperlipidemia    • Hypertension    • Medical history reviewed with no changes    • Seizures (HCC)      Social History     Socioeconomic History   • Marital status:    Tobacco Use   • Smoking status: Current Every Day Smoker     Packs/day: 0.50   • Smokeless tobacco: Never Used   Vaping Use   • Vaping Use: Never used   Substance and Sexual Activity   • Alcohol use: No     Comment: few beers every now and then   • Drug use: Defer   • Sexual activity: Defer       Allergies  Known allergies and reactions were discussed with the patient. The patient's chart has been reviewed for allergy information and updated as necessary.   Patient has no known allergies.    Relevant Laboratory Values  Lab Results   Component Value Date    GLUCOSE 111 (H) 01/05/2022    CALCIUM 9.0 01/05/2022     01/05/2022    K 4.3 01/05/2022    CO2 24.9 01/05/2022     01/05/2022    BUN 6 01/05/2022    CREATININE 0.89 01/05/2022    EGFRIFNONA 89 01/05/2022    BCR 6.7 (L) 01/05/2022    ANIONGAP 10.1 01/05/2022     Lab Results   Component Value Date    WBC 7.33 01/05/2022    RBC 4.61 01/05/2022    HGB 14.7  01/05/2022    HCT 42.4 01/05/2022    MCV 92.0 01/05/2022    MCH 31.9 01/05/2022    MCHC 34.7 01/05/2022    RDW 12.3 01/05/2022    RDWSD 41.3 01/05/2022    MPV 9.6 01/05/2022     01/05/2022    NEUTRORELPCT 61.6 01/05/2022    LYMPHORELPCT 27.1 01/05/2022    MONORELPCT 7.6 01/05/2022    EOSRELPCT 2.6 01/05/2022    BASORELPCT 0.8 01/05/2022    AUTOIGPER 0.3 01/05/2022    NEUTROABS 4.51 01/05/2022    LYMPHSABS 1.99 01/05/2022    MONOSABS 0.56 01/05/2022    EOSABS 0.19 01/05/2022    BASOSABS 0.06 01/05/2022    AUTOIGNUM 0.02 01/05/2022    NRBC 0.0 01/05/2022        Current Medication List  This medication list has been reviewed with the patient and evaluated for any interactions or necessary modifications/recommendations, and updated to include all prescription medications, OTC medications, and supplements the patient is currently taking.  This list reflects what is contained in the patient's profile, which has also been marked as reviewed to communicate to other providers it is the most up to date version of the patient's current medication therapy.     Current Outpatient Medications:   •  aspirin 81 MG chewable tablet, Chew 81 mg Daily., Disp: , Rfl:   •  atenolol (TENORMIN) 50 MG tablet, Take 50 mg by mouth Daily., Disp: , Rfl:   •  cetirizine (zyrTEC) 10 MG tablet, Take 10 mg by mouth Daily., Disp: , Rfl:   •  levETIRAcetam (KEPPRA) 500 MG tablet, Take 1 tablet by mouth Every 12 (Twelve) Hours., Disp: 180 tablet, Rfl: 4  •  losartan (COZAAR) 50 MG tablet, Take 50 mg by mouth Daily., Disp: , Rfl:   •  lovastatin (MEVACOR) 20 MG tablet, Take 20 mg by mouth 2 (two) times a day., Disp: , Rfl:   •  ondansetron ODT (ZOFRAN-ODT) 4 MG disintegrating tablet, Place 1 tablet on the tongue Every 6 (Six) Hours As Needed for Nausea or Vomiting for up to 12 doses., Disp: 12 tablet, Rfl: 0  •  prochlorperazine (COMPAZINE) 10 MG tablet, Take 1 tablet by mouth Every 8 (Eight) Hours As Needed for Nausea or Vomiting., Disp: 90  tablet, Rfl: 5  •  temozolomide (TEMODAR) 140 MG chemo capsule, Take 1 capsule by mouth with 1 other temozolomide prescription for 320 mg total Daily for 5 days. Take 140 mg + 180 mg capsule on days 1-5 of each cycle., Disp: 5 capsule, Rfl: 3  •  temozolomide (TEMODAR) 180 MG chemo capsule, Take 1 capsule by mouth with 1 other temozolomide prescription for 320 mg total Daily for 5 days. Take 140 mg + 180 mg capsule on days 1-5 of each cycle., Disp: 5 capsule, Rfl: 3    Drug Interactions  None     Adverse Drug Reactions  • Adverse Reactions Experienced: Mild Nausea   • Plan for ADR Management: Not Required     Hospitalizations and Urgent Care Since Last Assessment  • Hospitalizations or Admissions: None    • ED Visits: None   • Urgent Office Visits: None     Adherence and Self-Administration  • Approximate Number of Doses Missed Since Last Assessment: 0   • Ongoing or New Barriers to Patient Adherence and/or Self-Administration: None   • Methods for Supporting Patient Adherence and/or Self-Administration: Patient states he is able to remember to take medication without assistance and encouraged to continue doing so.      Goals of Therapy  • Progress Toward Meeting Patient-Identified Goals of Therapy:  Unchanged  o Patient-Identified Goals  - Consistently take medications as prescribed  - Patient will adhere to medication regimen  - Patient will report any medication side effects to healthcare provider    • Progress Toward Meeting Clinical Goals or Therapeutic Targets: Unchanged  o Clinical Goals or Therapeutic Targets  - Support patient understanding of medication regimen  - Ensure patient knows the pharmacy contact information  - Schedule regular follow-up to monitor the treatment serious adverse events  - Schedule regular follow-up to confirm medication adherence  - Schedule regular follow-up to monitor disease progression or stabilty    Quality of Life Assessment   Quality of Life related to the patient's  specialty therapy was discussed with the patient. The QOL segment of this outreach has been reviewed and updated.   • Quality of Life Score: 5    Reassessment Plan & Follow-Up  1. Pharmacist to continue to perform regular reassessments no more than (6) months from the previous assessment.  2. Care Coordinator to facilitate future refill outreaches, coordinate prescription delivery, and escalate clinical questions to pharmacist.     Additional Plans, Therapy Recommendations or Therapy Problems to Be Addressed: None  Recent Provider Changes:  None    Attestation  I attest that the specialty medication(s) addressed above are appropriate for the patient based on my reassessment.  If the prescribed therapy is at any point deemed not appropriate based on the current or future assessments, a consultation will be initiated with the patient's specialty care provider to determine the best course of action. The revised plan of therapy will be documented along with any additional patient education provided.     .me  Oncology Clinical Pharmacist  2/1/2022  11:54 EST

## 2022-02-01 NOTE — PROGRESS NOTES
Specialty Pharmacy Refill Coordination Note      Name:  Kennedy Reardon  :  1965  Date:  2022         Past Medical History:   Diagnosis Date   • Brain cancer (HCC)    • History of radiation therapy 2020    left temporal lobe of brain   • Hyperlipidemia    • Hypertension    • Medical history reviewed with no changes    • Seizures (HCC)        Past Surgical History:   Procedure Laterality Date   • CRANIOTOMY FOR TUMOR Left 2020    Procedure: CRANIOTOMY FOR TUMOR LEFT;  Surgeon: Gilbert Harrell MD;  Location: [facility];  Service: Neurosurgery;  Laterality: Left;       Social History     Socioeconomic History   • Marital status:    Tobacco Use   • Smoking status: Current Every Day Smoker     Packs/day: 0.50   • Smokeless tobacco: Never Used   Vaping Use   • Vaping Use: Never used   Substance and Sexual Activity   • Alcohol use: No     Comment: few beers every now and then   • Drug use: Defer   • Sexual activity: Defer       Family History   Problem Relation Age of Onset   • Osteoarthritis Mother    • Rheum arthritis Mother    • Hypertension Mother    • Cancer Father    • Osteoporosis Sister    • Osteoarthritis Maternal Grandfather    • Rheum arthritis Maternal Grandfather    • Heart disease Paternal Grandmother    • Cancer Paternal Grandfather    • Heart disease Paternal Grandfather    • Diabetes Paternal Grandfather        No Known Allergies    Current Outpatient Medications   Medication Sig Dispense Refill   • aspirin 81 MG chewable tablet Chew 81 mg Daily.     • atenolol (TENORMIN) 50 MG tablet Take 50 mg by mouth Daily.     • cetirizine (zyrTEC) 10 MG tablet Take 10 mg by mouth Daily.     • levETIRAcetam (KEPPRA) 500 MG tablet Take 1 tablet by mouth Every 12 (Twelve) Hours. 180 tablet 4   • losartan (COZAAR) 50 MG tablet Take 50 mg by mouth Daily.     • lovastatin (MEVACOR) 20 MG tablet Take 20 mg by mouth 2 (two) times a day.     • ondansetron ODT (ZOFRAN-ODT) 4 MG disintegrating  tablet Place 1 tablet on the tongue Every 6 (Six) Hours As Needed for Nausea or Vomiting for up to 12 doses. 12 tablet 0   • prochlorperazine (COMPAZINE) 10 MG tablet Take 1 tablet by mouth Every 8 (Eight) Hours As Needed for Nausea or Vomiting. 90 tablet 5   • temozolomide (TEMODAR) 140 MG chemo capsule Take 1 capsule by mouth with 1 other temozolomide prescription for 320 mg total Daily for 5 days. Take 140 mg + 180 mg capsule on days 1-5 of each cycle. 5 capsule 3   • temozolomide (TEMODAR) 180 MG chemo capsule Take 1 capsule by mouth with 1 other temozolomide prescription for 320 mg total Daily for 5 days. Take 140 mg + 180 mg capsule on days 1-5 of each cycle. 5 capsule 3     No current facility-administered medications for this visit.         ASSESSMENT/PLAN:      Kennedy is a 56 y.o. male contacted today regarding refills of one specialty medication(s).    Reviewed and verified with patient:       Specialty medication(s) and dose(s) confirmed: yes    Refill Questions      Most Recent Value   Changes to allergies? No   Changes to medications? No   New conditions since last clinic visit No   Unplanned office visit, urgent care, ED, or hospital admission in the last 4 weeks  No   How does patient/caregiver feel medication is working? Excellent   Financial problems or insurance changes  No   If yes, describe changes in insurance or financial issues. None   How many doses of your specialty medications were missed in the last 4 weeks? 0   Why were doses missed? NA   Does this patient require a clinical escalation to a pharmacist? No                Medication Adherence    Support network for adherence: family member          Follow-up: 28 day(s)     Michelle Alexander, Pharmacy Technician  Specialty Pharmacy Technician

## 2022-02-02 NOTE — TELEPHONE ENCOUNTER
Caller: Cinthia Reardon    Relationship: Emergency Contact     Best call back number:[phone]    What is the best time to reach you: ANYTIME      What was the call regarding: CINTHIA IS WANTING TO MOVE [facility] FROM Friday TO TOMORROW MORNING     Do you require a callback:  YES

## 2022-06-01 NOTE — PROGRESS NOTES
Specialty Pharmacy Refill Coordination Note      Name:  Kennedy Reardon  :  1965  Date:  2022         Past Medical History:   Diagnosis Date   • Brain cancer (HCC)    • History of radiation therapy 2020    left temporal lobe of brain   • Hyperlipidemia    • Hypertension    • Medical history reviewed with no changes    • Seizures (HCC)        Past Surgical History:   Procedure Laterality Date   • CRANIOTOMY FOR TUMOR Left 2020    Procedure: CRANIOTOMY FOR TUMOR LEFT;  Surgeon: Gilbert Harrell MD;  Location: [facility];  Service: Neurosurgery;  Laterality: Left;       Social History     Socioeconomic History   • Marital status:    Tobacco Use   • Smoking status: Current Every Day Smoker     Packs/day: 0.50   • Smokeless tobacco: Never Used   Vaping Use   • Vaping Use: Never used   Substance and Sexual Activity   • Alcohol use: No     Comment: few beers every now and then   • Drug use: Defer   • Sexual activity: Defer       Family History   Problem Relation Age of Onset   • Osteoarthritis Mother    • Rheum arthritis Mother    • Hypertension Mother    • Cancer Father    • Osteoporosis Sister    • Osteoarthritis Maternal Grandfather    • Rheum arthritis Maternal Grandfather    • Heart disease Paternal Grandmother    • Cancer Paternal Grandfather    • Heart disease Paternal Grandfather    • Diabetes Paternal Grandfather        No Known Allergies    Current Outpatient Medications   Medication Sig Dispense Refill   • aspirin 81 MG chewable tablet Chew 81 mg Daily.     • atenolol (TENORMIN) 50 MG tablet Take 50 mg by mouth Daily.     • cetirizine (zyrTEC) 10 MG tablet Take 10 mg by mouth Daily.     • levETIRAcetam (KEPPRA) 500 MG tablet Take 1 tablet by mouth Every 12 (Twelve) Hours. 180 tablet 4   • losartan (COZAAR) 50 MG tablet Take 50 mg by mouth Daily.     • lovastatin (MEVACOR) 20 MG tablet Take 20 mg by mouth 2 (two) times a day.     • ondansetron ODT (ZOFRAN-ODT) 4 MG disintegrating  tablet Place 1 tablet on the tongue Every 6 (Six) Hours As Needed for Nausea or Vomiting for up to 12 doses. 12 tablet 0   • prochlorperazine (COMPAZINE) 10 MG tablet Take 1 tablet by mouth Every 8 (Eight) Hours As Needed for Nausea or Vomiting. 90 tablet 5   • temozolomide (TEMODAR) 140 MG chemo capsule Take 1 capsule by mouth with 1 other temozolomide prescription for 320 mg total Daily. Take 140 mg + 180 mg capsule on days 1-5 of each cycle. 5 capsule 3   • temozolomide (TEMODAR) 180 MG chemo capsule Take 1 capsule by mouth with 1 other temozolomide prescription for 320 mg total Daily. Take 140 mg + 180 mg capsule on days 1-5 of each cycle. 5 capsule 3     No current facility-administered medications for this visit.         ASSESSMENT/PLAN:      Kennedy is a 57 y.o. male contacted today regarding refills of  Temodar 140mg and Temodar 180mg specialty medication(s).    Reviewed and verified with patient:         Specialty medication(s) and dose(s) confirmed: yes    Refill Questions    Flowsheet Row Most Recent Value   Changes to allergies? No   Changes to medications? No   New conditions since last clinic visit No   Unplanned office visit, urgent care, ED, or hospital admission in the last 4 weeks  No   How does patient/caregiver feel medication is working? Very good   Financial problems or insurance changes  No   If yes, describe changes in insurance or financial issues. none   Since the previous refill, were any specialty medication doses or scheduled injections missed or delayed?  No   If yes, please provide the amount 0   Why were doses missed? n/a   Does this patient require a clinical escalation to a pharmacist? No                Medication Adherence    Support network for adherence: family member          Follow-up: 5 day(s)     Melba Tuttle, Pharmacy Technician  Specialty Pharmacy Technician

## 2022-06-02 NOTE — PROGRESS NOTES
Patient: Kennedy Reardon  : 1965    Primary Care Provider: Feliberto Moore MD    Requesting Provider: As above      Chief Complaint: glioma (F/u MRI)      History of Present Illness: This is a 57 y.o. male who underwent resection of a left temporal glioblastoma by Dr. Harrell in 2020.  The patient had subsequent radiation therapy and chemotherapy.  He is continue to take Temodar and is followed by an oncologist here in [place].  He comes in today for his 6-month follow-up.  Since his last visit, patient's been doing well.  He denies any new issues with headaches vision changes or speech issues.  He has no new focal weakness in his arms or legs.  Overall, he has been doing well.    PMHX  Allergies:  No Known Allergies  Medications    Current Outpatient Medications:   •  aspirin 81 MG chewable tablet, Chew 81 mg Daily., Disp: , Rfl:   •  atenolol (TENORMIN) 50 MG tablet, Take 50 mg by mouth Daily., Disp: , Rfl:   •  levETIRAcetam (KEPPRA) 500 MG tablet, Take 1 tablet by mouth Every 12 (Twelve) Hours., Disp: 180 tablet, Rfl: 4  •  losartan (COZAAR) 50 MG tablet, Take 50 mg by mouth Daily., Disp: , Rfl:   •  lovastatin (MEVACOR) 20 MG tablet, Take 20 mg by mouth 2 (two) times a day., Disp: , Rfl:   •  ondansetron ODT (ZOFRAN-ODT) 4 MG disintegrating tablet, Place 1 tablet on the tongue Every 6 (Six) Hours As Needed for Nausea or Vomiting for up to 12 doses., Disp: 12 tablet, Rfl: 0  •  prochlorperazine (COMPAZINE) 10 MG tablet, Take 1 tablet by mouth Every 8 (Eight) Hours As Needed for Nausea or Vomiting., Disp: 90 tablet, Rfl: 5  •  temozolomide (TEMODAR) 140 MG chemo capsule, Take 1 capsule by mouth with 1 other temozolomide prescription for 320 mg total Daily. Take 140 mg + 180 mg capsule on days 1-5 of each cycle., Disp: 5 capsule, Rfl: 3  •  temozolomide (TEMODAR) 180 MG chemo capsule, Take 1 capsule by mouth with 1 other temozolomide prescription for 320 mg total Daily. Take 140 mg + 180 mg  capsule on days 1-5 of each cycle., Disp: 5 capsule, Rfl: 3  •  cetirizine (zyrTEC) 10 MG tablet, TAKE 1 TABLET BY MOUTH ONCE DAILY AS NEEDED, Disp: , Rfl:   No current facility-administered medications for this visit.  Past Medical History:  Past Medical History:   Diagnosis Date   • Brain cancer (HCC)    • History of radiation therapy 12/02/2020    left temporal lobe of brain   • Hyperlipidemia    • Hypertension    • Medical history reviewed with no changes    • Seizures (HCC)      Past Surgical History:  Past Surgical History:   Procedure Laterality Date   • CRANIOTOMY FOR TUMOR Left 8/28/2020    Procedure: CRANIOTOMY FOR TUMOR LEFT;  Surgeon: Gilbert Harrell MD;  Location: [facility];  Service: Neurosurgery;  Laterality: Left;     Social Hx:  Social History     Tobacco Use   • Smoking status: Current Every Day Smoker     Packs/day: 0.50   • Smokeless tobacco: Never Used   Vaping Use   • Vaping Use: Never used   Substance Use Topics   • Alcohol use: No     Comment: few beers every now and then   • Drug use: Defer     Family Hx:  Family History   Problem Relation Age of Onset   • Osteoarthritis Mother    • Rheum arthritis Mother    • Hypertension Mother    • Cancer Father    • Osteoporosis Sister    • Osteoarthritis Maternal Grandfather    • Rheum arthritis Maternal Grandfather    • Heart disease Paternal Grandmother    • Cancer Paternal Grandfather    • Heart disease Paternal Grandfather    • Diabetes Paternal Grandfather      Review of Systems:        Review of Systems   Constitutional: Negative for activity change, appetite change, chills, diaphoresis, fatigue, fever and unexpected weight change.   HENT: Negative for congestion, dental problem, drooling, ear discharge, ear pain, facial swelling, hearing loss, mouth sores, nosebleeds, postnasal drip, rhinorrhea, sinus pressure, sneezing, sore throat, tinnitus, trouble swallowing and voice change.    Eyes: Negative for photophobia, pain, discharge, redness,  "itching and visual disturbance.   Respiratory: Negative for apnea, cough, choking, chest tightness, shortness of breath, wheezing and stridor.    Cardiovascular: Negative for chest pain, palpitations and leg swelling.   Gastrointestinal: Negative for abdominal distention, abdominal pain, anal bleeding, blood in stool, constipation, diarrhea, nausea, rectal pain and vomiting.   Endocrine: Negative for cold intolerance, heat intolerance, polydipsia, polyphagia and polyuria.   Genitourinary: Negative for decreased urine volume, difficulty urinating, dysuria, enuresis, flank pain, frequency, genital sores, hematuria and urgency.   Musculoskeletal: Negative for arthralgias, back pain, gait problem, joint swelling, myalgias, neck pain and neck stiffness.   Skin: Negative for color change, pallor, rash and wound.   Allergic/Immunologic: Negative for environmental allergies, food allergies and immunocompromised state.   Neurological: Negative for dizziness, tremors, seizures, syncope, facial asymmetry, speech difficulty, weakness, light-headedness, numbness and headaches.   Hematological: Negative for adenopathy. Does not bruise/bleed easily.   Psychiatric/Behavioral: Negative for agitation, behavioral problems, confusion, decreased concentration, dysphoric mood, hallucinations, self-injury, sleep disturbance and suicidal ideas. The patient is not nervous/anxious and is not hyperactive.    All other systems reviewed and are negative.       Physical Exam:   /81   Ht 175.3 cm (69\")   Wt 108 kg (238 lb)   BMI 35.15 kg/m²   Awake, alert and oriented x 3  Speech f/c  Opens eyes spont  Pupils 3 mm rx bilaterally  Extraocular muscles intact bilaterally  Normal sensation to light touch in all 3 distributions of CN V bilaterally  Face symmetric bilaterally  Tongue midline  5/5 in all 4 ext  No PD    Diagnostic Studies:  All neurological imaging studies were independently reviewed unless stated " otherwise    Assessment/Plan:  This is a 57 y.o. male who is status post resection of a left temporal glioblastoma by Dr. Harrell in August 2020.  The patient underwent postoperative chemo and radiation therapy.  He continues to take Temodar and is followed by an oncologist here in [place].  He comes in today for 6-month follow-up.  Overall, the patient is doing well.  He has no new focal neurological issues.  His new brain MRI shows decrease in size of the enhancing lesion of the left temporal lobe with reduced edema.  Clinically, the patient's been doing well.  He is scheduled to see his oncologist next month.  At this time, there is no role for any further surgical intervention given the decrease in size of the lesion.  I will defer to his oncologist for further management of his chemotherapy.  We will plan of the patient return to clinic in 6 months with a new brain MRI.    Diagnoses and all orders for this visit:    1. Glioblastoma (HCC) (Primary)        Kevin Charles MD  06/02/22  10:00 EDT

## 2022-07-05 NOTE — PROGRESS NOTES
Name:  Kennedy Reardon  :  1965  Date:  2022     REFERRING PHYSICIAN  Gilbert Harrell MD    PRIMARY CARE PHYSICIAN  Feliberto Moore MD    REASON FOR FOLLOWUP  1. Glioma (HCC)      CHIEF COMPLAINT  None.    Dear Dr. Charles,    HISTORY OF PRESENT ILLNESS:   I saw Mr. Reardon in follow up today in our medical oncology clinic. As you are aware, he is a pleasant, 57 y.o., white male with a history of minimal medical problems who was in his usual state of health until late 2020 when he suffered a couple of seizures a few days apart. He presented to our ED following the second episode and was med-flighted to [facility] due to concerns that he was suffering a stroke. Ultimately, an MRI of the brain identified a cystic mass in the left temporoparietal region; and he was taken to the OR on 2020. The final, surgical pathology was consistent with glioblastoma multiforme. He recovered uneventfully from his craniotomy, and he was subsequently referred to our clinic for assistance with his management closer to his home in [place], [place].     INTERIM HISTORY:  Mr. Reardon presents today for follow up once again accompanied by his wife. He completed concomitant, daily PO Temodar and XRT on 2020 and tolerated this regimen well, with mild nausea as his only reported noticeable side effects. He was subsequently agreeable to transitioning to qmonthly, PO Temodar alone and began this regimen in mid-2021. He has now completed a total of ~eighteen (18) cycles and continues to tolerate this treatment well, with mild, but very tolerable, nausea for the first couple of days of each cycle (he has still not even had to take any antiemetics). He once again denies any specific complaints.    Past Medical History:   Diagnosis Date   • Brain cancer (HCC)    • History of radiation therapy 2020    left temporal lobe of brain   • Hyperlipidemia    • Hypertension    • Medical history  reviewed with no changes    • Seizures (HCC)        Past Surgical History:   Procedure Laterality Date   • CRANIOTOMY FOR TUMOR Left 8/28/2020    Procedure: CRANIOTOMY FOR TUMOR LEFT;  Surgeon: Gilbert Harrell MD;  Location: [facility];  Service: Neurosurgery;  Laterality: Left;       Social History     Socioeconomic History   • Marital status:    Tobacco Use   • Smoking status: Current Every Day Smoker     Packs/day: 0.50   • Smokeless tobacco: Never Used   Vaping Use   • Vaping Use: Never used   Substance and Sexual Activity   • Alcohol use: No     Comment: few beers every now and then   • Drug use: Defer   • Sexual activity: Defer       Family History   Problem Relation Age of Onset   • Osteoarthritis Mother    • Rheum arthritis Mother    • Hypertension Mother    • Cancer Father    • Osteoporosis Sister    • Osteoarthritis Maternal Grandfather    • Rheum arthritis Maternal Grandfather    • Heart disease Paternal Grandmother    • Cancer Paternal Grandfather    • Heart disease Paternal Grandfather    • Diabetes Paternal Grandfather        No Known Allergies     .ECOG score: 0               Current Outpatient Medications   Medication Sig Dispense Refill   • aspirin 81 MG chewable tablet Chew 81 mg Daily.     • atenolol (TENORMIN) 50 MG tablet Take 50 mg by mouth Daily.     • cetirizine (zyrTEC) 10 MG tablet TAKE 1 TABLET BY MOUTH ONCE DAILY AS NEEDED     • levETIRAcetam (KEPPRA) 500 MG tablet Take 1 tablet by mouth Every 12 (Twelve) Hours. 180 tablet 4   • losartan (COZAAR) 50 MG tablet Take 50 mg by mouth Daily.     • lovastatin (MEVACOR) 20 MG tablet Take 20 mg by mouth 2 (two) times a day.     • ondansetron ODT (ZOFRAN-ODT) 4 MG disintegrating tablet Place 1 tablet on the tongue Every 6 (Six) Hours As Needed for Nausea or Vomiting for up to 12 doses. 12 tablet 0   • prochlorperazine (COMPAZINE) 10 MG tablet Take 1 tablet by mouth Every 8 (Eight) Hours As Needed for Nausea or Vomiting. 90 tablet 5   •  "temozolomide (TEMODAR) 140 MG chemo capsule Take 1 capsule by mouth with 1 other temozolomide prescription for 320 mg total Daily. Take 140 mg + 180 mg capsule on days 1-5 of each cycle. 5 capsule 3   • temozolomide (TEMODAR) 180 MG chemo capsule Take 1 capsule by mouth with 1 other temozolomide prescription for 320 mg total Daily. Take 140 mg + 180 mg capsule on days 1-5 of each cycle. 5 capsule 3     No current facility-administered medications for this visit.     REVIEW OF SYSTEMS  CONSTITUTIONAL:  No fever, chills or night sweats.  EYES:  No blurry vision, diplopia or other vision changes. Improved photosensitivity, still no longer wearing sunglasses indoors (as he was doing at the time of his initial consultation).  ENT:  No hearing loss, nosebleeds or sore throat.  CARDIOVASCULAR:  No palpitations, arrhythmia, syncopal episodes or edema.  PULMONARY:  No hemoptysis, wheezing, chronic cough or shortness of breath.  GASTROINTESTINAL:  No constipation or diarrhea. No abdominal pain. Mild, nausea for the first couple of days after starting each cycle of qmonthly, PO Temodar, as per the HPI above.  GENITOURINARY:  No hematuria, kidney stones or frequent urination.  MUSCULOSKELETAL:  No joint or back pains.  INTEGUMENTARY: No rashes or pruritus.  ENDOCRINE:  No excessive thirst or hot flashes.  HEMATOLOGIC:  No history of free bleeding, spontaneous bleeding or clotting.  IMMUNOLOGIC:  No allergies or frequent infections.  NEUROLOGIC: As per the HPI above.  PSYCHIATRIC:  No anxiety or depression.    PHYSICAL EXAMINATION  /84   Pulse 74   Temp 98 °F (36.7 °C)   Resp 18   Ht 175.3 cm (69\")   Wt 109 kg (240 lb)   SpO2 96%   BMI 35.44 kg/m²     Pain Score:  Pain Score    22 1110   PainSc: 0-No pain     PHQ-Score Total:  PHQ-9 Total Score:      ECO  GENERAL:  A well-developed, well-nourished, white male in no acute distress.  HEENT:  Pupils equally round reactive to light. Extraocular muscles " intact.  CARDIOVASCULAR:  Regular rate and rhythm. No murmurs, gallops or rubs.  LUNGS:  Clear to auscultation bilaterally.  ABDOMEN:  Soft, nontender, nondistended with positive bowel sounds.  EXTREMITIES:  No clubbing, cyanosis or edema bilaterally.  SKIN:  No rashes or petechiae.  NEURO:  No focal deficits.  PSYCH:  Alert and oriented x3.    The physical exam is again unchanged from recent priors.    LABORATORY  Lab Results   Component Value Date    WBC 6.96 07/05/2022    HGB 15.0 07/05/2022    HCT 43.6 07/05/2022    MCV 92.6 07/05/2022     07/05/2022    NEUTROABS 4.34 07/05/2022       Lab Results   Component Value Date     (L) 06/02/2022    K 5.1 06/02/2022    CL 99 06/02/2022    CO2 27.8 06/02/2022    BUN 8 06/02/2022    CREATININE 0.91 06/02/2022    GLUCOSE 114 (H) 06/02/2022    CALCIUM 9.5 06/02/2022    AST 21 06/02/2022    ALT 32 06/02/2022    ALKPHOS 101 06/02/2022    BILITOT 0.3 06/02/2022    PROTEINTOT 7.3 06/02/2022    ALBUMIN 4.39 06/02/2022     CBC (07/05/2022): WBCs: 6.96; HgB: 15.0; Hct: 43.6; platelets: 212  CBC (06/02/2022): WBCs: 6.60; HgB: 15.1; Hct: 44.1; platelets: 202  CBC (01/05/2022): WBCs: 7.33; HgB: 14.7; Hct: 42.4; platelets: 208  CBC (10/06/2021): WBCs: 6.76; HgB: 14.4; Hct: 43.2; platelets: 213  CBC (07/07/2021): WBCs: 6.48; HgB: 14.3; Hct: 42.6; platelets: 204  CBC (05/05/2021): WBCs: 7.1; HgB: 15.5; Hct: 46.4; platelets: 208  CBC (03/30/2021): WBCs: 7.4; HgB: 14.9; Hct: 44.1; platelets: 240  CBC (03/03/2021): WBCs: 7.4; HgB: 15.1; Hct: 45.5; platelets: 244  CBC (02/03/2021): WBCs: 7.0; HgB: 15.3; Hct: 45.3; platelets: 244  CBC (01/04/2021): WBCs: 7.4; HgB: 15.0; Hct: 45.5; platelets: 182  CBC (11/25/2020): WBCs: 7.8; HgB: 15.0; Hct: 45.0; platelets: 258    IMAGING  MRI brain with contrast (08/27/2020):  Impression: Large cystic mass identified in the left temporoparietal region with surgical markers in place for surgical planning. The largest axial dimension of the  nodular component is 2.5 x 1.6 cm.    MRI brain with and without contrast (08/31/2020):  Impression: Postsurgical changes left temporal craniotomy and resection with expected early postoperative changes including dural enhancement mildly prominent along the left inferolateral margin of the resection site with attention on followup otherwise, no residual soft tissue nodular enhancement or mass-occupying focus of enhancement. No midline shift or hydrocephalus.    MRI brain with and without contrast (10/01/2020):  Impression: T2 signal abnormality surrounding a left temporal mass measuring approximately 1.8 x 2.9 cm that shows predominantly peripheral enhancement. Some postsurgical changes noted overlying this region, but no prior examination is available for comparison.    MRI brain without contrast (12/03/2020):  Impression: The left temporal lobe lesion is slightly smaller now with several cystic areas in the lesion. There is less edema than on the previous exam in 10/2020.    MRI brain with and without contrast (02/04/2021):  Impression: [Remains stable per neurosurgery interpretation.]    MRI brain with and without contrast (04/01/2021):  Impression:  1) Based on retrospective remeasurement, no interval change in size of enhancing mass left temporal lobe with cystic components and no change in the degree of peritumoral signal abnormality/vasogenic edema. Today's measurements are 3.0 x 2.4 cm.  2) Localized mass effect but no hydrocephalus or midline shift.  3) No new enhancing brain lesions identified.    MRI brain with and without contrast (04/29/2021):  Impression: Again there is a peripherally enhancing mass in the right temporal lobe again measuring about 2.1 x 2.6 cm. Again there is surrounding vasogenic edema.    MRI brain with and without contrast (06/25/2021):  Impression:  1) Contrast-enhancing mass in the left temporal lobe very similar to the previous exam. Surrounding vasogenic edema again noted.  2)  No new contrast-enhancing lesions.    MRI brain with and without contrast (09/16/2021, compared to 06/25/2021):  Impression:  1) Given differences in technique, there is no interval change in the predominantly left temporal region enhancing process subjacent to the craniotomy site with no change in the degree of vasogenic edema or peritumoral signal abnormality. Lesion is approximately 2.5 x 2.1 cm and was previously 2.6 x 2.2 cm.  2) There remains no evidence of midline shift or hydrocephalus.  3) No new areas of pathologic enhancement identified. No acute intracranial findings are noted.    MRI brain with and without contrast (12/02/2021, compared to 09/16/2021):  Impression: Improvement in the brain. The enhancing glioma in the temporal lobe is slightly smaller in size with less surrounding edema in the brain parenchyma.    MRI brain with and without contrast (06/02/2022, compared to 12/02/2021):  Impression: Lesion persists in the left temporal lobe. It shows less surrounding edema and enhancement than on the most recent exam from December. No new lesions have developed.    PATHOLOGY  Temporal lobe mass (08/28/2020):  Glioblastoma multiforme. IDH1/IDH2 mutation not detected. 1p/19q co-deletion not detected. MGMT gene promotor methylation: Detected.    IMPRESSION AND PLAN  Mr. Reardon is a 57 y.o., white male with:  Glioblastoma multiforme: Initially diagnosed in late August 2020 following a couple of seizure events a few days apart and status post craniotomy with resection of a left, temporoparietal region, cystic mass on 08/27/2020. The final surgical pathology results are summarized above, and he recovered well from this procedure. I have had multiple, long discussions with the patient (+/- his wife) since the time of his initial consultation in our clinic (on 09/30/2020) regarding this diagnosis and its prognosis, reiterating much of what they were previously told by [facility] neurosurgery. They remain  aware that his diagnosis is, unfortunately, ultimately very poor; however, this malignancy was, and remains, potentially treatable and sustained remissions are possible (and he has been doing overall extremely well for quite some time now). Adjuvant, concomitant chemotherapy and radiation was recommended. He began a ~six-week course of this regimen (with temozolomide 75 mg/m2 daily with XRT; patient dose: 160 mg) by mid-October 2020 and completed it by early December 2020, overall tolerating this regimen well, with some mild, manageable nausea as his only noticeable side effect. He had a repeat MRI with [facility] neurosurgery on 12/03/2020, following the conclusion of his chemo/XRT, which showed overall improvement (summarized above). Subsequently, he was felt to be a good candidate to transition to adjuvant, qmonthly PO Temodar alone (150 mg/m2 on days 1-5 of 28-day cycles; patient dose: 320 mg). He began this qmonthly therapy in mid-January 2021. He has now completed a total of eighteen (18) cycles, and he continues to tolerate this regimen overall well (with some mild, manageable nausea for the first couple of days as his only noticeable side effect; he has still not had to take any antiemetics). Meanwhile, the most recent repeat MRI of the brain (performed on 06/02/2022 and summarized above) was improved compared to priors. We will proceed with this current, adjuvant treatment plan (cycle #19 scheduled to start next week). We will continue to check CBCs and CMPs on a qmonthly basis, just prior to the start of each cycle of Temodar. He will follow up with neurosurgery again in December with another repeat MRI of the brain, as previously planned. We will see him back in our clinic in ~six months, shortly after these (the MRI and NS) appointments and just prior to the start of the potential twenty-fifth cycle of qmonthly Temodar, with a CBC and CMP. The patient and his wife were in agreement with these  plans.    It is a pleasure to participate in Mr. Reardon's care. Please do not hesitate to call with any questions or concerns that you may have.    A total of 30 minutes were spent coordinating this patient’s care in clinic today; more than 50% of this time was face-to-face with the patient and his wife, reviewing his interim medical history, discussing the results of the most recent labwork and June's repeat MRI of the brain and counseling on the current treatment and followup plan. All questions were answered to their satisfaction.    FOLLOW UP  Proceed with adjuvant, qmonthly, PO Temodar alone, as planned (~19th cycle scheduled to start shortly). With neurosurgery with a repeat MRI of the brain in December, as previously planned. Return to our clinic in 6 months, shortly after the next MRI and NS appointments and just prior to the start of the potential 25th cycle of qmonthly Temodar, respectively, with a CBC and CMP. Continued, Qmonthly CBCs and CMPs between now and then (just prior to the start of each cycle of Temodar).             This document was electronically signed by KEON Casey MD July 5, 2022 11:19 EDT      CC: MD Zena Zavaleta MD James W. Killian, MD

## 2022-07-22 NOTE — PROGRESS NOTES
Specialty Pharmacy Patient Management Program  Oncology Reassessment     Kennedy Reardon is a 57 y.o. male with glioblastoma multiforme seen by an Oncology provider and enrolled in the Oncology Patient Management program offered by [facility].  A follow-up outreach was conducted, including assessment of continued therapy appropriateness, medication adherence, and side effect incidence and management for Temodar.       Relevant Past Medical History and Comorbidities  Relevant medical history and concomitant health conditions were discussed with the patient. The patient's chart has been reviewed for relevant past medical history and comorbid health conditions and updated as necessary.   Past Medical History:   Diagnosis Date   • Brain cancer (HCC)    • History of radiation therapy 12/02/2020    left temporal lobe of brain   • Hyperlipidemia    • Hypertension    • Medical history reviewed with no changes    • Seizures (HCC)      Social History     Socioeconomic History   • Marital status:    Tobacco Use   • Smoking status: Current Every Day Smoker     Packs/day: 0.50   • Smokeless tobacco: Never Used   Vaping Use   • Vaping Use: Never used   Substance and Sexual Activity   • Alcohol use: No     Comment: few beers every now and then   • Drug use: Defer   • Sexual activity: Defer       Allergies  Known allergies and reactions were discussed with the patient. The patient's chart has been reviewed for allergy information and updated as necessary.   Patient has no known allergies.    Relevant Laboratory Values  Lab Results   Component Value Date    GLUCOSE 118 (H) 07/05/2022    CALCIUM 9.4 07/05/2022     07/05/2022    K 5.1 07/05/2022    CO2 26.8 07/05/2022     07/05/2022    BUN 7 07/05/2022    CREATININE 0.83 07/05/2022    EGFRIFNONA 82 02/03/2022    BCR 8.4 07/05/2022    ANIONGAP 9.2 07/05/2022     Lab Results   Component Value Date    WBC 6.96 07/05/2022    RBC 4.71 07/05/2022     HGB 15.0 07/05/2022    HCT 43.6 07/05/2022    MCV 92.6 07/05/2022    MCH 31.8 07/05/2022    MCHC 34.4 07/05/2022    RDW 12.3 07/05/2022    RDWSD 42.0 07/05/2022    MPV 9.6 07/05/2022     07/05/2022    NEUTRORELPCT 62.3 07/05/2022    LYMPHORELPCT 24.9 07/05/2022    MONORELPCT 9.2 07/05/2022    EOSRELPCT 2.6 07/05/2022    BASORELPCT 0.7 07/05/2022    AUTOIGPER 0.3 07/05/2022    NEUTROABS 4.34 07/05/2022    LYMPHSABS 1.73 07/05/2022    MONOSABS 0.64 07/05/2022    EOSABS 0.18 07/05/2022    BASOSABS 0.05 07/05/2022    AUTOIGNUM 0.02 07/05/2022    NRBC 0.0 07/05/2022        Current Medication List  This medication list has been reviewed with the patient and evaluated for any interactions or necessary modifications/recommendations, and updated to include all prescription medications, OTC medications, and supplements the patient is currently taking.  This list reflects what is contained in the patient's profile, which has also been marked as reviewed to communicate to other providers it is the most up to date version of the patient's current medication therapy.     Current Outpatient Medications:   •  temozolomide (TEMODAR) 140 MG chemo capsule, Take 1 capsule by mouth with 1 other temozolomide prescription for 320 mg total Daily. Take 140 mg + 180 mg capsule on days 1-5 of each cycle., Disp: 5 capsule, Rfl: 3  •  temozolomide (TEMODAR) 180 MG chemo capsule, Take 1 capsule by mouth with 1 other temozolomide prescription for 320 mg total Daily. Take 140 mg + 180 mg capsule on days 1-5 of each cycle., Disp: 5 capsule, Rfl: 3  •  aspirin 81 MG chewable tablet, Chew 81 mg Daily., Disp: , Rfl:   •  atenolol (TENORMIN) 50 MG tablet, Take 50 mg by mouth Daily., Disp: , Rfl:   •  cetirizine (zyrTEC) 10 MG tablet, TAKE 1 TABLET BY MOUTH ONCE DAILY AS NEEDED, Disp: , Rfl:   •  levETIRAcetam (KEPPRA) 500 MG tablet, Take 1 tablet by mouth Every 12 (Twelve) Hours., Disp: 180 tablet, Rfl: 4  •  losartan (COZAAR) 50 MG tablet, Take  50 mg by mouth Daily., Disp: , Rfl:   •  lovastatin (MEVACOR) 20 MG tablet, Take 20 mg by mouth 2 (two) times a day., Disp: , Rfl:   •  ondansetron ODT (ZOFRAN-ODT) 4 MG disintegrating tablet, Place 1 tablet on the tongue Every 6 (Six) Hours As Needed for Nausea or Vomiting for up to 12 doses., Disp: 12 tablet, Rfl: 0  •  prochlorperazine (COMPAZINE) 10 MG tablet, Take 1 tablet by mouth Every 8 (Eight) Hours As Needed for Nausea or Vomiting., Disp: 90 tablet, Rfl: 5    Drug Interactions  None     Adverse Drug Reactions  • Adverse Reactions Experienced: None   • Plan for ADR Management: Not required (no ADEs reported).    Hospitalizations and Urgent Care Since Last Assessment  • Hospitalizations or Admissions: 0    • ED Visits: 0   • Urgent Office Visits: 0     Adherence and Self-Administration  • Approximate Number of Doses Missed Since Last Assessment: 0   • Ongoing or New Barriers to Patient Adherence and/or Self-Administration: None.   • Methods for Supporting Patient Adherence and/or Self-Administration: Provided direct patient education. Pharmacist to continue providing clinical assessments every 6 months. Care coordinator to continue providing once-monthly adherence check-ins and refill coordinations.      Goals of Therapy  • Progress Toward Meeting Patient-Identified Goals of Therapy:  On-track  o Patient-Identified Goals  - Consistently take medications as prescribed  - Patient will adhere to medication regimen  - Patient will report any medication side effects to healthcare provider    • Progress Toward Meeting Clinical Goals or Therapeutic Targets: On-track  o Clinical Goals or Therapeutic Targets  - Support patient understanding of medication regimen  - Ensure patient knows the pharmacy contact information  - Schedule regular follow-up to monitor the treatment serious adverse events  - Schedule regular follow-up to confirm medication adherence  - Schedule regular follow-up to monitor disease progression  or stabilty    Quality of Life Change Assessment   Quality of Life related to the patient's specialty therapy was discussed with the patient. The QOL segment of this outreach has been reviewed and updated.   • Quality of Life Score Change: 5    Reassessment Plan & Follow-Up  1. Pharmacist to continue to perform regular reassessments no more than (6) months from the previous assessment.  2. Care Coordinator to facilitate future refill outreaches, coordinate prescription delivery, and escalate clinical questions to pharmacist.     Additional Plans, Therapy Recommendations or Therapy Problems to Be Addressed: None at this time.  Recent Provider Changes:  None.    Attestation  I attest that the specialty medication(s) addressed above are appropriate for the patient based on my reassessment.  If the prescribed therapy is at any point deemed not appropriate based on the current or future assessments, a consultation will be initiated with the patient's specialty care provider to determine the best course of action. The revised plan of therapy will be documented along with any additional patient education provided.     Prieto Parekh PharmD  Oncology Clinical Pharmacist  7/22/2022  13:39 EDT

## 2022-09-22 NOTE — PROGRESS NOTES
"NEUROSURGERY PROGRESS NOTE    Chief Complaint: Left temporal GBM    Subjective: This is a 57-year-old male who underwent resection of the left temporal glioblastoma by Dr. Harrell in August 2020.  He separately underwent adjuvant chemo and radiation therapy.  The patient is followed by Dr. Casey in [place] and has been maintained on Temodar.  Patient comes in with several weeks of progressively worsening speech and right-sided weakness.  He underwent a brain MRI which showed extensive progression of his GBM.    Objective    Vital Signs: Blood pressure 128/70, temperature 97.3 °F (36.3 °C), temperature source Infrared, height 175.3 cm (69\"), weight 102 kg (225 lb 9.6 oz).    Physical Exam  Awake, alert.  Has good speech comprehension but has significant word finding difficulties  Opens eyes spont  Pupils 3 mm rx bilat  Extraocular muscles intact bilaterally  Face symmetric bilaterally  Tongue midline  5/5 in his left upper and left lower extremity.  He is diffusely 4 to 4+ in his right upper and right lower extremity    Current Medications:   Current Outpatient Medications:   •  aspirin 81 MG chewable tablet, Chew 81 mg Daily., Disp: , Rfl:   •  atenolol (TENORMIN) 50 MG tablet, Take 50 mg by mouth Daily., Disp: , Rfl:   •  cetirizine (zyrTEC) 10 MG tablet, TAKE 1 TABLET BY MOUTH ONCE DAILY AS NEEDED, Disp: , Rfl:   •  levETIRAcetam (KEPPRA) 500 MG tablet, Take 1 tablet by mouth Every 12 (Twelve) Hours., Disp: 180 tablet, Rfl: 4  •  losartan (COZAAR) 50 MG tablet, Take 50 mg by mouth Daily., Disp: , Rfl:   •  lovastatin (MEVACOR) 20 MG tablet, Take 20 mg by mouth 2 (two) times a day., Disp: , Rfl:   •  ondansetron ODT (ZOFRAN-ODT) 4 MG disintegrating tablet, Place 1 tablet on the tongue Every 6 (Six) Hours As Needed for Nausea or Vomiting for up to 12 doses., Disp: 12 tablet, Rfl: 0  •  prochlorperazine (COMPAZINE) 10 MG tablet, Take 1 tablet by mouth Every 8 (Eight) Hours As Needed for Nausea or Vomiting., Disp: 90 " tablet, Rfl: 5  •  temozolomide (TEMODAR) 140 MG chemo capsule, Take 1 capsule by mouth with 1 other temozolomide prescription for 320 mg total Daily. Take 140 mg + 180 mg capsule on days 1-5 of each cycle., Disp: 5 capsule, Rfl: 3  •  temozolomide (TEMODAR) 180 MG chemo capsule, Take 1 capsule by mouth with 1 other temozolomide prescription for 320 mg total Daily. Take 140 mg + 180 mg capsule on days 1-5 of each cycle., Disp: 5 capsule, Rfl: 3     Laboratory Results:                              Brief Urine Lab Results     None        Microbiology Results (last 10 days)     ** No results found for the last 240 hours. **          Diagnostic Imaging: I reviewed and independently interpreted the new imaging.     Assessment/Plan:  This is a 57-year-old male who underwent resection of left temporal GBM by Dr. Harrell in August 2020.  Unfortunately, the patient has had significant progression of his disease and is very symptomatic.  He has new multiple satellite lesions which extend into the speech and motor areas of the left hemisphere.  I explained the patient that surgical resection of these lesions is not a good option as it would result in right-sided plegia and aphasia.  Moreover, I do not think it would result in any increased length of survival.  We are going to arrange patient follow-up with Dr. Casey very soon in [place] to discuss any potential other chemotherapy options, but I did explain to the patient and his family that this is a very poor prognosis.  Due to the fact that there is no role for any further surgical intervention, we will not schedule any further follow-up with me.    Diagnoses and all orders for this visit:    1. Glioblastoma (HCC) (Primary)        Kevin Charles MD  09/22/22  11:38 EDT

## 2022-09-22 NOTE — PROGRESS NOTES
Name:  Kennedy Reardon  :  1965  Date:  2022     REFERRING PHYSICIAN  Gilbert Harrell MD    PRIMARY CARE PHYSICIAN  Feliberto Moore MD    REASON FOR FOLLOWUP  1. Glioma (HCC)    2. 5 X 3.8cm L temporal glioma s/p crani and excision 2020      CHIEF COMPLAINT  Reprogressive aphasia and mild balance issues over the course of the past ~two to three weeks.    Dear Dr. Charles,    HISTORY OF PRESENT ILLNESS:   I saw Mr. Reardon in follow up today in our medical oncology clinic. As you are aware, he is a pleasant, 57 y.o., white male with a history of minimal medical problems who was in his usual state of health until late 2020 when he suffered a couple of seizures a few days apart. He presented to our ED following the second episode and was med-flighted to [facility] due to concerns that he was suffering a stroke. Ultimately, an MRI of the brain identified a cystic mass in the left temporoparietal region; and he was taken to the OR on 2020. The final, surgical pathology was consistent with glioblastoma multiforme. He recovered uneventfully from his craniotomy, and he was subsequently referred to our clinic for assistance with his management closer to his home in [place], [place].     INTERIM HISTORY:  Mr. Reardon presents today for follow up once again accompanied by his wife and, this time, his mother as well. He completed concomitant, daily PO Temodar and XRT on 2020 and tolerated this regimen well, with mild nausea as his only reported noticeable side effects. He was subsequently agreeable to transitioning to qmonthly, PO Temodar alone and began this regimen in mid-2021. He has completed a total of nineteen (19) cycles and continues to tolerate this treatment well. Unfortunately, while he had overall been doing extremely well (for nearly two years) post diagnosis, in recent weeks, he has developed recurrent aphasia and balance issues. He underwent a repeat MRI  of the brain and was reevaluated by neurosurgery earlier today; and, unfortunately, he now has marked, recurrent/reprogressive disease.    Past Medical History:   Diagnosis Date   • Brain cancer (HCC)    • History of radiation therapy 12/02/2020    left temporal lobe of brain   • Hyperlipidemia    • Hypertension    • Medical history reviewed with no changes    • Seizures (HCC)        Past Surgical History:   Procedure Laterality Date   • CRANIOTOMY FOR TUMOR Left 8/28/2020    Procedure: CRANIOTOMY FOR TUMOR LEFT;  Surgeon: Gilbert Harrell MD;  Location: [facility];  Service: Neurosurgery;  Laterality: Left;       Social History     Socioeconomic History   • Marital status:    Tobacco Use   • Smoking status: Current Every Day Smoker     Packs/day: 0.50   • Smokeless tobacco: Never Used   Vaping Use   • Vaping Use: Never used   Substance and Sexual Activity   • Alcohol use: No     Comment: few beers every now and then   • Drug use: Defer   • Sexual activity: Defer       Family History   Problem Relation Age of Onset   • Osteoarthritis Mother    • Rheum arthritis Mother    • Hypertension Mother    • Cancer Father    • Osteoporosis Sister    • Osteoarthritis Maternal Grandfather    • Rheum arthritis Maternal Grandfather    • Heart disease Paternal Grandmother    • Cancer Paternal Grandfather    • Heart disease Paternal Grandfather    • Diabetes Paternal Grandfather        No Known Allergies     .ECOG score: 0               Current Outpatient Medications   Medication Sig Dispense Refill   • aspirin 81 MG chewable tablet Chew 81 mg Daily.     • atenolol (TENORMIN) 50 MG tablet Take 50 mg by mouth Daily.     • cetirizine (zyrTEC) 10 MG tablet TAKE 1 TABLET BY MOUTH ONCE DAILY AS NEEDED     • levETIRAcetam (KEPPRA) 500 MG tablet Take 1 tablet by mouth Every 12 (Twelve) Hours. 180 tablet 4   • losartan (COZAAR) 50 MG tablet Take 50 mg by mouth Daily.     • lovastatin (MEVACOR) 20 MG tablet Take 20 mg by mouth 2  "(two) times a day.     • ondansetron ODT (ZOFRAN-ODT) 4 MG disintegrating tablet Place 1 tablet on the tongue Every 6 (Six) Hours As Needed for Nausea or Vomiting for up to 12 doses. 12 tablet 0   • prochlorperazine (COMPAZINE) 10 MG tablet Take 1 tablet by mouth Every 8 (Eight) Hours As Needed for Nausea or Vomiting. 90 tablet 5   • dexamethasone (DECADRON) 4 MG tablet Take 1 tablet by mouth 2 (Two) Times a Day With Meals. 60 tablet 5     No current facility-administered medications for this visit.     REVIEW OF SYSTEMS  CONSTITUTIONAL:  No fever, chills or night sweats.  EYES:  No blurry vision, diplopia or other vision changes. Improved photosensitivity, still no longer wearing sunglasses indoors (as he was doing at the time of his initial consultation).  ENT:  No hearing loss, nosebleeds or sore throat.  CARDIOVASCULAR:  No palpitations, arrhythmia, syncopal episodes or edema.  PULMONARY:  No hemoptysis, wheezing, chronic cough or shortness of breath.  GASTROINTESTINAL:  No constipation or diarrhea. No abdominal pain. Mild, nausea for the first couple of days after starting each cycle of qmonthly, PO Temodar, as per the HPI above.  GENITOURINARY:  No hematuria, kidney stones or frequent urination.  MUSCULOSKELETAL:  No joint or back pains.  INTEGUMENTARY: No rashes or pruritus.  ENDOCRINE:  No excessive thirst or hot flashes.  HEMATOLOGIC:  No history of free bleeding, spontaneous bleeding or clotting.  IMMUNOLOGIC:  No allergies or frequent infections.  NEUROLOGIC: As per the HPI above.  PSYCHIATRIC:  No anxiety or depression.    PHYSICAL EXAMINATION  /86   Pulse 79   Temp 98 °F (36.7 °C) (Temporal)   Resp 18   Ht 175.3 cm (69\")   Wt 102 kg (225 lb 6.4 oz)   SpO2 97%   BMI 33.29 kg/m²     Pain Score:  Pain Score    22 1346   PainSc: 0-No pain     PHQ-Score Total:  PHQ-9 Total Score:      ECO  GENERAL:  A well-developed, well-nourished, white male in no acute distress.  HEENT:  Pupils " equally round reactive to light. Extraocular muscles intact.  CARDIOVASCULAR:  Regular rate and rhythm. No murmurs, gallops or rubs.  LUNGS:  Clear to auscultation bilaterally.  ABDOMEN:  Soft, nontender, nondistended with positive bowel sounds.  EXTREMITIES:  No clubbing, cyanosis or edema bilaterally.  SKIN:  No rashes or petechiae.  NEURO:  He has good speech comprehension but has intermittent difficulties finding words. 5/5 in the left upper and lower extremities. ~4/5 in the right upper and lower extremities.  PSYCH:  Alert.    LABORATORY  Lab Results   Component Value Date    WBC 6.80 09/06/2022    HGB 15.1 09/06/2022    HCT 45.0 09/06/2022    MCV 94.7 09/06/2022     09/06/2022    NEUTROABS 4.42 09/06/2022       Lab Results   Component Value Date     09/06/2022    K 5.1 09/06/2022     09/06/2022    CO2 28.4 09/06/2022    BUN 5 (L) 09/06/2022    CREATININE 0.87 09/06/2022    GLUCOSE 127 (H) 09/06/2022    CALCIUM 9.3 09/06/2022    AST 17 09/06/2022    ALT 19 09/06/2022    ALKPHOS 94 09/06/2022    BILITOT 0.5 09/06/2022    PROTEINTOT 6.9 09/06/2022    ALBUMIN 4.18 09/06/2022     CBC (09/06/2022): WBCs: 6.80; HgB: 15.1; Hct: 45.0; platelets: 223  CBC (07/05/2022): WBCs: 6.96; HgB: 15.0; Hct: 43.6; platelets: 212  CBC (06/02/2022): WBCs: 6.60; HgB: 15.1; Hct: 44.1; platelets: 202  CBC (01/05/2022): WBCs: 7.33; HgB: 14.7; Hct: 42.4; platelets: 208  CBC (10/06/2021): WBCs: 6.76; HgB: 14.4; Hct: 43.2; platelets: 213  CBC (07/07/2021): WBCs: 6.48; HgB: 14.3; Hct: 42.6; platelets: 204  CBC (05/05/2021): WBCs: 7.1; HgB: 15.5; Hct: 46.4; platelets: 208  CBC (03/30/2021): WBCs: 7.4; HgB: 14.9; Hct: 44.1; platelets: 240  CBC (03/03/2021): WBCs: 7.4; HgB: 15.1; Hct: 45.5; platelets: 244  CBC (02/03/2021): WBCs: 7.0; HgB: 15.3; Hct: 45.3; platelets: 244  CBC (01/04/2021): WBCs: 7.4; HgB: 15.0; Hct: 45.5; platelets: 182  CBC (11/25/2020): WBCs: 7.8; HgB: 15.0; Hct: 45.0; platelets: 258    IMAGING  MRI brain  with contrast (08/27/2020):  Impression: Large cystic mass identified in the left temporoparietal region with surgical markers in place for surgical planning. The largest axial dimension of the nodular component is 2.5 x 1.6 cm.    MRI brain with and without contrast (08/31/2020):  Impression: Postsurgical changes left temporal craniotomy and resection with expected early postoperative changes including dural enhancement mildly prominent along the left inferolateral margin of the resection site with attention on followup otherwise, no residual soft tissue nodular enhancement or mass-occupying focus of enhancement. No midline shift or hydrocephalus.    MRI brain with and without contrast (10/01/2020):  Impression: T2 signal abnormality surrounding a left temporal mass measuring approximately 1.8 x 2.9 cm that shows predominantly peripheral enhancement. Some postsurgical changes noted overlying this region, but no prior examination is available for comparison.    MRI brain without contrast (12/03/2020):  Impression: The left temporal lobe lesion is slightly smaller now with several cystic areas in the lesion. There is less edema than on the previous exam in 10/2020.    MRI brain with and without contrast (02/04/2021):  Impression: [Remains stable per neurosurgery interpretation.]    MRI brain with and without contrast (04/01/2021):  Impression:  1) Based on retrospective remeasurement, no interval change in size of enhancing mass left temporal lobe with cystic components and no change in the degree of peritumoral signal abnormality/vasogenic edema. Today's measurements are 3.0 x 2.4 cm.  2) Localized mass effect but no hydrocephalus or midline shift.  3) No new enhancing brain lesions identified.    MRI brain with and without contrast (04/29/2021):  Impression: Again there is a peripherally enhancing mass in the right temporal lobe again measuring about 2.1 x 2.6 cm. Again there is surrounding vasogenic edema.    MRI  brain with and without contrast (06/25/2021):  Impression:  1) Contrast-enhancing mass in the left temporal lobe very similar to the previous exam. Surrounding vasogenic edema again noted.  2) No new contrast-enhancing lesions.    MRI brain with and without contrast (09/16/2021, compared to 06/25/2021):  Impression:  1) Given differences in technique, there is no interval change in the predominantly left temporal region enhancing process subjacent to the craniotomy site with no change in the degree of vasogenic edema or peritumoral signal abnormality. Lesion is approximately 2.5 x 2.1 cm and was previously 2.6 x 2.2 cm.  2) There remains no evidence of midline shift or hydrocephalus.  3) No new areas of pathologic enhancement identified. No acute intracranial findings are noted.    MRI brain with and without contrast (12/02/2021, compared to 09/16/2021):  Impression: Improvement in the brain. The enhancing glioma in the temporal lobe is slightly smaller in size with less surrounding edema in the brain parenchyma.    MRI brain with and without contrast (06/02/2022, compared to 12/02/2021):  Impression: Lesion persists in the left temporal lobe. It shows less surrounding edema and enhancement than on the most recent exam from December. No new lesions have developed.    MRI brain with and without contrast (09/20/2022, compared to 06/02/2022):  Impression:  1) Overall appearance significantly worse.  2) Primary area of abnormal enhancement in the left temporal lobe is larger than on the previous exam.  3) New areas of contrast enhancement in the left parietal lobe.  4) Significant surrounding edema causing a mild degree of left to right midline shift.    PATHOLOGY  Temporal lobe mass (08/28/2020):  Glioblastoma multiforme. IDH1/IDH2 mutation not detected. 1p/19q co-deletion not detected. MGMT gene promotor methylation: Detected.    IMPRESSION AND PLAN  Mr. Reardon is a 57 y.o., white male with:  Glioblastoma multiforme:  Initially diagnosed in late August 2020 following a couple of seizure events a few days apart and status post craniotomy with resection of a left, temporoparietal region, cystic mass on 08/27/2020. The final surgical pathology results are summarized above, and he recovered well from this procedure. I have had multiple, long discussions with the patient (+/- his wife; and, today, with his mother as well) since the time of his initial consultation in our clinic (on 09/30/2020) regarding this diagnosis and its prognosis. They remain aware that his diagnosis is, unfortunately, ultimately very poor; however, this malignancy was, and remains, potentially treatable and sustained remissions are possible (and he had been doing overall extremely well for nearly two years). Adjuvant, concomitant chemotherapy and radiation was recommended. He began a ~six-week course of this regimen (with temozolomide 75 mg/m2 daily with XRT; patient dose: 160 mg) by mid-October 2020 and completed it by early December 2020, overall tolerating this regimen well, with some mild, manageable nausea as his only noticeable side effect. He had a repeat MRI with [facility] neurosurgery on 12/03/2020, following the conclusion of his chemo/XRT, which showed overall improvement (summarized above). Subsequently, he was felt to be a good candidate to transition to adjuvant, qmonthly PO Temodar alone (150 mg/m2 on days 1-5 of 28-day cycles; patient dose: 320 mg). He began this qmonthly therapy in mid-January 2021, and he completed a total of nineteen (19) cycles between then and late Summer 2022. He tolerated this regimen overall very well throughout that time (with some mild, manageable nausea for the first couple of days as his only noticeable side effect; he has still not had to take any antiemetics). Meanwhile, periodic, repeat MRIs of the brain, including the one performed on 06/02/2022 (summarized above) remained stable/improved. Unfortunately,  this is now no longer the case. For the past two to three weeks, he has developed recurrent aphasia and gait imbalances (his right side is noticeably weaker than the left now); and the most recent repeat MRI of the brain (performed on 09/20/2022 and summarized above) now shows evidence of significant disease reprogression. He was reevaluated by neurosurgery earlier today (9/22), who confirmed that the risks of any additional surgery would drastically outweigh the minimal, potential benefit. I therefore had a long discussion with the patient, his wife and his mother in clinic today regarding next-line treatment recommendations with [id number] bevacizumab. Following a long discussion regarding the potential risks vs. benefits, he was agreeable to proceeding. We will plan to give the first cycle as soon as possible, hopefully no later than the first part of next week. In the meantime, a new Rx for dexamethasone 4 mg PO BID was provided today, which, as there was notable edema on the recent repeat MRI of the brain, should improve his current neurologic symptoms. We will see him back in clinic in ~three weeks, on the day of the second cycle of Avastin, with a CBC, CMP and UA. The patient, his wife and his mother were in agreement with these plans.    It is a pleasure to participate in Mr. Reardon's care. Please do not hesitate to call with any questions or concerns that you may have.    A total of 30 minutes were spent coordinating this patient’s care in clinic today; more than 50% of this time was face-to-face with the patient, his wife and his mother, reviewing his interim medical history, discussing the results of this week's repeat MRI of the brain and counseling on the current treatment and followup plan. All questions were answered to their satisfaction.    FOLLOW UP  Rx for dexamethasone 4 mg PO BID provided today. Discontinue qmonthly Temodar. Begin, next-line, palliative treatment with [id number] bevacizumab by the  first of part of next week. Return to our clinic in ~2-3 weeks, on the day of the 2nd cycle, with a CBC, CMP and UA.            This document was electronically signed by KEON Casey MD September 22, 2022 15:47 EDT      CC: MD Zena Zavaleta MD James W. Killian, MD

## 2022-09-28 NOTE — TELEPHONE ENCOUNTER
Caller: Delia Reardon    Relationship to patient: Emergency Contact    Best call back number: [phone]    Chief complaint: CALLING TO INQUIRE UPDATE ON PATIENT TREATMENT SCHEDULE

## 2022-10-03 NOTE — PROGRESS NOTES
"Patient is 56 Y/O white male newly diagnosed with Glioma.      Patient scheduled today for chemotherapy preparation and needs assessment.      Patient scheduled to begin treatment today with [id number] Avastin.    SS contacted patient via telephone [phone] and spoke with spouse Delia. SS discussed the role of  and services available.    Patient lives at home with spouse and two children: 17 Y/O Jaime and 20 Y/O Sandra.      Patient doesn't utilize any home health.    Patient doesn't utilize any durable medical equipment.    Patient's spouse and family provide transportation to appointment.    Spouse states that patient was diagnosed with cancer 2 years ago.  Patient receives social security disability benefits.    Patient completed [facility] Distress Screening and scored himself a 1 out of 10.    Distress Screening Follow-up    Diagnosis: Glioma    Location of Distress Screening: Medical Oncology    Distress Level: 1 (10/3/2022 12:15 PM)    Physical Concerns:    Fatigue: No  Pain: No  Sleep: No  Substance abuse: No    Practical Problems:    : No  Housing: No  Transportation: No  Treatment decisions: No    Emotional Concerns:       Family Concerns:    Ability to have children: No    Spiritual Concerns:      Interventions:  SS spoke with spouse, due to patient being unavailable, and provided spouse with supportive care services.    Advance Care Planning:  The patient and I discussed care planning \"Conversations that Matter.\" This service was offered, free of charge, for development of advance directives with a certified ACP facilitator. The patient does not have an up-to-date advance directive. The patient is not interested in an appointment with one of our facilitators to create or update their advanced directives.     Patient lives in [facility] at [address] [place] [address] traveling approximately 35 miles round trip.    SS completed application for [employer] program.    SS " provided patient's spouse with social workers contact information to call with any questions.    SS will follow as needed.

## 2022-10-03 NOTE — PROGRESS NOTES
CHEMOTHERAPY PREPARATION    Kennedy Reardon  [id number]  1965    Chief Complaint: chemo education     History of present illness:  Kennedy Reardon is a 57 y.o. year old male who is here today for chemotherapy preparation and needs assessment. The patient has been diagnosed with Glioma and is scheduled to begin treatment with [id number] Avastin today. At present, he is doing well and denies any complaints.     Oncology History:    Oncology/Hematology History    No history exists.       Past Medical History:   Diagnosis Date   • Brain cancer (HCC)    • History of radiation therapy 12/02/2020    left temporal lobe of brain   • Hyperlipidemia    • Hypertension    • Medical history reviewed with no changes    • Seizures (HCC)        Past Surgical History:   Procedure Laterality Date   • CRANIOTOMY FOR TUMOR Left 8/28/2020    Procedure: CRANIOTOMY FOR TUMOR LEFT;  Surgeon: Gilbert Harrell MD;  Location: [facility];  Service: Neurosurgery;  Laterality: Left;       Family History   Problem Relation Age of Onset   • Osteoarthritis Mother    • Rheum arthritis Mother    • Hypertension Mother    • Cancer Father    • Osteoporosis Sister    • Osteoarthritis Maternal Grandfather    • Rheum arthritis Maternal Grandfather    • Heart disease Paternal Grandmother    • Cancer Paternal Grandfather    • Heart disease Paternal Grandfather    • Diabetes Paternal Grandfather        Medications: The current medication list was reviewed and reconciled.     Allergies:  has No Known Allergies.    Review of Systems:  A comprehensive 14 point review of systems was performed. Significant findings as mentioned above. All other systems reviewed and are negative.     Physical Exam:    Vitals:    10/03/22 1106   BP: 140/83   Pulse: 69   Resp: 18   Temp: 98.4 °F (36.9 °C)   SpO2: 96%     Vitals:    10/03/22 1106   PainSc: 0-No pain       ECOG: (1) Restricted in Physically Strenuous Activity, Ambulatory & Able to Do Work of Light Nature  General: Well  "developed, well nourished white male. In no acute distress.   HEENT: Pupils equally round and reactive to light. Extraocular muscles intact.   Cardiovascular: Regular rate and rhythm. No murmurs, rubs or gallops.  Lungs: Clear to auscultation bilaterally.  Abdomen: Soft, nontender, nondistended. Normoactive bowel sounds x 4 quadrants.   Extremities: No clubbing, cyanosis or edema bilaterally.   Skin: Warm, dry, intact.   Neuro: Difficulty with word finding. 5/5 left upper/lower extremities. 4/5 right upper/lower extremities.   Psych: Alert and oriented x 3.             NEEDS ASSESSMENTS    Genetics  The patient's new diagnosis and family history have been reviewed for genetic counseling needs. A genetic referral is not recommended.     Barriers to care  A barriers form was also completed by the patient today. We discussed services offered by our facility to help him have adequate access to care. The patient was given the name and card for our Oncology Social Worker, Shaista Li. Based upon barriers assessment today, the patient will not require a follow-up call from the  to further discuss needs.     Advanced Care Planning  The patient and I discussed advanced care planning, \"Conversations that Matter\".   This service was offered, free of charge, for development of advance directives with a certified ACP facilitator.  The patient does not have an up-to-date advanced directive. This document is not on file with our office. The patient is not interested in an appointment with one of our facilitators to create or update their advanced directives.      Palliative Care  The patient and I discussed palliative care services. Palliative care is not the same as Hospice care. This is specialized medical care for people living with serious illness with the goal of improving quality of life for the patient and their family. [facility] has partnered with [facility] to offer our patients outpatient " palliative care early along with their treatment to assist in coordination of care, symptom management, pain management, and medical decision making.  Oncology criteria for palliative care referral is not met at this time. The patient is not interested in a palliative care consultation.     Additional Referral needs  none      CHEMOTHERAPY EDUCATION    Booklets Given: Chemotherapy and You [x]  Eating Hints [x]    Sexuality/Fertility Books []      Chemotherapy/Biotherapy Education Sheets: (list all that apply)  nausea management, acid reflux management, diarrhea management, Cancer resourse contacts information, skin and mouth care and vaccination information                                                                                                                                                                 Chemotherapy Regimen:   Treatment Plans     Name Type Plan Dates Plan Provider         Active    [facility] Bevacizumab 10mg/kg ONCOLOGY TREATMENT  9/26/2022 - Present T Reg Casey MD                    TOPICS EDUCATION PROVIDED COMMENTS   ANEMIA:  role of RBC, cause, s/s, ways to manage, role of transfusion [x]    THROMBOCYTOPENIA:  role of platelet, cause, s/s, ways to prevent bleeding, things to avoid, when to seek help [x]    NEUTROPENIA:  role of WBC, cause, infection precautions, s/s of infection, when to call MD [x]    NUTRITION & APPETITE CHANGES:  importance of maintaining healthy diet & weight, ways to manage to improve intake, dietary consult, exercise regimen [x]    DIARRHEA:  causes, s/s of dehydration, ways to manage, dietary changes, when to call MD [x]    CONSTIPATION:  causes, ways to manage, dietary changes, when to call MD [x]    NAUSEA & VOMITING:  cause, use of antiemetics, dietary changes, when to call MD [x]    MOUTH SORES:  causes, oral care, ways to manage [x]    ALOPECIA:  cause, ways to manage, resources [x]    INFERTILITY & SEXUALITY:  causes, fertility preservation options,  sexuality changes, ways to manage, importance of birth control [x]    NERVOUS SYSTEM CHANGES:  causes, s/s, neuropathies, cognitive changes, ways to manage [x]    PAIN:  causes, ways to manage [x]    SKIN & NAIL CHANGES:  cause, s/s, ways to manage [x]    ORGAN TOXICITIES:  cause, s/s, need for diagnostic tests, labs, when to notify MD [x]    SURVIVORSHIP:  distress, distress assessment, secondary malignancies, early/late effects, follow-up, social issues, social support [x]    HOME CARE:  use of spill kits, storing of PO chemo, how to manage bodily fluids [x]    MISCELLANEOUS:  drug interactions, administration, vesicant, et [x]        Assessment and Plan:    Diagnoses and all orders for this visit:    1. Glioma (HCC) (Primary)    Other orders  -     prochlorperazine (COMPAZINE) 10 MG tablet; Take 1 tablet by mouth Every 6 (Six) Hours As Needed for Nausea or Vomiting.  Dispense: 60 tablet; Refill: 1      The patient and I have reviewed their new cancer diagnosis and scheduled treatment plan. Needs assessment was completed including genetics, barriers to care, advanced care planning, and palliative care services. Referrals have been ordered as appropriate based upon our evaluation and patient desires.     Chemotherapy teaching was also completed today as documented above. Adequate time was given to answer all questions to his satisfaction. Patient and family are aware of their care team members and contact information if they have questions or problems throughout the treatment course. Needs assessments and education has been completed. The patient is adequately prepared to begin treatment as scheduled.     Reviewed with patient education regarding Compazine and Zofran prescriptions sent to pharmacy.       I advised the patient that he can take Tylenol or Ibuprofen as needed for aches/pains related to cancer/treatment. I also advised patient he could use Senakot or Miralax as needed for constipation or Imodium as  needed for diarrhea.       I reviewed with the patient the care team members. I also reviewed the option of the acute care visits provided through our oncology office for evaluation and management of symptoms related to treatment. Patient was provided with phone number to call during regular office hours [phone] press #1 and for treatment related questions call [phone] to speak to a nurse. If after hours or on the weekend call [phone] and ask to page the MD on call for [facility] Oncology services.       I spent 60 minutes with Kennedy Reardon today.  In the office today, more than 50% of this time was spent face-to-face with him  in counseling / coordination of care, reviewing his interim medical history and counseling on the current treatment plan.  All questions were answered to his satisfaction.           Electronically Signed by: ORTIZ Lopez , October 3, 2022 12:30 EDT

## 2022-10-03 NOTE — TELEPHONE ENCOUNTER
Caller: Delia Reardon    Relationship: Emergency Contact (WIFE)    Best call back number: [phone]    What is the best time to reach you: ANYTIME TODAY     Who are you requesting to speak with (clinical staff, provider,  specific staff member): DR GROSSMAN OR HIS NURSE         What was the call regarding: WANTING TO KNOW IF  NEEDING HIS LABS TOMORROW SINCE IS SCHEDULED 10/04 9AM OR IF CAN GET THEM DONE TODAY WHILE HE IS THERE FOR APPOINTMENT TODAY ?     Do you require a callback: YES

## 2022-10-17 NOTE — PROGRESS NOTES
"  Name:  Kennedy Reardon  :  1965  Date:  10/17/2022     REFERRING PHYSICIAN  Gilbert Harrell MD    PRIMARY CARE PHYSICIAN  Feliberto Moore MD    REASON FOR FOLLOWUP  1. Glioma (HCC)         CHIEF COMPLAINT  Follow up of Glioma and toxicity check    Dear Dr. Charles,    HISTORY OF PRESENT ILLNESS:   I saw Mr. Reardon in follow up today in our medical oncology clinic. As you are aware, he is a pleasant, 57 y.o., white male with a history of minimal medical problems who was in his usual state of health until late 2020 when he suffered a couple of seizures a few days apart. He presented to our ED following the second episode and was med-flighted to [facility] due to concerns that he was suffering a stroke. Ultimately, an MRI of the brain identified a cystic mass in the left temporoparietal region; and he was taken to the OR on 2020. The final, surgical pathology was consistent with glioblastoma multiforme. He recovered uneventfully from his craniotomy, and he was subsequently referred to our clinic for assistance with his management closer to his home in [place], [place].     INTERIM HISTORY:  Mr. Reardon presents today for follow up accompanied by his brother. Since his last visit, he was started on next-line, palliative treatment with [id number] bevacizumab and completed his first cycle on 10/3/22. Overall, he tolerated this very well with no noticeable side effects. He was also started on decadron 4 mg BID. He has noticed significant improvement his right sided weakness. He says he is able to grasp objects with his right hand and has had improvement in balance and speech. He reports eating and hydrating well. On arrival to clinic, his BP was elevated, 170/93. He says he gets \"worked up\" when he comes to our office. He has been monitoring his BP at home and says his SBP has been in ~130s. Repeat BP was improved, 133/80. He has no other complaints today.     Past Medical History: "   Diagnosis Date   • Brain cancer (HCC)    • History of radiation therapy 12/02/2020    left temporal lobe of brain   • Hyperlipidemia    • Hypertension    • Medical history reviewed with no changes    • Seizures (HCC)        Past Surgical History:   Procedure Laterality Date   • CRANIOTOMY FOR TUMOR Left 8/28/2020    Procedure: CRANIOTOMY FOR TUMOR LEFT;  Surgeon: Gilbert Harrell MD;  Location: [facility];  Service: Neurosurgery;  Laterality: Left;       Social History     Socioeconomic History   • Marital status:    Tobacco Use   • Smoking status: Every Day     Packs/day: 0.50     Types: Cigarettes   • Smokeless tobacco: Never   Vaping Use   • Vaping Use: Never used   Substance and Sexual Activity   • Alcohol use: No     Comment: few beers every now and then   • Drug use: Defer   • Sexual activity: Defer       Family History   Problem Relation Age of Onset   • Osteoarthritis Mother    • Rheum arthritis Mother    • Hypertension Mother    • Cancer Father    • Osteoporosis Sister    • Osteoarthritis Maternal Grandfather    • Rheum arthritis Maternal Grandfather    • Heart disease Paternal Grandmother    • Cancer Paternal Grandfather    • Heart disease Paternal Grandfather    • Diabetes Paternal Grandfather        No Known Allergies     Current Outpatient Medications   Medication Sig Dispense Refill   • aspirin 81 MG chewable tablet Chew 81 mg Daily.     • atenolol (TENORMIN) 50 MG tablet Take 50 mg by mouth Daily.     • cetirizine (zyrTEC) 10 MG tablet TAKE 1 TABLET BY MOUTH ONCE DAILY AS NEEDED     • dexamethasone (DECADRON) 4 MG tablet Take 1 tablet by mouth 2 (Two) Times a Day With Meals. 60 tablet 5   • levETIRAcetam (KEPPRA) 500 MG tablet Take 1 tablet by mouth Every 12 (Twelve) Hours. 180 tablet 4   • losartan (COZAAR) 50 MG tablet Take 50 mg by mouth Daily.     • lovastatin (MEVACOR) 20 MG tablet Take 20 mg by mouth 2 (two) times a day.     • ondansetron (ZOFRAN) 8 MG tablet Take 1 tablet by mouth 3  (Three) Times a Day As Needed for Nausea or Vomiting. 30 tablet 5   • ondansetron ODT (ZOFRAN-ODT) 4 MG disintegrating tablet Place 1 tablet on the tongue Every 6 (Six) Hours As Needed for Nausea or Vomiting for up to 12 doses. 12 tablet 0   • prochlorperazine (COMPAZINE) 10 MG tablet Take 1 tablet by mouth Every 6 (Six) Hours As Needed for Nausea or Vomiting. 60 tablet 1     No current facility-administered medications for this visit.     REVIEW OF SYSTEMS  CONSTITUTIONAL:  No fever, chills or night sweats.  EYES:  No blurry vision, diplopia or other vision changes. Improved photosensitivity, still no longer wearing sunglasses indoors (as he was doing at the time of his initial consultation).  ENT:  No hearing loss, nosebleeds or sore throat.  CARDIOVASCULAR:  No palpitations, arrhythmia, syncopal episodes or edema.  PULMONARY:  No hemoptysis, wheezing, chronic cough or shortness of breath.  GASTROINTESTINAL:  No constipation or diarrhea. No abdominal pain. No nausea or vomiting  GENITOURINARY:  No hematuria, kidney stones or frequent urination.  MUSCULOSKELETAL:  No joint or back pains.  INTEGUMENTARY: No rashes or pruritus.  ENDOCRINE:  No excessive thirst or hot flashes.  HEMATOLOGIC:  No history of free bleeding, spontaneous bleeding or clotting.  IMMUNOLOGIC:  No allergies or frequent infections.  NEUROLOGIC: As per the HPI above.  PSYCHIATRIC:  No anxiety or depression.    PHYSICAL EXAMINATION  /93   Pulse 108   Temp 98.6 °F (37 °C) (Temporal)   Resp 18   Wt 104 kg (229 lb)   SpO2 95%   BMI 33.82 kg/m²     Pain Score:  Pain Score    10/17/22 0948   PainSc: 0-No pain     BP recheck: 133/80  ECO  GENERAL:  A well-developed, well-nourished, white male in no acute distress.  HEENT:  Pupils equally round reactive to light. Extraocular muscles intact.OP clear, mmm  CARDIOVASCULAR:  Regular tachycardia. No murmurs, gallops or rubs.  LUNGS:  Clear to auscultation bilaterally  ABDOMEN:  Soft,  nontender, nondistended with positive bowel sounds.  EXTREMITIES:  No clubbing, cyanosis or edema bilaterally.  SKIN:  No rashes or petechiae.  NEURO:  Aphasia improved. 5/5 in the left upper and lower extremities. ~5/5 in the right upper and lower extremities (now improved).    LABORATORY  Lab Results   Component Value Date    WBC 11.50 (H) 10/17/2022    HGB 15.3 10/17/2022    HCT 44.7 10/17/2022    MCV 92.0 10/17/2022     10/17/2022    NEUTROABS 10.31 (H) 10/17/2022       Lab Results   Component Value Date     (L) 10/17/2022    K 4.3 10/17/2022    CL 96 (L) 10/17/2022    CO2 26.2 10/17/2022    BUN 14 10/17/2022    CREATININE 0.80 10/17/2022    GLUCOSE 211 (H) 10/17/2022    CALCIUM 8.4 (L) 10/17/2022    AST 25 10/17/2022    ALT 72 (H) 10/17/2022    ALKPHOS 67 10/17/2022    BILITOT 0.2 10/17/2022    PROTEINTOT 6.2 10/17/2022    ALBUMIN 4.06 10/17/2022     CBC (09/06/2022): WBCs: 6.80; HgB: 15.1; Hct: 45.0; platelets: 223  CBC (07/05/2022): WBCs: 6.96; HgB: 15.0; Hct: 43.6; platelets: 212  CBC (06/02/2022): WBCs: 6.60; HgB: 15.1; Hct: 44.1; platelets: 202  CBC (01/05/2022): WBCs: 7.33; HgB: 14.7; Hct: 42.4; platelets: 208  CBC (10/06/2021): WBCs: 6.76; HgB: 14.4; Hct: 43.2; platelets: 213  CBC (07/07/2021): WBCs: 6.48; HgB: 14.3; Hct: 42.6; platelets: 204  CBC (05/05/2021): WBCs: 7.1; HgB: 15.5; Hct: 46.4; platelets: 208  CBC (03/30/2021): WBCs: 7.4; HgB: 14.9; Hct: 44.1; platelets: 240  CBC (03/03/2021): WBCs: 7.4; HgB: 15.1; Hct: 45.5; platelets: 244  CBC (02/03/2021): WBCs: 7.0; HgB: 15.3; Hct: 45.3; platelets: 244  CBC (01/04/2021): WBCs: 7.4; HgB: 15.0; Hct: 45.5; platelets: 182  CBC (11/25/2020): WBCs: 7.8; HgB: 15.0; Hct: 45.0; platelets: 258    IMAGING  MRI brain with contrast (08/27/2020):  Impression: Large cystic mass identified in the left temporoparietal region with surgical markers in place for surgical planning. The largest axial dimension of the nodular component is 2.5 x 1.6 cm.    MRI  brain with and without contrast (08/31/2020):  Impression: Postsurgical changes left temporal craniotomy and resection with expected early postoperative changes including dural enhancement mildly prominent along the left inferolateral margin of the resection site with attention on followup otherwise, no residual soft tissue nodular enhancement or mass-occupying focus of enhancement. No midline shift or hydrocephalus.    MRI brain with and without contrast (10/01/2020):  Impression: T2 signal abnormality surrounding a left temporal mass measuring approximately 1.8 x 2.9 cm that shows predominantly peripheral enhancement. Some postsurgical changes noted overlying this region, but no prior examination is available for comparison.    MRI brain without contrast (12/03/2020):  Impression: The left temporal lobe lesion is slightly smaller now with several cystic areas in the lesion. There is less edema than on the previous exam in 10/2020.    MRI brain with and without contrast (02/04/2021):  Impression: [Remains stable per neurosurgery interpretation.]    MRI brain with and without contrast (04/01/2021):  Impression:  1) Based on retrospective remeasurement, no interval change in size of enhancing mass left temporal lobe with cystic components and no change in the degree of peritumoral signal abnormality/vasogenic edema. Today's measurements are 3.0 x 2.4 cm.  2) Localized mass effect but no hydrocephalus or midline shift.  3) No new enhancing brain lesions identified.    MRI brain with and without contrast (04/29/2021):  Impression: Again there is a peripherally enhancing mass in the right temporal lobe again measuring about 2.1 x 2.6 cm. Again there is surrounding vasogenic edema.    MRI brain with and without contrast (06/25/2021):  Impression:  1) Contrast-enhancing mass in the left temporal lobe very similar to the previous exam. Surrounding vasogenic edema again noted.  2) No new contrast-enhancing lesions.    MRI  brain with and without contrast (09/16/2021, compared to 06/25/2021):  Impression:  1) Given differences in technique, there is no interval change in the predominantly left temporal region enhancing process subjacent to the craniotomy site with no change in the degree of vasogenic edema or peritumoral signal abnormality. Lesion is approximately 2.5 x 2.1 cm and was previously 2.6 x 2.2 cm.  2) There remains no evidence of midline shift or hydrocephalus.  3) No new areas of pathologic enhancement identified. No acute intracranial findings are noted.    MRI brain with and without contrast (12/02/2021, compared to 09/16/2021):  Impression: Improvement in the brain. The enhancing glioma in the temporal lobe is slightly smaller in size with less surrounding edema in the brain parenchyma.    MRI brain with and without contrast (06/02/2022, compared to 12/02/2021):  Impression: Lesion persists in the left temporal lobe. It shows less surrounding edema and enhancement than on the most recent exam from December. No new lesions have developed.    MRI brain with and without contrast (09/20/2022, compared to 06/02/2022):  Impression:  1) Overall appearance significantly worse.  2) Primary area of abnormal enhancement in the left temporal lobe is larger than on the previous exam.  3) New areas of contrast enhancement in the left parietal lobe.  4) Significant surrounding edema causing a mild degree of left to right midline shift.    PATHOLOGY  Temporal lobe mass (08/28/2020):  Glioblastoma multiforme. IDH1/IDH2 mutation not detected. 1p/19q co-deletion not detected. MGMT gene promotor methylation: Detected.    IMPRESSION AND PLAN  Mr. Reardon is a 57 y.o., white male with:  Glioblastoma multiforme: Initially diagnosed in late August 2020 following a couple of seizure events a few days apart and status post craniotomy with resection of a left, temporoparietal region, cystic mass on 08/27/2020. The final surgical pathology results  are summarized above, and he recovered well from this procedure. Dr. Casey has had multiple, long discussions with the patient (+/- his wife; and, today, with his mother as well) since the time of his initial consultation in our clinic (on 09/30/2020) regarding this diagnosis and its prognosis. They remain aware that his diagnosis is, unfortunately, ultimately very poor; however, this malignancy was, and remains, potentially treatable and sustained remissions are possible (and he had been doing overall extremely well for nearly two years). Adjuvant, concomitant chemotherapy and radiation was recommended. He began a ~six-week course of this regimen (with temozolomide 75 mg/m2 daily with XRT; patient dose: 160 mg) by mid-October 2020 and completed it by early December 2020, overall tolerating this regimen well, with some mild, manageable nausea as his only noticeable side effect. He had a repeat MRI with [facility] neurosurgery on 12/03/2020, following the conclusion of his chemo/XRT, which showed overall improvement (summarized above). Subsequently, he was felt to be a good candidate to transition to adjuvant, qmonthly PO Temodar alone (150 mg/m2 on days 1-5 of 28-day cycles; patient dose: 320 mg). He began this qmonthly therapy in mid-January 2021, and he completed a total of nineteen (19) cycles between then and late Summer 2022. He tolerated this regimen overall very well throughout that time (with some mild, manageable nausea for the first couple of days as his only noticeable side effect; he has still not had to take any antiemetics). Meanwhile, periodic, repeat MRIs of the brain, including the one performed on 06/02/2022 (summarized above) remained stable/improved. Unfortunately, this is now no longer the case. For the past few weeks, he has developed recurrent aphasia and gait imbalances (his right side is noticeably weaker than the left now); and the most recent repeat MRI of the brain (performed on  09/20/2022 and summarized above) shows evidence of significant disease reprogression. He was reevaluated by neurosurgery (9/22), who confirmed that the risks of any additional surgery would drastically outweigh the minimal, potential benefit. Dr. Casey therefore had a long discussion with the patient, his wife and his mother in clinic regarding next-line treatment recommendations with [id number] bevacizumab. Following a long discussion regarding the potential risks vs. benefits, he was agreeable to proceeding. He was started on treatment and completed his first cycle on 10/3/22 which he tolerated well with no noticeable side effects. With treatment, in addition to dexamethasone 4 mg PO BID he has had improvement in neurologic symptoms as above. Exam/labs from today without cause for concern.  We will proceed with cycle 2 today . We will see him back in clinic in ~two weeks, on the day of the third cycle of Avastin, with a CBC, CMP and UA.     ACO / JEM/Other  Quality measures  -  Kennedy Reardon did not receive 2022 flu vaccine. This was recommended.   -  Kennedy Reardon reports a pain score of 0.   -  Current outpatient and discharge medications have been reconciled for the patient.  Reviewed by: ORTIZ Love      The patient, his wife and his mother were in agreement with these plans.    It is a pleasure to participate in Mr. Reardon's care. Please do not hesitate to call with any questions or concerns that you may have.    A total of 30 minutes were spent coordinating this patient’s care in clinic today; more than 50% of this time was face-to-face with the patient and his brother, reviewing his interim medical history and counseling on the current treatment and followup plan. All questions were answered to their satisfaction.    FOLLOW UP  Continue dexamethasone 4 mg PO BID. Continue next-line, palliative treatment with [id number] bevacizumab and proceed with cycle 2 today. Return to our clinic in ~2 weeks,  on the day of the 3rd cycle, with a CBC, CMP and UA.      This document was electronically signed by Lea Mcwilliams, ORTIZ October 17, 2022 08:46 EDT      CC: MD Zena Zavaleta MD James W. Killian, MD

## 2022-11-08 NOTE — PROGRESS NOTES
Name:  Kennedy Reardon  :  1965  Date:  2022     REFERRING PHYSICIAN  Gilbert Harrell MD    PRIMARY CARE PHYSICIAN  Feliberto Moore MD    REASON FOR FOLLOWUP  1. 5 X 3.8cm L temporal glioma s/p crani and excision 2020       CHIEF COMPLAINT  Improved right-sided weakness since starting palliative Avastin.    Dear Dr. Moore,    HISTORY OF PRESENT ILLNESS:   I saw Mr. Reardon in follow up today in our medical oncology clinic. As you are aware, he is a pleasant, 57 y.o., white male with a history of minimal medical problems who was in his usual state of health until late 2020 when he suffered a couple of seizures a few days apart. He presented to our ED following the second episode and was med-flighted to [facility] due to concerns that he was suffering a stroke. Ultimately, an MRI of the brain identified a cystic mass in the left temporoparietal region; and he was taken to the OR on 2020. The final, surgical pathology was consistent with glioblastoma multiforme. He recovered uneventfully from his craniotomy, and he was subsequently referred to our clinic for assistance with his management closer to his home in [place], [place].  He did overall very well over the course of the next ~two (2) years with adjuvant Temodar/XRT followed by qmonthly Temodar alone. Unfortunately, by 2022, he developed recurrent neurological symptoms (aphasia and right-sided weakness), and a repeat MRI of the brain confirmed reprogressive disease. His palliative treatment was switched to next-line, [id number] Avastin at that time.    INTERIM HISTORY:  Mr. Reardon returns to clinic today for follow up accompanied by his wife. He was started on next-line, palliative treatment with [id number] bevacizumab on 10/03/2022; and he has received a total of three (3) cycles today. He continues to tolerate this therapy very well with no noticeable side effects. He also remains on Decadron 4 mg BID. Since  starting the Avastin, he has noticed a significant improvement in his right-sided weakness. He is now able to grasp objects with his right hand, and he has had clear improvement in both his balance and speech. His only new complaint today is of bilateral lower leg swelling, which improves overnight but gets worse during the day, especially while he has been on steroids.    Past Medical History:   Diagnosis Date   • Brain cancer (HCC)    • History of radiation therapy 12/02/2020    left temporal lobe of brain   • Hyperlipidemia    • Hypertension    • Medical history reviewed with no changes    • Seizures (HCC)        Past Surgical History:   Procedure Laterality Date   • CRANIOTOMY FOR TUMOR Left 8/28/2020    Procedure: CRANIOTOMY FOR TUMOR LEFT;  Surgeon: Gilbert Harrell MD;  Location: [facility];  Service: Neurosurgery;  Laterality: Left;       Social History     Socioeconomic History   • Marital status:    Tobacco Use   • Smoking status: Every Day     Packs/day: 0.50     Types: Cigarettes   • Smokeless tobacco: Never   Vaping Use   • Vaping Use: Never used   Substance and Sexual Activity   • Alcohol use: No     Comment: few beers every now and then   • Drug use: Defer   • Sexual activity: Defer       Family History   Problem Relation Age of Onset   • Osteoarthritis Mother    • Rheum arthritis Mother    • Hypertension Mother    • Cancer Father    • Osteoporosis Sister    • Osteoarthritis Maternal Grandfather    • Rheum arthritis Maternal Grandfather    • Heart disease Paternal Grandmother    • Cancer Paternal Grandfather    • Heart disease Paternal Grandfather    • Diabetes Paternal Grandfather        No Known Allergies     Current Outpatient Medications   Medication Sig Dispense Refill   • aspirin 81 MG chewable tablet Chew 81 mg Daily.     • atenolol (TENORMIN) 50 MG tablet Take 50 mg by mouth Daily.     • cetirizine (zyrTEC) 10 MG tablet TAKE 1 TABLET BY MOUTH ONCE DAILY AS NEEDED     • dexamethasone  (DECADRON) 4 MG tablet Take 1 tablet by mouth 2 (Two) Times a Day With Meals. 60 tablet 5   • levETIRAcetam (KEPPRA) 500 MG tablet Take 1 tablet by mouth Every 12 (Twelve) Hours. 180 tablet 4   • losartan (COZAAR) 50 MG tablet Take 50 mg by mouth Daily.     • lovastatin (MEVACOR) 20 MG tablet Take 20 mg by mouth 2 (two) times a day.     • ondansetron (ZOFRAN) 8 MG tablet Take 1 tablet by mouth 3 (Three) Times a Day As Needed for Nausea or Vomiting. 30 tablet 5   • ondansetron ODT (ZOFRAN-ODT) 4 MG disintegrating tablet Place 1 tablet on the tongue Every 6 (Six) Hours As Needed for Nausea or Vomiting for up to 12 doses. 12 tablet 0   • prochlorperazine (COMPAZINE) 10 MG tablet Take 1 tablet by mouth Every 6 (Six) Hours As Needed for Nausea or Vomiting. 60 tablet 1     No current facility-administered medications for this visit.     REVIEW OF SYSTEMS  CONSTITUTIONAL:  No fever, chills or night sweats.  EYES:  No blurry vision, diplopia or other vision changes.  ENT:  No hearing loss, nosebleeds or sore throat.  CARDIOVASCULAR:  No palpitations, arrhythmia, syncopal episodes or edema.  PULMONARY:  No hemoptysis, wheezing, chronic cough or shortness of breath.  GASTROINTESTINAL:  No constipation or diarrhea. No abdominal pain. No nausea or vomiting  GENITOURINARY:  No hematuria, kidney stones or frequent urination.  MUSCULOSKELETAL:  No joint or back pains.  INTEGUMENTARY: No rashes or pruritus.  ENDOCRINE:  No excessive thirst or hot flashes.  HEMATOLOGIC:  No history of free bleeding, spontaneous bleeding or clotting.  IMMUNOLOGIC:  No allergies or frequent infections.  NEUROLOGIC: As per the HPI above.  PSYCHIATRIC:  No anxiety or depression.    PHYSICAL EXAMINATION  /93   Pulse 73   Temp 97.7 °F (36.5 °C) (Temporal)   Resp 18   Wt 107 kg (235 lb)   SpO2 96%   BMI 34.70 kg/m²     Pain Score:  Pain Score    22 1033   PainSc: 0-No pain     BP recheck: 133/80  ECO  GENERAL:  A well-developed,  well-nourished, white male in no acute distress.  HEENT:  Pupils equally round reactive to light. Extraocular muscles intact.  CARDIOVASCULAR:  Regular tachycardia. No murmurs, gallops or rubs.  LUNGS:  Clear to auscultation bilaterally  ABDOMEN:  Soft, nontender, nondistended with positive bowel sounds.  EXTREMITIES:  No clubbing, cyanosis or edema bilaterally.  SKIN:  No rashes or petechiae.  NEURO: Much improved aphasia compared to ~one month ago. 5/5 in the left upper and lower extremities. ~4+/5 in the right upper and lower extremities (significantly improved compared to ~one month ago).  PSYCH: Alert and oriented x 3.    LABORATORY  Lab Results   Component Value Date    WBC 12.40 (H) 11/08/2022    HGB 14.8 11/08/2022    HCT 42.2 11/08/2022    MCV 91.1 11/08/2022     11/08/2022    NEUTROABS 8.54 (H) 10/31/2022       Lab Results   Component Value Date     (L) 11/08/2022    K 5.1 11/08/2022    CL 94 (L) 11/08/2022    CO2 28.1 11/08/2022    BUN 16 11/08/2022    CREATININE 0.71 (L) 11/08/2022    GLUCOSE 131 (H) 11/08/2022    CALCIUM 8.8 11/08/2022    AST 28 11/08/2022    ALT 83 (H) 11/08/2022    ALKPHOS 60 11/08/2022    BILITOT 0.4 11/08/2022    PROTEINTOT 6.5 11/08/2022    ALBUMIN 3.94 11/08/2022     CBC (11/08/2022): WBCs: 12.4; HgB: 14.8; Hct: 42.2; platelets: 153  CBC (09/06/2022): WBCs: 6.80; HgB: 15.1; Hct: 45.0; platelets: 223  CBC (07/05/2022): WBCs: 6.96; HgB: 15.0; Hct: 43.6; platelets: 212  CBC (06/02/2022): WBCs: 6.60; HgB: 15.1; Hct: 44.1; platelets: 202  CBC (01/05/2022): WBCs: 7.33; HgB: 14.7; Hct: 42.4; platelets: 208  CBC (10/06/2021): WBCs: 6.76; HgB: 14.4; Hct: 43.2; platelets: 213  CBC (07/07/2021): WBCs: 6.48; HgB: 14.3; Hct: 42.6; platelets: 204  CBC (05/05/2021): WBCs: 7.1; HgB: 15.5; Hct: 46.4; platelets: 208  CBC (03/30/2021): WBCs: 7.4; HgB: 14.9; Hct: 44.1; platelets: 240  CBC (03/03/2021): WBCs: 7.4; HgB: 15.1; Hct: 45.5; platelets: 244  CBC (02/03/2021): WBCs: 7.0; HgB:  15.3; Hct: 45.3; platelets: 244  CBC (01/04/2021): WBCs: 7.4; HgB: 15.0; Hct: 45.5; platelets: 182  CBC (11/25/2020): WBCs: 7.8; HgB: 15.0; Hct: 45.0; platelets: 258    IMAGING  MRI brain with contrast (08/27/2020):  Impression: Large cystic mass identified in the left temporoparietal region with surgical markers in place for surgical planning. The largest axial dimension of the nodular component is 2.5 x 1.6 cm.    MRI brain with and without contrast (08/31/2020):  Impression: Postsurgical changes left temporal craniotomy and resection with expected early postoperative changes including dural enhancement mildly prominent along the left inferolateral margin of the resection site with attention on followup otherwise, no residual soft tissue nodular enhancement or mass-occupying focus of enhancement. No midline shift or hydrocephalus.    MRI brain with and without contrast (10/01/2020):  Impression: T2 signal abnormality surrounding a left temporal mass measuring approximately 1.8 x 2.9 cm that shows predominantly peripheral enhancement. Some postsurgical changes noted overlying this region, but no prior examination is available for comparison.    MRI brain without contrast (12/03/2020):  Impression: The left temporal lobe lesion is slightly smaller now with several cystic areas in the lesion. There is less edema than on the previous exam in 10/2020.    MRI brain with and without contrast (02/04/2021):  Impression: [Remains stable per neurosurgery interpretation.]    MRI brain with and without contrast (04/01/2021):  Impression:  1) Based on retrospective remeasurement, no interval change in size of enhancing mass left temporal lobe with cystic components and no change in the degree of peritumoral signal abnormality/vasogenic edema. Today's measurements are 3.0 x 2.4 cm.  2) Localized mass effect but no hydrocephalus or midline shift.  3) No new enhancing brain lesions identified.    MRI brain with and without  contrast (04/29/2021):  Impression: Again there is a peripherally enhancing mass in the right temporal lobe again measuring about 2.1 x 2.6 cm. Again there is surrounding vasogenic edema.    MRI brain with and without contrast (06/25/2021):  Impression:  1) Contrast-enhancing mass in the left temporal lobe very similar to the previous exam. Surrounding vasogenic edema again noted.  2) No new contrast-enhancing lesions.    MRI brain with and without contrast (09/16/2021, compared to 06/25/2021):  Impression:  1) Given differences in technique, there is no interval change in the predominantly left temporal region enhancing process subjacent to the craniotomy site with no change in the degree of vasogenic edema or peritumoral signal abnormality. Lesion is approximately 2.5 x 2.1 cm and was previously 2.6 x 2.2 cm.  2) There remains no evidence of midline shift or hydrocephalus.  3) No new areas of pathologic enhancement identified. No acute intracranial findings are noted.    MRI brain with and without contrast (12/02/2021, compared to 09/16/2021):  Impression: Improvement in the brain. The enhancing glioma in the temporal lobe is slightly smaller in size with less surrounding edema in the brain parenchyma.    MRI brain with and without contrast (06/02/2022, compared to 12/02/2021):  Impression: Lesion persists in the left temporal lobe. It shows less surrounding edema and enhancement than on the most recent exam from December 2021. No new lesions have developed.    MRI brain with and without contrast (09/20/2022, compared to 06/02/2022):  Impression:  1) Overall appearance significantly worse.  2) Primary area of abnormal enhancement in the left temporal lobe is larger than on the previous exam.  3) New areas of contrast enhancement in the left parietal lobe.  4) Significant surrounding edema causing a mild degree of left to right midline shift.    PATHOLOGY  Temporal lobe mass (08/28/2020):  Glioblastoma multiforme.  IDH1/IDH2 mutation not detected. 1p/19q co-deletion not detected. MGMT gene promotor methylation: Detected.    IMPRESSION AND PLAN  Mr. Reardon is a 57 y.o., white male with:  Glioblastoma multiforme: Initially diagnosed in late August 2020 following a couple of seizure events a few days apart and status post craniotomy with resection of a left, temporoparietal region, cystic mass on 08/27/2020. The final surgical pathology results are summarized above, and he recovered well from this procedure. I have had multiple, long discussions with the patient (+/- his wife and/or mother) since the time of his initial consultation in our clinic (on 09/30/2020) regarding this diagnosis and its prognosis. They remain aware that his diagnosis is, unfortunately, ultimately very poor; however, this malignancy was, and remains, potentially treatable and sustained remissions are possible (and he had been doing overall extremely well for nearly two years). Adjuvant, concomitant chemotherapy and radiation was recommended. He began a ~six-week course of this regimen (with temozolomide 75 mg/m2 daily with XRT; patient dose: 160 mg) by mid-October 2020 and completed it by early December 2020, overall tolerating this regimen well, with some mild, manageable nausea as his only noticeable side effect. He had a repeat MRI with [facility] neurosurgery on 12/03/2020, following the conclusion of his chemo/XRT, which showed overall improvement (summarized above). Subsequently, he was felt to be a good candidate to transition to adjuvant, qmonthly PO Temodar alone (150 mg/m2 on days 1-5 of 28-day cycles; patient dose: 320 mg). He began this qmonthly therapy in mid-January 2021, and he completed a total of nineteen (19) cycles between then and late Summer 2022. He tolerated this regimen overall very well throughout that time (with some mild, manageable nausea for the first couple of days as his only noticeable side effect; he has still not  had to take any antiemetics). Meanwhile, periodic, repeat MRIs of the brain, including the one performed on 06/02/2022 (summarized above) remained stable/improved throughout that time. Unfortunately, this is now no longer the case. By September 2022, he developed recurrent aphasia, gait imbalances and right-sided weakness; and the most recent repeat MRI of the brain (performed on 09/20/2022 and summarized above) showed evidence of significant disease reprogression. He was reevaluated by neurosurgery (on 09/22/2022), who confirmed that the risks of any additional surgery would drastically outweigh the minimal, potential benefit. I therefore had a long discussion with the patient, his wife and his mother at that time (late September 2022) regarding next-line treatment recommendations with [id number] bevacizumab. Following a long discussion regarding the potential risks vs. benefits, he was agreeable to proceeding. He began this new therapy on 10/03/2022 and has now received a total of three (3), [id number] cycles to date. With this treatment, he has had significant improvement in his neurologic symptoms over the course of the past month, with resolved aphasia and much improved right-sided weakness/gait imbalances. We will decreased his dexamethasone to 2 mg PO BID and otherwise proceed with this current treatment plan (fourth cycle of bevacizumab next week). We will see him back in our clinic in ~three weeks, on the day of the fifth cycle of bevacizumab, with a CBC, CMP, UA and repeat MRI of the brain with and without contrast. The patient and his wife were in agreement with these plans.    It is a pleasure to participate in Mr. Reardon's care. Please do not hesitate to call with any questions or concerns that you may have.    A total of 30 minutes were spent coordinating this patient’s care in clinic today; more than 50% of this time was face-to-face with the patient and his wife, reviewing his interim medical history and  counseling on the current treatment and followup plan. All questions were answered to their satisfaction.    FOLLOW UP  Continue dexamethasone, though now at the reduced dose of 2 mg PO BID. Continue next-line, palliative treatment with [id number] bevacizumab and proceed with cycle #4 on 11/14, as planned. Return to our clinic in ~3 weeks, on the day of the 5th cycle of bevacizumab (11/29), with a CBC, CMP, UA and repeat MRI of the brain with and without contrast.            This document was electronically signed by KEON Casey MD November 8, 2022 11:44 EST      CC: MD Zena Reid MD

## 2022-11-10 NOTE — PROGRESS NOTES
SS received call from patient's daughter Sandra Reardon [phone] requesting for order him a cane for home.  Daughter also requesting assistance with medication for patient's anxiety. SS informed daughter that SS would send in-basket message to providers nurse to follow up. Daughter lives in the home, but isn't on the HIPPA form. SS explained to daughter that patient can add her to HIPPA form when he comes for treatment on Monday, November 14.  SS will follow.

## 2022-11-15 NOTE — PROGRESS NOTES
SS received call from patient's daughter Sandra [phone] stating that patient did receive walker, but due to weakness isn't able to use at this time.  Daughter requesting wheelchair and bedside commode to be arranged and delivered to patient's home.    SS made daughter aware that SS will send providers nurse an in basket requesting these items to be ordered.    SS will follow as needed.

## 2022-11-15 NOTE — PROGRESS NOTES
Name:  Kennedy Reardon  :  1965  Date:  11/15/2022     REFERRING PHYSICIAN  Gilbert Harrell MD    PRIMARY CARE PHYSICIAN  Feliberto Moore MD    REASON FOR FOLLOWUP  1. 5 X 3.8cm L temporal glioma s/p crani and excision 2020       CHIEF COMPLAINT  Improved right-sided weakness since starting palliative Avastin, but recently reworsened (after his Decadron dose was decreased from 4 mg BID to 2 mg BID last week).    Dear Dr. Moore,    HISTORY OF PRESENT ILLNESS:   I saw Mr. Reardon in follow up today in our medical oncology clinic. As you are aware, he is a pleasant, 57 y.o., white male with a history of minimal medical problems who was in his usual state of health until late 2020 when he suffered a couple of seizures a few days apart. He presented to our ED following the second episode and was med-flighted to [facility] due to concerns that he was suffering a stroke. Ultimately, an MRI of the brain identified a cystic mass in the left temporoparietal region; and he was taken to the OR on 2020. The final, surgical pathology was consistent with glioblastoma multiforme. He recovered uneventfully from his craniotomy, and he was subsequently referred to our clinic for assistance with his management closer to his home in [place], [place].  He did overall very well over the course of the next ~two (2) years with adjuvant Temodar/XRT followed by qmonthly Temodar alone. Unfortunately, by 2022, he developed recurrent neurological symptoms (aphasia and right-sided weakness), and a repeat MRI of the brain confirmed reprogressive disease. His palliative treatment was switched to next-line, [id number] Avastin at that time.    INTERIM HISTORY:  Mr. Reardon returns to clinic today for follow up for a previously unscheduled appointment accompanied by his wife and mother. He was started on next-line, palliative treatment with [id number] bevacizumab on 10/03/2022; and he has received a  total of four (4) cycles to date (the fourth and most recent just yesterday, 11/14). He continues to tolerate this therapy very well with no noticeable side effects. Since starting the Avastin, he initially attained a significant improvement in his right-sided weakness. Over the course of the next several weeks, he steadily became able to grasp objects with his right hand; and he had clear improvement in both his balance and speech. Unfortunately, shortly after his dose of Decadron was decreased from 4 mg BID to 2 mg BID (last week), these symptoms significantly reworsened again (and he consequently had to be brought to today's previously unscheduled appointment in a wheelchair again). He has no new complaints.    Past Medical History:   Diagnosis Date   • Brain cancer (HCC)    • History of radiation therapy 12/02/2020    left temporal lobe of brain   • Hyperlipidemia    • Hypertension    • Medical history reviewed with no changes    • Seizures (HCC)        Past Surgical History:   Procedure Laterality Date   • CRANIOTOMY FOR TUMOR Left 8/28/2020    Procedure: CRANIOTOMY FOR TUMOR LEFT;  Surgeon: Gilbert Harrell MD;  Location: [facility];  Service: Neurosurgery;  Laterality: Left;       Social History     Socioeconomic History   • Marital status:    Tobacco Use   • Smoking status: Every Day     Packs/day: 0.50     Types: Cigarettes   • Smokeless tobacco: Never   Vaping Use   • Vaping Use: Never used   Substance and Sexual Activity   • Alcohol use: No     Comment: few beers every now and then   • Drug use: Defer   • Sexual activity: Defer       Family History   Problem Relation Age of Onset   • Osteoarthritis Mother    • Rheum arthritis Mother    • Hypertension Mother    • Cancer Father    • Osteoporosis Sister    • Osteoarthritis Maternal Grandfather    • Rheum arthritis Maternal Grandfather    • Heart disease Paternal Grandmother    • Cancer Paternal Grandfather    • Heart disease Paternal Grandfather    •  Diabetes Paternal Grandfather        No Known Allergies     Current Outpatient Medications   Medication Sig Dispense Refill   • aspirin 81 MG chewable tablet Chew 81 mg Daily.     • atenolol (TENORMIN) 50 MG tablet Take 50 mg by mouth Daily.     • cetirizine (zyrTEC) 10 MG tablet TAKE 1 TABLET BY MOUTH ONCE DAILY AS NEEDED     • dexamethasone (DECADRON) 4 MG tablet Take 1 tablet by mouth 2 (Two) Times a Day With Meals. 60 tablet 5   • levETIRAcetam (KEPPRA) 500 MG tablet Take 1 tablet by mouth Every 12 (Twelve) Hours. 180 tablet 4   • LORazepam (ATIVAN) 1 MG tablet Take 1 tablet by mouth Every 8 (Eight) Hours As Needed for Anxiety. 90 tablet 0   • losartan (COZAAR) 50 MG tablet Take 50 mg by mouth Daily.     • lovastatin (MEVACOR) 20 MG tablet Take 20 mg by mouth 2 (two) times a day.     • ondansetron (ZOFRAN) 8 MG tablet Take 1 tablet by mouth 3 (Three) Times a Day As Needed for Nausea or Vomiting. 30 tablet 5   • ondansetron ODT (ZOFRAN-ODT) 4 MG disintegrating tablet Place 1 tablet on the tongue Every 6 (Six) Hours As Needed for Nausea or Vomiting for up to 12 doses. 12 tablet 0   • prochlorperazine (COMPAZINE) 10 MG tablet Take 1 tablet by mouth Every 6 (Six) Hours As Needed for Nausea or Vomiting. 60 tablet 1     No current facility-administered medications for this visit.     REVIEW OF SYSTEMS  CONSTITUTIONAL:  No fever, chills or night sweats.  EYES:  No blurry vision, diplopia or other vision changes.  ENT:  No hearing loss, nosebleeds or sore throat.  CARDIOVASCULAR:  No palpitations, arrhythmia, syncopal episodes or edema.  PULMONARY:  No hemoptysis, wheezing, chronic cough or shortness of breath.  GASTROINTESTINAL:  No constipation or diarrhea. No abdominal pain. No nausea or vomiting  GENITOURINARY:  No hematuria, kidney stones or frequent urination.  MUSCULOSKELETAL:  No joint or back pains.  INTEGUMENTARY: No rashes or pruritus.  ENDOCRINE:  No excessive thirst or hot flashes.  HEMATOLOGIC:  No history  "of free bleeding, spontaneous bleeding or clotting.  IMMUNOLOGIC:  No allergies or frequent infections.  NEUROLOGIC: As per the HPI above.  PSYCHIATRIC:  No anxiety or depression.    PHYSICAL EXAMINATION  /85   Pulse 105   Temp 97.8 °F (36.6 °C)   Resp 18   Ht 175.3 cm (69\")   Wt 109 kg (241 lb)   SpO2 98%   BMI 35.59 kg/m²     Pain Score:  Pain Score    11/15/22 1458   PainSc: 0-No pain     BP recheck: 133/80  ECO  GENERAL:  A well-developed, well-nourished, white male in no acute distress, sitting in a wheelchair.  HEENT:  Pupils equally round reactive to light. Extraocular muscles intact.  CARDIOVASCULAR:  Regular tachycardia. No murmurs, gallops or rubs.  LUNGS:  Clear to auscultation bilaterally  ABDOMEN:  Soft, nontender, nondistended with positive bowel sounds.  EXTREMITIES:  No clubbing, cyanosis or edema bilaterally.  SKIN:  No rashes or petechiae.  NEURO: Reworsened aphasia and right-sided weakness compared to last week.  PSYCH: Alert and oriented x 3.    LABORATORY  Lab Results   Component Value Date    WBC 9.47 2022    HGB 13.4 2022    HCT 38.4 2022    MCV 91.9 2022     (L) 2022    NEUTROABS 7.10 (H) 2022       Lab Results   Component Value Date     (L) 2022    K 4.4 2022    CL 96 (L) 2022    CO2 27.1 2022    BUN 14 2022    CREATININE 0.69 (L) 2022    GLUCOSE 143 (H) 2022    CALCIUM 8.3 (L) 2022    AST 28 2022     (H) 2022    ALKPHOS 54 2022    BILITOT 0.4 2022    PROTEINTOT 5.8 (L) 2022    ALBUMIN 3.32 (L) 2022     CBC (2022): WBCs: 9.47; HgB: 13.4; Hct: 38.4; platelets: 118  CBC (2022): WBCs: 12.4; HgB: 14.8; Hct: 42.2; platelets: 153  CBC (2022): WBCs: 6.80; HgB: 15.1; Hct: 45.0; platelets: 223  CBC (2022): WBCs: 6.96; HgB: 15.0; Hct: 43.6; platelets: 212  CBC (2022): WBCs: 6.60; HgB: 15.1; Hct: 44.1; platelets: " 202  CBC (01/05/2022): WBCs: 7.33; HgB: 14.7; Hct: 42.4; platelets: 208  CBC (10/06/2021): WBCs: 6.76; HgB: 14.4; Hct: 43.2; platelets: 213  CBC (07/07/2021): WBCs: 6.48; HgB: 14.3; Hct: 42.6; platelets: 204  CBC (05/05/2021): WBCs: 7.1; HgB: 15.5; Hct: 46.4; platelets: 208  CBC (03/30/2021): WBCs: 7.4; HgB: 14.9; Hct: 44.1; platelets: 240  CBC (03/03/2021): WBCs: 7.4; HgB: 15.1; Hct: 45.5; platelets: 244  CBC (02/03/2021): WBCs: 7.0; HgB: 15.3; Hct: 45.3; platelets: 244  CBC (01/04/2021): WBCs: 7.4; HgB: 15.0; Hct: 45.5; platelets: 182  CBC (11/25/2020): WBCs: 7.8; HgB: 15.0; Hct: 45.0; platelets: 258    IMAGING  MRI brain with contrast (08/27/2020):  Impression: Large cystic mass identified in the left temporoparietal region with surgical markers in place for surgical planning. The largest axial dimension of the nodular component is 2.5 x 1.6 cm.    MRI brain with and without contrast (08/31/2020):  Impression: Postsurgical changes left temporal craniotomy and resection with expected early postoperative changes including dural enhancement mildly prominent along the left inferolateral margin of the resection site with attention on followup otherwise, no residual soft tissue nodular enhancement or mass-occupying focus of enhancement. No midline shift or hydrocephalus.    MRI brain with and without contrast (10/01/2020):  Impression: T2 signal abnormality surrounding a left temporal mass measuring approximately 1.8 x 2.9 cm that shows predominantly peripheral enhancement. Some postsurgical changes noted overlying this region, but no prior examination is available for comparison.    MRI brain without contrast (12/03/2020):  Impression: The left temporal lobe lesion is slightly smaller now with several cystic areas in the lesion. There is less edema than on the previous exam in 10/2020.    MRI brain with and without contrast (02/04/2021):  Impression: [Remains stable per neurosurgery interpretation.]    MRI brain with  and without contrast (04/01/2021):  Impression:  1) Based on retrospective remeasurement, no interval change in size of enhancing mass left temporal lobe with cystic components and no change in the degree of peritumoral signal abnormality/vasogenic edema. Today's measurements are 3.0 x 2.4 cm.  2) Localized mass effect but no hydrocephalus or midline shift.  3) No new enhancing brain lesions identified.    MRI brain with and without contrast (04/29/2021):  Impression: Again there is a peripherally enhancing mass in the right temporal lobe again measuring about 2.1 x 2.6 cm. Again there is surrounding vasogenic edema.    MRI brain with and without contrast (06/25/2021):  Impression:  1) Contrast-enhancing mass in the left temporal lobe very similar to the previous exam. Surrounding vasogenic edema again noted.  2) No new contrast-enhancing lesions.    MRI brain with and without contrast (09/16/2021, compared to 06/25/2021):  Impression:  1) Given differences in technique, there is no interval change in the predominantly left temporal region enhancing process subjacent to the craniotomy site with no change in the degree of vasogenic edema or peritumoral signal abnormality. Lesion is approximately 2.5 x 2.1 cm and was previously 2.6 x 2.2 cm.  2) There remains no evidence of midline shift or hydrocephalus.  3) No new areas of pathologic enhancement identified. No acute intracranial findings are noted.    MRI brain with and without contrast (12/02/2021, compared to 09/16/2021):  Impression: Improvement in the brain. The enhancing glioma in the temporal lobe is slightly smaller in size with less surrounding edema in the brain parenchyma.    MRI brain with and without contrast (06/02/2022, compared to 12/02/2021):  Impression: Lesion persists in the left temporal lobe. It shows less surrounding edema and enhancement than on the most recent exam from December 2021. No new lesions have developed.    MRI brain with and  without contrast (09/20/2022, compared to 06/02/2022):  Impression:  1) Overall appearance significantly worse.  2) Primary area of abnormal enhancement in the left temporal lobe is larger than on the previous exam.  3) New areas of contrast enhancement in the left parietal lobe.  4) Significant surrounding edema causing a mild degree of left to right midline shift.    MRI brain with and without contrast (11/08/2022, compared to 09/20/2022):  Impression:  1) Significant interval improvement in the extent of enhancement, size and degree of mass effect involving a predominantly temporal parietal region multifocal or multilobulated mass. Specifically, the degree of peritumoral signal abnormality has decreased and there is significant decrease in mass effect.  2) A satellite nodule in the left superior parietal lobe appears of increased size compared to previous exam and there is also new area of enhancement now in the left frontoparietal region that was not on the previous exam; but there is decrease in vasogenic edema in this location.  3) Otherwise stable exam with other nonacute findings.    PATHOLOGY  Temporal lobe mass (08/28/2020):  Glioblastoma multiforme. IDH1/IDH2 mutation not detected. 1p/19q co-deletion not detected. MGMT gene promotor methylation: Detected.    IMPRESSION AND PLAN  Mr. Reardon is a 57 y.o., white male with:  Glioblastoma multiforme: Initially diagnosed in late August 2020 following a couple of seizure events a few days apart and status post craniotomy with resection of a left, temporoparietal region, cystic mass on 08/27/2020. The final surgical pathology results are summarized above, and he recovered well from this procedure. I have had multiple, long discussions with the patient (+/- his wife and/or mother) since the time of his initial consultation in our clinic (on 09/30/2020) regarding this diagnosis and its prognosis. They remain aware that his diagnosis is, unfortunately, ultimately  very poor; however, this malignancy was, and remains, potentially treatable and sustained remissions are possible (and he had been doing overall extremely well for nearly two years). Adjuvant, concomitant chemotherapy and radiation was recommended. He began a ~six-week course of this regimen (with temozolomide 75 mg/m2 daily with XRT; patient dose: 160 mg) by mid-October 2020 and completed it by early December 2020, overall tolerating this regimen well, with some mild, manageable nausea as his only noticeable side effect. He had a repeat MRI with [facility] neurosurgery on 12/03/2020, following the conclusion of his chemo/XRT, which showed overall improvement (summarized above). Subsequently, he was felt to be a good candidate to transition to adjuvant, qmonthly PO Temodar alone (150 mg/m2 on days 1-5 of 28-day cycles; patient dose: 320 mg). He began this qmonthly therapy in mid-January 2021, and he completed a total of nineteen (19) cycles between then and late Summer 2022. He tolerated this regimen overall very well throughout that time (with some mild, manageable nausea for the first couple of days as his only noticeable side effect; he has still not had to take any antiemetics). Meanwhile, periodic, repeat MRIs of the brain, including the one performed on 06/02/2022 (summarized above) remained stable/improved throughout that time. Unfortunately, this is now no longer the case. By September 2022, he developed recurrent aphasia, gait imbalances and right-sided weakness; and the repeat MRI of the brain (performed on 09/20/2022 and summarized above) showed evidence of significant disease reprogression. He was reevaluated by neurosurgery (on 09/22/2022), who confirmed that the risks of any additional surgery would drastically outweigh the minimal, potential benefit. I therefore had a long discussion with the patient, his wife and his mother at that time (late September 2022) regarding next-line treatment  recommendations with [id number] bevacizumab. Following a long discussion regarding the potential risks vs. benefits, he was agreeable to proceeding. He began this new therapy on 10/03/2022 and has now received a total of four (4), [id number] cycles to date (the fourth and most recent cycle was just given yesterday, 11/14). With this treatment, he initially had significant, symptomatic improvement in his neurologic complaints over the course of the previous month, with resolved aphasia and much improved right-sided weakness/gait imbalances. Unfortunately, these symptoms have been reworsening again this past week. He has been taking Decadron 2 mg (rather than 4 mg) BID since last week, as instructed. Meanwhile, in better news, a repeat MRI of the brain performed earlier today (11/15/2022) and summarized above does confirm significant, gross improvement in the bulk of the tumor, with much improved mass effect on the left ventricle and other nearby structures; however, while also improved, there continues to be a significant amount of vasogenic edema. His recent, reworsening neurologic symptoms of right-sided weakness and aphasia are therefore likely secondary to our starting to wean him off of the Decadron too soon. He and his family were instructed to reincrease his dose to 4 mg BID until further notice. We will otherwise proceed with this current treatment plan (fifth cycle of [id number] bevacizumab scheduled for 11/29/2022). We will see him back in clinic in six weeks, on the day of the seventh cycle of bevacizumab, with a CBC, CMP and repeat MRI of the brain. The patient, his wife and his mother were in agreement with these plans.    It is a pleasure to participate in Mr. Reardon's care. Please do not hesitate to call with any questions or concerns that you may have.    A total of 30 minutes were spent coordinating this patient’s care in clinic today; more than 50% of this time was face-to-face with the patient and his  wife, reviewing his interim medical history, discussing the results of today's repeat MRI of the brain and counseling on the current treatment and followup plan. All questions were answered to their satisfaction.    FOLLOW UP  Continue dexamethasone, though now a reincreased dose of 4 mg PO BID. Continue next-line, palliative treatment with [id number] bevacizumab, as planned (5th cycle scheduled for 11/29/2022). Return to our clinic in 6 weeks, on the day of the 7th cycle of bevacizumab, with a CBC, CMP, UA and repeat MRI of the brain with and without contrast.            This document was electronically signed by KEON Casey MD November 15, 2022 15:25 EST      CC: MD Zena Reid MD

## 2022-11-22 NOTE — TELEPHONE ENCOUNTER
Caller: Delia Reardon    Relationship: Emergency Contact    Best call back number: [phone]    Requested Prescriptions:   Requested Prescriptions     Pending Prescriptions Disp Refills   • dexamethasone (DECADRON) 4 MG tablet 60 tablet 5     Sig: Take 1 tablet by mouth 2 (Two) Times a Day With Meals.        Pharmacy where request should be sent: [facility] [address] [place] [address] - [phone] [facility] [phone] FX     Additional details provided by patient:     Does the patient have less than a 3 day supply:  [x] Yes  [] No

## 2022-11-29 PROBLEM — J96.01 ACUTE RESPIRATORY FAILURE WITH HYPOXIA: Status: ACTIVE | Noted: 2022-01-01

## 2022-12-01 NOTE — CASE MANAGEMENT/SOCIAL WORK
Discharge Planning Assessment   Mel[place]     Patient Name: Kennedy Reardon  MRN: [id number]  Today's Date: 2022    Admit Date: 2022     Discharge Plan     Row Name 22 1428       Plan    Final Note SS notified [facility] per Patience.    Row Name 22 1408       Plan    Final Discharge Disposition Code 20 -     Final Note Pt  22              Continued Care and Services - Discharged on 2022 Admission date: 2022 - Discharge disposition:     Destination     Service Provider Request Status Selected Services Address Phone Fax Patient Preferred    [facility] Pending - Request Sent N/A [address] [place] [id number] [phone] --              Expected Discharge Date and Time     Expected Discharge Date Expected Discharge Time    Dec 3, 2022      CHRISTINE Varghese

## 2022-12-01 NOTE — PAYOR COMM NOTE
"CONTACT:   Trupti Sorto RN  Phone: [phone]  Fax: [phone]    DISCHARGE NOTIFICATION- PENDING APPROVAL     DISCHARGE TO:     REF # [id number]  DC DATE: 22    IF YOU NEED ANYTHING FURTHER PLEASE LET ME KNOW.   THANKS     Irais Reardon (Dcsd. Male)     Date of Birth   1965    Social Security Number       Address   [address] [facility] [address]    Home Phone   [phone]    MRN   [id number]       [facility]    Marital Status                               Admission Date   22    Admission Type   Emergency    Admitting Provider   Liam Martinez DO    Attending Provider       Department, Room/Bed   [address], 3349/1S       Discharge Date   2022    Discharge Disposition       Discharge Destination                               Attending Provider: (none)   Allergies: No Known Allergies    Isolation: Droplet   Infection: Influenza (22)   Code Status: Prior    Ht: 170.2 cm (67\")   Wt: 111 kg (245 lb 9.6 oz)    Admission Cmt: None   Principal Problem: Acute respiratory failure with hypoxia (HCC) [J96.01]                 Active Insurance as of 2022     Primary Coverage     Payor Plan Insurance Group Employer/Plan Group    [employer] [place] [employer] [place]ICAID      Payor Plan Address Payor Plan Phone Number Payor Plan Fax Number Effective Dates     [address] [address] [phone]  2018 - None Entered    [facility] [id number]       Subscriber Name Subscriber Birth Date Member ID       IRAIS REARDON 1965 [id number]                 Emergency Contacts      (Rel.) Home Phone Work Phone Mobile Phone    Delia Reardon (Spouse) [phone] -- --            Discharge Summary    No notes of this type exist for this encounter.         "

## 2022-12-02 NOTE — PAYOR COMM NOTE
"CONTACT:  Trupti Sorto RN    Utilization Management Dept.   [facility]  [address] [place] [address]    Phone:[phone]  Fax: [phone]    REQUEST FOR INPATIENT AUTHORIZATION  REF # [id number]      I got an approval on this but the dates were incorrect.   He was admitted on 22 and  on 22  Can you please correct dates for me? Thanks,     Irais Reardon (Dcsd. Male)     Date of Birth   1965    Social Security Number       Address   [address] [facility] [address]    Home Phone   [phone]    MRN   [id number]       [employer]    Marital Status                               Admission Date   22    Admission Type   Emergency    Admitting Provider   Liam Martinez DO    Attending Provider       Department, Room/Bed   [address], 3349/1S       Discharge Date   2022    Discharge Disposition       Discharge Destination                               Attending Provider: (none)   Allergies: No Known Allergies    Isolation: None   Infection: Influenza (22)   Code Status: Prior    Ht: 170.2 cm (67\")   Wt: 111 kg (245 lb 9.6 oz)    Admission Cmt: None   Principal Problem: Acute respiratory failure with hypoxia (HCC) [J96.01]                 Active Insurance as of 2022     Primary Coverage     Payor Plan Insurance Group Employer/Plan Group    [employer] [place] [employer] [place]ICAID      Payor Plan Address Payor Plan Phone Number Payor Plan Fax Number Effective Dates    [address] [address] [phone]  2018 - None Entered    [facility] [id number]       Subscriber Name Subscriber Birth Date Member ID       IRAIS REARDON 1965 [id number]                 Emergency Contacts      (Rel.) Home Phone Work Phone Mobile Phone    Delia Reardon (Spouse) [phone] -- --              "

## 2022-12-03 NOTE — DISCHARGE SUMMARY
[facility] HOSPITALIST MEDICINE DEATH SUMMARY    Patient Identification:  Name:  Kennedy Reardon  Age:  57 y.o.  Sex:  male  :  1965  MRN:  [id number]  Visit Number:  [id number]    Date of Admission: 2022  Date of Death: 2022 at 2215    PCP: Feliberto Moore MD    DISCHARGE DIAGNOSIS  1.  Acute hypoxic respiratory failure  2.  Influenza A  3.  Rapidly progressive glioblastoma multiforme  4.  Mild rhabdomyolysis  5.  Thrombocytopenia    CONSULTS   1. Cayla Uribe NP, Palliative Care    PROCEDURES PERFORMED  None    HOSPITAL COURSE  Mr. Reardon was an unfortunate 57 y.o. male who presented to [facility] Emergency Department on 2022 with a chief complaint of whole body pain and general decline.  Patient had a past medical history remarkable for glioblastoma, essential hypertension, hyperlipidemia and history of seizures.  Patient was diagnosed with glioblastoma multiform approximately 2 years ago where he underwent temporoparietal lobe resection with adjuvant chemotherapy and radiation.  Patient completed adjuvant chemotherapy and radiation in 2020, and tolerated his chemotherapy regimen well.  Patient did have repeat MRI at [facility] with neurosurgery on 12/3/2020 after completing his chemo/XRT, which did show overall improvement.  Patient was then transitioned to adjuvant monthly Temodar and started this therapy in 2021.  Patient completed 19 cycles between 2021 in late summer 2022.  Patient did have periodic MRIs demonstrating stable or improved condition.  Unfortunately, by 2022, he had developed recurrent aphasia with gait abnormalities and right-sided weakness.  Repeat MRI of brain at that time demonstrated significant disease progression.  Patient was reevaluated by neurosurgery who confirmed the risks of additional surgery would drastically outweigh any minimal potential benefits of surgery.  As such,  patient was then transitioned to bevacizumab.  Patient did receive a total of 4 cycles with significant symptomatic improvement.  Unfortunately, patient had recurrence of right-sided hemiplegia with expressive aphasia and inability to walk which represented approximately 2 weeks prior to evaluation in the emergency department.  Patient had continued deterioration where he was essentially bedbound with poor to no oral intake with waxing/waning moments of clarity and confusion.  As patient's family could no longer take care of him at home, the patient's wife did bring him to the emergency department for further treatment and evaluation.     Initial evaluation in the emergency department did consist of basic laboratory work as well as physical exam and vital signs.  Initial vital signs found patient's blood pressure 147/85, respirations 22, heart rate 128 and temperature 98.4 °F with oxygen saturation 85% on room air.  Initial lab work did include ABG, CBC and CMP.  ABG demonstrated pH of 7.34, PCO2 52, PO2 85 and bicarb 28.  CMP demonstrated sodium of 135 and essentially an unremarkable CMP.  CBC demonstrated hemoglobin 11.4 and hematocrit 33 but otherwise was within normal limits.  CT head demonstrated probably no significant interval change in variable density lesions.  However, recommendation was made for repeat MRI for more accurate comparative purposes.  As such, MRI of brain was obtained which demonstrated findings consistent with progression of disease with features indicating increased mass-effect involving enhancing masses throughout the left cerebral hemisphere.  Influenza A/B panel was obtained which was positive for influenza A.  Overall, patient was diagnosed with rapidly progressive worsening of glioblastoma multiforme with subsequent influenza A infection and resulting acute hypoxic respiratory failure.  Lengthy conversation was held with patient's family regarding goals of care.  After lengthy  conversation, patient was transitioned to comfort measures and was admitted for further evaluation by palliative care services and case management regarding placement in the near future.    Unfortunately, patient continued to have rapid deterioration within 24 hours of admission where he became unresponsive to both verbal and physical stimuli.  Patient continued to decline until he did eventually  on 2022 at 2215.    DISCHARGE DISPOSITION       TEST  RESULTS PENDING AT DISCHARGE         Liam Martinez,   22  18:56 EST

## 2022-12-20 ENCOUNTER — APPOINTMENT (OUTPATIENT)
Dept: MRI IMAGING | Facility: HOSPITAL | Age: 57
End: 2022-12-20

## 2023-01-10 ENCOUNTER — SPECIALTY PHARMACY (OUTPATIENT)
Dept: PHARMACY | Facility: HOSPITAL | Age: 58
End: 2023-01-10
Payer: COMMERCIAL

## 2023-11-17 NOTE — TELEPHONE ENCOUNTER
Janell from [facility] called to report an abnormal MRI,       MRI Brain W WO completed today,     Overall:  Significantly worse since June of this year.     June MRI link:  MRI Brain With & Without Contrast (06/02/2022 09:13)      Today's MRI link:  MRI Brain With & Without Contrast (09/20/2022 14:34)     Specimens verified per policy.

## (undated) DEVICE — GLV SURG PREMIERPRO MIC LTX PF SZ7 BRN

## (undated) DEVICE — APPL DURAPREP IODOPHOR APL 26ML

## (undated) DEVICE — ANTIBACTERIAL UNDYED BRAIDED (POLYGLACTIN 910), SYNTHETIC ABSORBABLE SUTURE: Brand: COATED VICRYL

## (undated) DEVICE — DISPOSABLE SUCTION CANISTER LINERS

## (undated) DEVICE — TOOL F2/8TA23 LEGEND 8CM 2.3MM TAPER: Brand: MIDAS REX™

## (undated) DEVICE — 3M™ MEDIPORE™ H SOFT CLOTH SURGICAL TAPE, 2863, 3 IN X 10 YD, 12/CASE: Brand: 3M™ MEDIPORE™

## (undated) DEVICE — SUT ETHLN 3/0 FS1 30IN 669H

## (undated) DEVICE — STRAIGHT TIP, UNIVERSAL

## (undated) DEVICE — DRV BATRY MATRIXPRO STRL

## (undated) DEVICE — INTENDED FOR TISSUE SEPARATION, AND OTHER PROCEDURES THAT REQUIRE A SHARP SURGICAL BLADE TO PUNCTURE OR CUT.: Brand: BARD-PARKER ® STAINLESS STEEL BLADES

## (undated) DEVICE — 3M™ STERI-DRAPE™ INSTRUMENT POUCH 1018: Brand: STERI-DRAPE™

## (undated) DEVICE — CLTH CLENS READYCLEANSE PERI CARE PK/5

## (undated) DEVICE — PERFORATOR CRANI 14MM 11MM

## (undated) DEVICE — GLV SURG PREMIERPRO MIC LTX PF SZ7.5 BRN

## (undated) DEVICE — SUT SILK 4/0 RB1CR8 18IN C054D

## (undated) DEVICE — TOWEL,OR,DSP,ST,BLUE,STD,4/PK,20PK/CS: Brand: MEDLINE

## (undated) DEVICE — DISPOSABLE TUBING SET AND EXTENDER FILTER TUBING

## (undated) DEVICE — SPNG GZ WOVN 4X4IN 12PLY 10/BX STRL

## (undated) DEVICE — SPHR MARKR STEALTH STATION

## (undated) DEVICE — ACCY PA700 LUBRICANT DIFFUSER MR7 4 PACK: Brand: MIDAS REX

## (undated) DEVICE — GLV SURG PREMIERPRO MIC LTX PF SZ6.5 BRN

## (undated) DEVICE — PK CRANI 10

## (undated) DEVICE — Device

## (undated) DEVICE — TOOL 8TA11 LEGEND 8CM 1.1MM TA: Brand: MIDAS REX ™

## (undated) DEVICE — 3M™ WARMING BLANKET, LOWER BODY, 10 PER CASE, 42568: Brand: BAIR HUGGER™